# Patient Record
Sex: FEMALE | Race: WHITE | NOT HISPANIC OR LATINO | Employment: OTHER | URBAN - METROPOLITAN AREA
[De-identification: names, ages, dates, MRNs, and addresses within clinical notes are randomized per-mention and may not be internally consistent; named-entity substitution may affect disease eponyms.]

---

## 2017-03-26 ENCOUNTER — OFFICE VISIT (OUTPATIENT)
Dept: URGENT CARE | Facility: URGENT CARE | Age: 27
End: 2017-03-26
Payer: COMMERCIAL

## 2017-03-26 VITALS
HEIGHT: 66 IN | TEMPERATURE: 98 F | BODY MASS INDEX: 21.69 KG/M2 | HEART RATE: 75 BPM | DIASTOLIC BLOOD PRESSURE: 70 MMHG | RESPIRATION RATE: 14 BRPM | WEIGHT: 135 LBS | SYSTOLIC BLOOD PRESSURE: 100 MMHG | OXYGEN SATURATION: 97 %

## 2017-03-26 DIAGNOSIS — J20.9 ACUTE BRONCHITIS WITH SYMPTOMS > 10 DAYS: Primary | ICD-10-CM

## 2017-03-26 DIAGNOSIS — R07.0 THROAT PAIN: ICD-10-CM

## 2017-03-26 LAB
DEPRECATED S PYO AG THROAT QL EIA: NORMAL
MICRO REPORT STATUS: NORMAL
SPECIMEN SOURCE: NORMAL

## 2017-03-26 PROCEDURE — 87081 CULTURE SCREEN ONLY: CPT | Performed by: FAMILY MEDICINE

## 2017-03-26 PROCEDURE — 87880 STREP A ASSAY W/OPTIC: CPT | Performed by: FAMILY MEDICINE

## 2017-03-26 PROCEDURE — 99213 OFFICE O/P EST LOW 20 MIN: CPT | Performed by: FAMILY MEDICINE

## 2017-03-26 RX ORDER — ALBUTEROL SULFATE 90 UG/1
2 AEROSOL, METERED RESPIRATORY (INHALATION) EVERY 6 HOURS PRN
Qty: 1 INHALER | Refills: 0 | Status: SHIPPED | OUTPATIENT
Start: 2017-03-26 | End: 2018-10-15

## 2017-03-26 RX ORDER — PREDNISONE 20 MG/1
20 TABLET ORAL 2 TIMES DAILY
Qty: 10 TABLET | Refills: 0 | Status: SHIPPED | OUTPATIENT
Start: 2017-03-26 | End: 2017-03-26

## 2017-03-26 RX ORDER — PREDNISONE 20 MG/1
40 TABLET ORAL DAILY
Qty: 10 TABLET | Refills: 0 | Status: SHIPPED | OUTPATIENT
Start: 2017-03-26 | End: 2018-10-15

## 2017-03-26 RX ORDER — AZITHROMYCIN 250 MG/1
TABLET, FILM COATED ORAL
Qty: 6 TABLET | Refills: 0 | Status: SHIPPED | OUTPATIENT
Start: 2017-03-26 | End: 2018-10-15

## 2017-03-26 NOTE — PATIENT INSTRUCTIONS
"Additional history/life update:       Acute bronchitis with symptoms > 10 days  Throat pain :uri then chest cold for almost two weeks now.  Hard to cough up sputum seems \"stuck in chest. \" no fevers.  No dyspnea.  Throat pain last 2-3 days.  Can swallow ok.     Medical, social, surgical, and family histories reviewed.      REVIEW OF SYSTEMS FOR GENERAL, RESPIRATORY, CV, GI AND PSYCHIATRIC NEGATIVE EXCEPT AS NOTED IN HPI.         OBJECTIVE:  /70  Pulse 75  Temp 98  F (36.7  C) (Oral)  Resp 14  Ht 5' 6\" (1.676 m)  Wt 135 lb (61.2 kg)  SpO2 97%  Breastfeeding? Yes  BMI 21.79 kg/m2    EXAM:  GENERAL APPEARANCE: healthy, alert and no distress  Tympanic membranes clear  RESP: lungs clear to auscultation - no rales, rhonchi or wheezes, some wheeze with forced expiration however.   Tonsils 1=, slightly red, no exudate.     CV: regular rates and rhythm, normal S1 S2, no S3 or S4 and no murmur, click or rub -    Rapid strep negative.     ASSESSMENT AND PLAN  1. Acute bronchitis with symptoms > 10 days  Recommend:    Albuterol 2 puffs four times daily until better.  If not improving in 1-2 days start prednisone.   -start prednisone in am as may keep awake at night.   -if not improving 2-3 days after starting prednisone or if high fevers then start azithromycin (both printed out. )         2. Throat pain    - Strep, Rapid Screen  - Beta strep group A culture        MYCHART SIGNUP FOR E-VISITS AND EASIER COMMUNICATION:  [email or web address]     Zipnosis:  [email or web address].  Sign up for e-visits for common illnesses!     RADIOLOGY:   [facility]:  [phone] to schedule any radiology tests at [facility]: [phone]    Mammograms/colonoscopies:  [phone]    CONSUMER PRICE LINE for estimates of test costs:  [phone]         Joel Wegener,MD          "

## 2017-03-26 NOTE — MR AVS SNAPSHOT
"              After Visit Summary   3/26/2017    Lindsey Giles    MRN: [id number]           Patient Information     Date Of Birth          1990        Visit Information        Provider Department      3/26/2017 4:15 PM Wegener, Joel Daniel Irwin, MD [facility]        Today's Diagnoses     Acute bronchitis with symptoms > 10 days    -  1    Throat pain          Care Instructions    Additional history/life update:       Acute bronchitis with symptoms > 10 days  Throat pain :uri then chest cold for almost two weeks now.  Hard to cough up sputum seems \"stuck in chest. \" no fevers.  No dyspnea.  Throat pain last 2-3 days.  Can swallow ok.     Medical, social, surgical, and family histories reviewed.      REVIEW OF SYSTEMS FOR GENERAL, RESPIRATORY, CV, GI AND PSYCHIATRIC NEGATIVE EXCEPT AS NOTED IN HPI.         OBJECTIVE:  /70  Pulse 75  Temp 98  F (36.7  C) (Oral)  Resp 14  Ht 5' 6\" (1.676 m)  Wt 135 lb (61.2 kg)  SpO2 97%  Breastfeeding? Yes  BMI 21.79 kg/m2    EXAM:  GENERAL APPEARANCE: healthy, alert and no distress  Tympanic membranes clear  RESP: lungs clear to auscultation - no rales, rhonchi or wheezes, some wheeze with forced expiration however.   Tonsils 1=, slightly red, no exudate.     CV: regular rates and rhythm, normal S1 S2, no S3 or S4 and no murmur, click or rub -    Rapid strep negative.     ASSESSMENT AND PLAN  1. Acute bronchitis with symptoms > 10 days  Recommend:    Albuterol 2 puffs four times daily until better.  If not improving in 1-2 days start prednisone.   -start prednisone in am as may keep awake at night.   -if not improving 2-3 days after starting prednisone or if high fevers then start azithromycin (both printed out. )         2. Throat pain    - Strep, Rapid Screen  - Beta strep group A culture        MYCHART SIGNUP FOR E-VISITS AND EASIER COMMUNICATION:  [email or web address]     Zipnosis:  [email or web address].  Sign up for e-visits " "for common illnesses!     RADIOLOGY:   [facility]:  [phone] to schedule any radiology tests at [facility]: [phone]    Mammograms/colonoscopies:  [phone]    CONSUMER PRICE LINE for estimates of test costs:  [phone]         Joel Wegener,MD                Follow-ups after your visit        Who to contact     If you have questions or need follow up information about today's clinic visit or your schedule please contact [facility] directly at [phone].  Normal or non-critical lab and imaging results will be communicated to you by [employer], letter or phone within 4 business days after the clinic has received the results. If you do not hear from us within 7 days, please contact the clinic through [employer] or phone. If you have a critical or abnormal lab result, we will notify you by phone as soon as possible.  Submit refill requests through [employer] or call your pharmacy and they will forward the refill request to us. Please allow 3 business days for your refill to be completed.          Additional Information About Your Visit        MyChart Information     [employer] lets you send messages to your doctor, view your test results, renew your prescriptions, schedule appointments and more. To sign up, go to [email or web address] . Click on \"Log in\" on the left side of the screen, which will take you to the Welcome page. Then click on \"Sign up Now\" on the right side of the page.     You will be asked to enter the access code listed below, as well as some personal information. Please follow the directions to create your username and password.     Your access code is: [email or web address]  Expires: 2017  5:23 PM     Your access code will  in 90 days. If you need help or a new code, please call your [place] clinic or [phone].        Care EveryWhere ID     This is your Care EveryWhere ID. This could be used by other organizations to access your [place] " "medical records  [id number]        Your Vitals Were     Pulse Temperature Respirations Height Pulse Oximetry Breastfeeding?    75 98  F (36.7  C) (Oral) 14 5' 6\" (1.676 m) 97% Yes    BMI (Body Mass Index)                   21.79 kg/m2            Blood Pressure from Last 3 Encounters:   03/26/17 100/70    Weight from Last 3 Encounters:   03/26/17 135 lb (61.2 kg)              We Performed the Following     Beta strep group A culture     Strep, Rapid Screen          Today's Medication Changes          These changes are accurate as of: 3/26/17  5:23 PM.  If you have any questions, ask your nurse or doctor.               Start taking these medicines.        Dose/Directions    albuterol 108 (90 BASE) MCG/ACT Inhaler   Commonly known as:  PROAIR HFA/PROVENTIL HFA/VENTOLIN HFA   Used for:  Acute bronchitis with symptoms > 10 days   Started by:  Wegener, Joel Daniel Irwin, MD        Dose:  2 puff   Inhale 2 puffs into the lungs every 6 hours as needed for shortness of breath / dyspnea or wheezing   Quantity:  1 Inhaler   Refills:  0       azithromycin 250 MG tablet   Commonly known as:  ZITHROMAX   Used for:  Acute bronchitis with symptoms > 10 days   Started by:  Wegener, Joel Daniel Irwin, MD        Two tablets first day, then one tablet daily for four days.   Quantity:  6 tablet   Refills:  0       predniSONE 20 MG tablet   Commonly known as:  DELTASONE   Used for:  Acute bronchitis with symptoms > 10 days   Started by:  Wegener, Joel Daniel Irwin, MD        Dose:  20 mg   Take 1 tablet (20 mg) by mouth 2 times daily   Quantity:  10 tablet   Refills:  0            Where to get your medicines      These medications were sent to [id number] - [place], [place] - [address] AT [facility]  [address], [place] [address]     Phone:  [phone]     albuterol 108 (90 BASE) MCG/ACT Inhaler         Some of these will need a paper prescription and others can be bought over the counter.  Ask " your nurse if you have questions.     Bring a paper prescription for each of these medications     azithromycin 250 MG tablet    predniSONE 20 MG tablet                Primary Care Provider    None Specified       No primary provider on file.        Thank you!     Thank you for choosing [facility]  for your care. Our goal is always to provide you with excellent care. Hearing back from our patients is one way we can continue to improve our services. Please take a few minutes to complete the written survey that you may receive in the mail after your visit with us. Thank you!             Your Updated Medication List - Protect others around you: Learn how to safely use, store and throw away your medicines at www.disposemymeds.org.          This list is accurate as of: 3/26/17  5:23 PM.  Always use your most recent med list.                   Brand Name Dispense Instructions for use    albuterol 108 (90 BASE) MCG/ACT Inhaler    PROAIR HFA/PROVENTIL HFA/VENTOLIN HFA    1 Inhaler    Inhale 2 puffs into the lungs every 6 hours as needed for shortness of breath / dyspnea or wheezing       azithromycin 250 MG tablet    ZITHROMAX    6 tablet    Two tablets first day, then one tablet daily for four days.       predniSONE 20 MG tablet    DELTASONE    10 tablet    Take 1 tablet (20 mg) by mouth 2 times daily

## 2017-03-26 NOTE — PROGRESS NOTES
"Additional history/life update:       Acute bronchitis with symptoms > 10 days  Throat pain :uri then chest cold for almost two weeks now.  Hard to cough up sputum seems \"stuck in chest. \" no fevers.  No dyspnea.  Throat pain last 2-3 days.  Can swallow ok.     Medical, social, surgical, and family histories reviewed.      REVIEW OF SYSTEMS FOR GENERAL, RESPIRATORY, CV, GI AND PSYCHIATRIC NEGATIVE EXCEPT AS NOTED IN HPI.         OBJECTIVE:  /70  Pulse 75  Temp 98  F (36.7  C) (Oral)  Resp 14  Ht 5' 6\" (1.676 m)  Wt 135 lb (61.2 kg)  SpO2 97%  Breastfeeding? Yes  BMI 21.79 kg/m2    EXAM:  GENERAL APPEARANCE: healthy, alert and no distress  Tympanic membranes clear  RESP: lungs clear to auscultation - no rales, rhonchi or wheezes, some wheeze with forced expiration however.   Tonsils 1=, slightly red, no exudate.     CV: regular rates and rhythm, normal S1 S2, no S3 or S4 and no murmur, click or rub -    Rapid strep negative.     ASSESSMENT AND PLAN  1. Acute bronchitis with symptoms > 10 days  Recommend:    Albuterol 2 puffs four times daily until better.  If not improving in 1-2 days start prednisone.   -start prednisone in am as may keep awake at night.   -if not improving 2-3 days after starting prednisone or if high fevers then start azithromycin (both printed out. )         2. Throat pain    - Strep, Rapid Screen  - Beta strep group A culture        MYCHART SIGNUP FOR E-VISITS AND EASIER COMMUNICATION:  [email or web address]     Zipnosis:  [email or web address].  Sign up for e-visits for common illnesses!     RADIOLOGY:   [facility]:  [phone] to schedule any radiology tests at [facility]: [phone]    Mammograms/colonoscopies:  [phone]    CONSUMER PRICE LINE for estimates of test costs:  [phone]         Joel Wegener,MD        "

## 2017-03-26 NOTE — NURSING NOTE
"Chief Complaint   Patient presents with     Urgent Care     Pharyngitis     sore throat since mid week, chills. 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        Initial /70  Pulse 75  Temp 98  F (36.7  C) (Oral)  Resp 14  Ht 5' 6\" (1.676 m)  Wt 135 lb (61.2 kg)  SpO2 97%  Breastfeeding? Yes  BMI 21.79 kg/m2 Estimated body mass index is 21.79 kg/(m^2) as calculated from the following:    Height as of this encounter: 5' 6\" (1.676 m).    Weight as of this encounter: 135 lb (61.2 kg).  Medication Reconciliation: complete  "

## 2017-03-28 LAB
BACTERIA SPEC CULT: NORMAL
MICRO REPORT STATUS: NORMAL
SPECIMEN SOURCE: NORMAL

## 2017-12-10 ENCOUNTER — OFFICE VISIT (OUTPATIENT)
Dept: URGENT CARE | Facility: URGENT CARE | Age: 27
End: 2017-12-10
Payer: COMMERCIAL

## 2017-12-10 VITALS
RESPIRATION RATE: 14 BRPM | HEIGHT: 66 IN | HEART RATE: 96 BPM | WEIGHT: 130 LBS | DIASTOLIC BLOOD PRESSURE: 68 MMHG | OXYGEN SATURATION: 97 % | TEMPERATURE: 98.3 F | SYSTOLIC BLOOD PRESSURE: 100 MMHG | BODY MASS INDEX: 20.89 KG/M2

## 2017-12-10 DIAGNOSIS — J03.90 TONSILLITIS: ICD-10-CM

## 2017-12-10 DIAGNOSIS — R07.0 THROAT PAIN: Primary | ICD-10-CM

## 2017-12-10 LAB
DEPRECATED S PYO AG THROAT QL EIA: NORMAL
SPECIMEN SOURCE: NORMAL

## 2017-12-10 PROCEDURE — 99213 OFFICE O/P EST LOW 20 MIN: CPT | Performed by: FAMILY MEDICINE

## 2017-12-10 PROCEDURE — 87880 STREP A ASSAY W/OPTIC: CPT | Performed by: FAMILY MEDICINE

## 2017-12-10 PROCEDURE — 87081 CULTURE SCREEN ONLY: CPT | Performed by: FAMILY MEDICINE

## 2017-12-10 RX ORDER — AMOXICILLIN 875 MG
875 TABLET ORAL 2 TIMES DAILY
Qty: 20 TABLET | Refills: 0 | Status: SHIPPED | OUTPATIENT
Start: 2017-12-10 | End: 2018-10-15

## 2017-12-10 NOTE — PATIENT INSTRUCTIONS
When You Have a Sore Throat    A sore throat can be painful. There are many reasons why you may have a sore throat. Your healthcare provider will work with you to find the cause of your sore throat. He or she will also find the best treatment for you.  What causes a sore throat?  Sore throats can be caused or worsened by:    Cold or flu viruses    Bacteria    Irritants such as tobacco smoke or air pollution    Acid reflux  A healthy throat  The tonsils are on the sides of the throat near the base of the tongue. They collect viruses and bacteria and help fight infection. The throat (pharynx) is the passage for air. Mucus from the nasal cavity also moves down the passage.  An inflamed throat  The tonsils and pharynx can become inflamed due to a cold or flu virus. Postnasal drip (excess mucus draining from the nasal cavity) can irritate the throat. It can also make the throat or tonsils more likely to be infected by bacteria. Severe, untreated tonsillitis in children or adults can cause a pocket of pus (abscess) to form near the tonsil.  Your evaluation  A medical evaluation can help find the cause of your sore throat. It can also help your healthcare provider choose the best treatment for you. The evaluation may include a health history, physical exam, and diagnostic tests.  Health history  Your healthcare provider may ask you the following:    How long has the sore throat lasted and how have you been treating it?    Do you have any other symptoms, such as body aches, fever, or cough?    Does your sore throat recur? If so, how often? How many days of school or work have you missed because of a sore throat?    Do you have trouble eating or swallowing?    Have you been told that you snore or have other sleep problems?    Do you have bad breath?    Do you cough up bad-tasting mucus?  Physical exam  During the exam, your healthcare provider checks your ears, nose, and throat for problems. He or she also checks for  "swelling in the neck, and may listen to your chest.  Possible tests  Other tests your healthcare provider may perform include:    A throat swab to check for bacteria such as streptococcus (the bacteria that causes strep throat)    A blood test to check for mononucleosis (a viral infection)    A chest X-ray to rule out pneumonia, especially if you have a cough  Treating a sore throat  Treatment depends on many factors. What is the likely cause? Is the problem recent? Does it keep coming back? In many cases, the best thing to do is to treat the symptoms, rest, and let the problem heal itself. Antibiotics may help clear up some bacterial infections. For cases of severe or recurring tonsillitis, the tonsils may need to be removed.  Relieving your symptoms    Don t smoke, and avoid secondhand smoke.    For children, try throat sprays or Popsicles. Adults and older children may try lozenges.    Drink warm liquids to soothe the throat and help thin mucus. Avoid alcohol, spicy foods, and acidic drinks such as orange juice. These can irritate the throat.    Gargle with warm saltwater (1 teaspoon of salt to 8 ounces of warm water).    Use a humidifier to keep air moist and relieve throat dryness.    Try over-the-counter pain relievers such as acetaminophen or ibuprofen. Use as directed, and don t exceed the recommended dose. Don t give aspirin to children.   Are antibiotics needed?  If your sore throat is due to a bacterial infection, antibiotics may speed healing and prevent complications. Although group A streptococcus (\"strep throat\" or GAS) is the major treatable infection for a sore throat, GAS causes only 5% to 15% of sore throats in adults who seek medical care. Most sore throats are caused by cold or flu viruses. And antibiotics don t treat viral illness. In fact, using antibiotics when they re not needed may produce bacteria that are harder to kill. Your healthcare provider will prescribe antibiotics only if he or " she thinks they are likely to help.  If antibiotics are prescribed  Take the medicine exactly as directed. Be sure to finish your prescription even if you re feeling better. And be sure to ask your healthcare provider or pharmacist what side effects are common and what to do about them.  Is surgery needed?  In some cases, tonsils need to be removed. This is often done as outpatient (same-day) surgery. Your healthcare provider may advise removing the tonsils in cases of:    Several severe bouts of tonsillitis in a year.  Severe  episodes include those that lead to missed days of school or work, or that need to be treated with antibiotics.    Tonsillitis that causes breathing problems during sleep    Tonsillitis caused by food particles collecting in pouches in the tonsils (cryptic tonsillitis)  Call your healthcare provider if any of the following occur:    Symptoms worsen, or new symptoms develop.    Swollen tonsils make breathing difficult.    The pain is severe enough to keep you from drinking liquids.    A skin rash, hives, or wheezing develops. Any of these could signal an allergic reaction to antibiotics.    Symptoms don t improve within a week.    Symptoms don t improve within 2 to 3 days of starting antibiotics.   Date Last Reviewed: 10/1/2016    2735-2393 [employer]. [address] [place] [address], [place] [address]. All rights reserved. This information is not intended as a substitute for professional medical care. Always follow your healthcare professional's instructions.

## 2017-12-10 NOTE — NURSING NOTE
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        Initial /68  Pulse 96  Temp 98.3  F (36.8  C) (Oral)  Resp 14  Ht 5' 6\" (1.676 m)  Wt 130 lb (59 kg)  SpO2 97%  Breastfeeding? Yes  BMI 20.98 kg/m2 Estimated body mass index is 20.98 kg/(m^2) as calculated from the following:    Height as of this encounter: 5' 6\" (1.676 m).    Weight as of this encounter: 130 lb (59 kg).  Medication Reconciliation: complete  "

## 2017-12-10 NOTE — PROGRESS NOTES
Patient called-  She decided she wants amoxicillin, not augmentin-   Sent new Rx    Olivia Carcamo MD

## 2017-12-10 NOTE — PROGRESS NOTES
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      Lindsey Giles is a 27 year old female   with a chief complaint of sore throat.  Onset of symptoms was 1 day(s) ago.    Course of illness: sudden onset, still present and worsening.  Severity moderate  Current and Associated symptoms: chills, sore throat and headache  Treatment measures tried include Tylenol/Ibuprofen.  Predisposing factors include None.  Has   history of recurrent tonsillitis about 1 x per year    No past medical history on file.    ALLERGIES:  Review of patient's allergies indicates no known allergies.      Current Outpatient Prescriptions on File Prior to Visit:  albuterol (PROAIR HFA/PROVENTIL HFA/VENTOLIN HFA) 108 (90 BASE) MCG/ACT Inhaler Inhale 2 puffs into the lungs every 6 hours as needed for shortness of breath / dyspnea or wheezing   azithromycin (ZITHROMAX) 250 MG tablet Two tablets first day, then one tablet daily for four days.   predniSONE (DELTASONE) 20 MG tablet Take 2 tablets (40 mg) by mouth daily     No current facility-administered medications on file prior to visit.     Social History   Substance Use Topics     Smoking status: Never Smoker     Smokeless tobacco: Not on file     Alcohol use Not on file       No family history on file.      ROS:  INTEGUMENTARY/SKIN: NEGATIVE for worrisome rashes, moles or lesions  EYES: NEGATIVE for vision changes or irritation  RESP:NEGATIVE for significant cough or SOB  GI: NEGATIVE for nausea, abdominal pain, heartburn, or change in bowel habits    OBJECTIVE:   /68  Pulse 96  Temp 98.3  F (36.8  C) (Oral)  Resp 14  Ht 5' 6\" (1.676 m)  Wt 130 lb (59 kg)  SpO2 97%  Breastfeeding? Yes  BMI 20.98 kg/m2  GENERAL APPEARANCE: alert, moderate distress and cooperative  HENT: tonsillar hypertrophy, tonsillar erythema and tonsillar exudate  ,HENT: ear canals and TM's normal.  Nose " normal.  Pharynx erythematous with some exudate noted.,NECK: supple, non-tender to palpation, no adenopathy noted   ,EYES: EOMI,  PERRL, conjunctiva clear  ,RESP: lungs clear to auscultation - no rales, rhonchi or wheezes  ,CV: regular rates and rhythm, normal S1 S2, no murmer noted, ,SKIN: no suspicious lesions or rashes    Rapid Strep test is negative     ASSESSMENT:  Throat pain     - Strep, Rapid Screen  - Beta strep group A culture    Tonsillitis     - amoxicillin-clavulanate (AUGMENTIN) 875-125 MG per tablet; Take 1 tablet by mouth 2 times daily         I discussed with the patient that a confirmatory strep culture will be performed and that she will be called if the culture is positive for strep.    We discussed   possible causes of tonsillitis besides strep throat including bacterial tonsillitis,  cold viruses and mononucleosis.   The limitations of the mono test was discussed and that late and/or repeated testing is sometimes necessary when mononucleosis is the cause of the illness.  Tonsillitis caused by mono or cold viruses will not respond to antibiotics    We discussed that there is no test for bacterial tonsillitis and that the diagnosis is confirmed if the inflamed tonsils respond to antibiotic treatment.  Bacterial tonsillitis tends to develop in people with tonsils that have a surface that lets food particles get trapped in the tonsil or when there are other infections in the mouth that spread to the tonsil.       Symptomatic treatment with gargles, lozenges, and OTC analgesic (acetaminophen/ ibuprofen)   as needed. Follow-up with primary clinic if not improving.

## 2017-12-10 NOTE — MR AVS SNAPSHOT
After Visit Summary   12/10/2017    Lindsey Giles    MRN: [id number]           Patient Information     Date Of Birth          1990        Visit Information        Provider Department      12/10/2017 12:30 PM Olivia Carcamo MD [facility]        Today's Diagnoses     Throat pain    -  1    Tonsillitis          Care Instructions      When You Have a Sore Throat    A sore throat can be painful. There are many reasons why you may have a sore throat. Your healthcare provider will work with you to find the cause of your sore throat. He or she will also find the best treatment for you.  What causes a sore throat?  Sore throats can be caused or worsened by:    Cold or flu viruses    Bacteria    Irritants such as tobacco smoke or air pollution    Acid reflux  A healthy throat  The tonsils are on the sides of the throat near the base of the tongue. They collect viruses and bacteria and help fight infection. The throat (pharynx) is the passage for air. Mucus from the nasal cavity also moves down the passage.  An inflamed throat  The tonsils and pharynx can become inflamed due to a cold or flu virus. Postnasal drip (excess mucus draining from the nasal cavity) can irritate the throat. It can also make the throat or tonsils more likely to be infected by bacteria. Severe, untreated tonsillitis in children or adults can cause a pocket of pus (abscess) to form near the tonsil.  Your evaluation  A medical evaluation can help find the cause of your sore throat. It can also help your healthcare provider choose the best treatment for you. The evaluation may include a health history, physical exam, and diagnostic tests.  Health history  Your healthcare provider may ask you the following:    How long has the sore throat lasted and how have you been treating it?    Do you have any other symptoms, such as body aches, fever, or cough?    Does your sore throat recur? If so, how often? How many  "days of school or work have you missed because of a sore throat?    Do you have trouble eating or swallowing?    Have you been told that you snore or have other sleep problems?    Do you have bad breath?    Do you cough up bad-tasting mucus?  Physical exam  During the exam, your healthcare provider checks your ears, nose, and throat for problems. He or she also checks for swelling in the neck, and may listen to your chest.  Possible tests  Other tests your healthcare provider may perform include:    A throat swab to check for bacteria such as streptococcus (the bacteria that causes strep throat)    A blood test to check for mononucleosis (a viral infection)    A chest X-ray to rule out pneumonia, especially if you have a cough  Treating a sore throat  Treatment depends on many factors. What is the likely cause? Is the problem recent? Does it keep coming back? In many cases, the best thing to do is to treat the symptoms, rest, and let the problem heal itself. Antibiotics may help clear up some bacterial infections. For cases of severe or recurring tonsillitis, the tonsils may need to be removed.  Relieving your symptoms    Don t smoke, and avoid secondhand smoke.    For children, try throat sprays or Popsicles. Adults and older children may try lozenges.    Drink warm liquids to soothe the throat and help thin mucus. Avoid alcohol, spicy foods, and acidic drinks such as orange juice. These can irritate the throat.    Gargle with warm saltwater (1 teaspoon of salt to 8 ounces of warm water).    Use a humidifier to keep air moist and relieve throat dryness.    Try over-the-counter pain relievers such as acetaminophen or ibuprofen. Use as directed, and don t exceed the recommended dose. Don t give aspirin to children.   Are antibiotics needed?  If your sore throat is due to a bacterial infection, antibiotics may speed healing and prevent complications. Although group A streptococcus (\"strep throat\" or GAS) is the major " treatable infection for a sore throat, GAS causes only 5% to 15% of sore throats in adults who seek medical care. Most sore throats are caused by cold or flu viruses. And antibiotics don t treat viral illness. In fact, using antibiotics when they re not needed may produce bacteria that are harder to kill. Your healthcare provider will prescribe antibiotics only if he or she thinks they are likely to help.  If antibiotics are prescribed  Take the medicine exactly as directed. Be sure to finish your prescription even if you re feeling better. And be sure to ask your healthcare provider or pharmacist what side effects are common and what to do about them.  Is surgery needed?  In some cases, tonsils need to be removed. This is often done as outpatient (same-day) surgery. Your healthcare provider may advise removing the tonsils in cases of:    Several severe bouts of tonsillitis in a year.  Severe  episodes include those that lead to missed days of school or work, or that need to be treated with antibiotics.    Tonsillitis that causes breathing problems during sleep    Tonsillitis caused by food particles collecting in pouches in the tonsils (cryptic tonsillitis)  Call your healthcare provider if any of the following occur:    Symptoms worsen, or new symptoms develop.    Swollen tonsils make breathing difficult.    The pain is severe enough to keep you from drinking liquids.    A skin rash, hives, or wheezing develops. Any of these could signal an allergic reaction to antibiotics.    Symptoms don t improve within a week.    Symptoms don t improve within 2 to 3 days of starting antibiotics.   Date Last Reviewed: 10/1/2016    0548-2531 [employer]. [address] [place] [address], [place] [address]. All rights reserved. This information is not intended as a substitute for professional medical care. Always follow your healthcare professional's instructions.                Follow-ups after your visit        Who to  "contact     If you have questions or need follow up information about today's clinic visit or your schedule please contact [facility] directly at [phone].  Normal or non-critical lab and imaging results will be communicated to you by MyChart, letter or phone within 4 business days after the clinic has received the results. If you do not hear from us within 7 days, please contact the clinic through [employer] or phone. If you have a critical or abnormal lab result, we will notify you by phone as soon as possible.  Submit refill requests through [employer] or call your pharmacy and they will forward the refill request to us. Please allow 3 business days for your refill to be completed.          Additional Information About Your Visit        [employer] Information     [employer] lets you send messages to your doctor, view your test results, renew your prescriptions, schedule appointments and more. To sign up, go to [email or web address] . Click on \"Log in\" on the left side of the screen, which will take you to the Welcome page. Then click on \"Sign up Now\" on the right side of the page.     You will be asked to enter the access code listed below, as well as some personal information. Please follow the directions to create your username and password.     Your access code is: [email or web address]  Expires: 3/10/2018  2:04 PM     Your access code will  in 90 days. If you need help or a new code, please call your [place] clinic or [phone].        Care EveryWhere ID     This is your Care EveryWhere ID. This could be used by other organizations to access your [place] medical records  [id number]        Your Vitals Were     Pulse Temperature Respirations Height Pulse Oximetry Breastfeeding?    96 98.3  F (36.8  C) (Oral) 14 5' 6\" (1.676 m) 97% Yes    BMI (Body Mass Index)                   20.98 kg/m2            Blood Pressure from Last 3 Encounters:   12/10/17 100/68   17 100/70    Weight from " Last 3 Encounters:   12/10/17 130 lb (59 kg)   03/26/17 135 lb (61.2 kg)              We Performed the Following     Beta strep group A culture     Strep, Rapid Screen          Today's Medication Changes          These changes are accurate as of: 12/10/17  2:04 PM.  If you have any questions, ask your nurse or doctor.               Start taking these medicines.        Dose/Directions    amoxicillin-clavulanate 875-125 MG per tablet   Commonly known as:  AUGMENTIN   Used for:  Tonsillitis   Started by:  Olivia Carcamo MD        Dose:  1 tablet   Take 1 tablet by mouth 2 times daily   Quantity:  20 tablet   Refills:  0            Where to get your medicines      These medications were sent to [facility] [address] - [place], [place] - [address] [facility]  [address], [place] [address]     Phone:  [phone]     amoxicillin-clavulanate 875-125 MG per tablet                Primary Care Provider Office Phone # Fax #    Luli Llanes [phone]       [address] [address]        Equal Access to Services     [facility]: Hadii aad ku hadasho Soomaali, waaxda luqadaha, qaybta kaalmada adeegyada, waxay madhavi hernandez . So [facility] [phone].    ATENCIÓN: Si habla español, tiene a hicks disposición servicios gratuitos de asistencia lingüística. [facility] [phone].    We comply with applicable federal civil rights laws and [place] laws. We do not discriminate on the basis of race, color, national origin, age, disability, sex, sexual orientation, or gender identity.            Thank you!     Thank you for choosing [facility]  for your care. Our goal is always to provide you with excellent care. Hearing back from our patients is one way we can continue to improve our services. Please take a few minutes to complete the written survey that you may receive in the mail after your visit with us. Thank you!              Your Updated Medication List - Protect others around you: Learn how to safely use, store and throw away your medicines at www.disposemymeds.org.          This list is accurate as of: 12/10/17  2:04 PM.  Always use your most recent med list.                   Brand Name Dispense Instructions for use Diagnosis    albuterol 108 (90 BASE) MCG/ACT Inhaler    PROAIR HFA/PROVENTIL HFA/VENTOLIN HFA    1 Inhaler    Inhale 2 puffs into the lungs every 6 hours as needed for shortness of breath / dyspnea or wheezing    Acute bronchitis with symptoms > 10 days       amoxicillin-clavulanate 875-125 MG per tablet    AUGMENTIN    20 tablet    Take 1 tablet by mouth 2 times daily    Tonsillitis       azithromycin 250 MG tablet    ZITHROMAX    6 tablet    Two tablets first day, then one tablet daily for four days.    Acute bronchitis with symptoms > 10 days       predniSONE 20 MG tablet    DELTASONE    10 tablet    Take 2 tablets (40 mg) by mouth daily    Acute bronchitis with symptoms > 10 days

## 2017-12-11 LAB
BACTERIA SPEC CULT: NORMAL
SPECIMEN SOURCE: NORMAL

## 2018-10-15 ENCOUNTER — RADIANT APPOINTMENT (OUTPATIENT)
Dept: ULTRASOUND IMAGING | Facility: CLINIC | Age: 28
End: 2018-10-15
Attending: ADVANCED PRACTICE MIDWIFE
Payer: COMMERCIAL

## 2018-10-15 ENCOUNTER — OFFICE VISIT (OUTPATIENT)
Dept: OBGYN | Facility: CLINIC | Age: 28
End: 2018-10-15
Attending: ADVANCED PRACTICE MIDWIFE
Payer: COMMERCIAL

## 2018-10-15 VITALS
WEIGHT: 130.4 LBS | SYSTOLIC BLOOD PRESSURE: 114 MMHG | DIASTOLIC BLOOD PRESSURE: 71 MMHG | BODY MASS INDEX: 20.96 KG/M2 | HEIGHT: 66 IN | HEART RATE: 73 BPM

## 2018-10-15 DIAGNOSIS — Z87.59 HISTORY OF GESTATIONAL HYPERTENSION: ICD-10-CM

## 2018-10-15 DIAGNOSIS — O09.91 SUPERVISION OF HIGH RISK PREGNANCY IN FIRST TRIMESTER: ICD-10-CM

## 2018-10-15 DIAGNOSIS — Z36.89 ENCOUNTER FOR FETAL ANATOMIC SURVEY: ICD-10-CM

## 2018-10-15 DIAGNOSIS — Z34.80 PRENATAL CARE, SUBSEQUENT PREGNANCY, UNSPECIFIED TRIMESTER: Primary | ICD-10-CM

## 2018-10-15 DIAGNOSIS — Z86.59 HISTORY OF DEPRESSION: ICD-10-CM

## 2018-10-15 LAB
ABO + RH BLD: NORMAL
ABO + RH BLD: NORMAL
ALT SERPL W P-5'-P-CCNC: 18 U/L (ref 0–50)
ANION GAP SERPL CALCULATED.3IONS-SCNC: 5 MMOL/L (ref 3–14)
AST SERPL W P-5'-P-CCNC: 10 U/L (ref 0–45)
BLD GP AB SCN SERPL QL: NORMAL
BLOOD BANK CMNT PATIENT-IMP: NORMAL
BUN SERPL-MCNC: 8 MG/DL (ref 7–30)
CALCIUM SERPL-MCNC: 8.8 MG/DL (ref 8.5–10.1)
CHLORIDE SERPL-SCNC: 108 MMOL/L (ref 94–109)
CO2 SERPL-SCNC: 25 MMOL/L (ref 20–32)
CREAT SERPL-MCNC: 0.43 MG/DL (ref 0.52–1.04)
CREAT UR-MCNC: 31 MG/DL
DEPRECATED CALCIDIOL+CALCIFEROL SERPL-MC: 26 UG/L (ref 20–75)
ERYTHROCYTE [DISTWIDTH] IN BLOOD BY AUTOMATED COUNT: 11.9 % (ref 10–15)
GFR SERPL CREATININE-BSD FRML MDRD: >90 ML/MIN/1.7M2
GLUCOSE SERPL-MCNC: 94 MG/DL (ref 70–99)
HBV SURFACE AB SERPL IA-ACNC: 85.79 M[IU]/ML
HBV SURFACE AG SERPL QL IA: NONREACTIVE
HCT VFR BLD AUTO: 38.4 % (ref 35–47)
HGB BLD-MCNC: 12.6 G/DL (ref 11.7–15.7)
HIV 1+2 AB+HIV1 P24 AG SERPL QL IA: NONREACTIVE
MCH RBC QN AUTO: 29.4 PG (ref 26.5–33)
MCHC RBC AUTO-ENTMCNC: 32.8 G/DL (ref 31.5–36.5)
MCV RBC AUTO: 90 FL (ref 78–100)
PLATELET # BLD AUTO: 176 10E9/L (ref 150–450)
POTASSIUM SERPL-SCNC: 4.1 MMOL/L (ref 3.4–5.3)
PROT UR-MCNC: 0.09 G/L
PROT/CREAT 24H UR: 0.29 G/G CR (ref 0–0.2)
RBC # BLD AUTO: 4.29 10E12/L (ref 3.8–5.2)
SODIUM SERPL-SCNC: 138 MMOL/L (ref 133–144)
SPECIMEN EXP DATE BLD: NORMAL
URATE SERPL-MCNC: 3.2 MG/DL (ref 2.6–6)
WBC # BLD AUTO: 8.5 10E9/L (ref 4–11)

## 2018-10-15 PROCEDURE — 84460 ALANINE AMINO (ALT) (SGPT): CPT | Performed by: ADVANCED PRACTICE MIDWIFE

## 2018-10-15 PROCEDURE — 85027 COMPLETE CBC AUTOMATED: CPT | Performed by: ADVANCED PRACTICE MIDWIFE

## 2018-10-15 PROCEDURE — 86901 BLOOD TYPING SEROLOGIC RH(D): CPT | Performed by: ADVANCED PRACTICE MIDWIFE

## 2018-10-15 PROCEDURE — 84450 TRANSFERASE (AST) (SGOT): CPT | Performed by: ADVANCED PRACTICE MIDWIFE

## 2018-10-15 PROCEDURE — 84550 ASSAY OF BLOOD/URIC ACID: CPT | Performed by: ADVANCED PRACTICE MIDWIFE

## 2018-10-15 PROCEDURE — 84156 ASSAY OF PROTEIN URINE: CPT | Performed by: ADVANCED PRACTICE MIDWIFE

## 2018-10-15 PROCEDURE — 82306 VITAMIN D 25 HYDROXY: CPT | Performed by: ADVANCED PRACTICE MIDWIFE

## 2018-10-15 PROCEDURE — 86850 RBC ANTIBODY SCREEN: CPT | Performed by: ADVANCED PRACTICE MIDWIFE

## 2018-10-15 PROCEDURE — 87086 URINE CULTURE/COLONY COUNT: CPT | Performed by: ADVANCED PRACTICE MIDWIFE

## 2018-10-15 PROCEDURE — 86706 HEP B SURFACE ANTIBODY: CPT | Performed by: ADVANCED PRACTICE MIDWIFE

## 2018-10-15 PROCEDURE — 87340 HEPATITIS B SURFACE AG IA: CPT | Performed by: ADVANCED PRACTICE MIDWIFE

## 2018-10-15 PROCEDURE — G0463 HOSPITAL OUTPT CLINIC VISIT: HCPCS

## 2018-10-15 PROCEDURE — 76801 OB US < 14 WKS SINGLE FETUS: CPT

## 2018-10-15 PROCEDURE — 86762 RUBELLA ANTIBODY: CPT | Performed by: ADVANCED PRACTICE MIDWIFE

## 2018-10-15 PROCEDURE — 86780 TREPONEMA PALLIDUM: CPT | Performed by: ADVANCED PRACTICE MIDWIFE

## 2018-10-15 PROCEDURE — 80048 BASIC METABOLIC PNL TOTAL CA: CPT | Performed by: ADVANCED PRACTICE MIDWIFE

## 2018-10-15 PROCEDURE — 36415 COLL VENOUS BLD VENIPUNCTURE: CPT | Performed by: ADVANCED PRACTICE MIDWIFE

## 2018-10-15 PROCEDURE — 87389 HIV-1 AG W/HIV-1&-2 AB AG IA: CPT | Performed by: ADVANCED PRACTICE MIDWIFE

## 2018-10-15 PROCEDURE — 86900 BLOOD TYPING SEROLOGIC ABO: CPT | Performed by: ADVANCED PRACTICE MIDWIFE

## 2018-10-15 ASSESSMENT — PAIN SCALES - GENERAL: PAINLEVEL: NO PAIN (0)

## 2018-10-15 NOTE — MR AVS SNAPSHOT
After Visit Summary   10/15/2018    Lindsey Giles    MRN: [id number]           Patient Information     Date Of Birth          1990        Visit Information        Provider Department      10/15/2018 10:15 AM Maura Gates APRN CNM [facility]        Today's Diagnoses     Prenatal care, subsequent pregnancy, unspecified trimester    -  1    History of gestational hypertension        Encounter for fetal anatomic survey        History of depression           Follow-ups after your visit        Additional Services     MAT FETAL MED CTR REFERRAL-PREGNANCY       Body mass index is 21.05 kg/(m^2).    >> Patient may proceed with recommendations for further testing as directed by the Maternal Fetal Medicine Specialist >>    >> If requesting Fetal Echo: MFM will determine appropriate location for exam due to indication.    >> If requesting Lung Maturity Amnio:  If results indicate fetal lung maturity, induction or C/S is recommended within 36 hours.  Please schedule accordingly.     Dear Patient:   Please be aware that coverage of these services is subject to the terms and limitations of your health insurance plan.  Call member services at your health plan with any benefit or coverage questions.      Please bring the following to your appointment:    >>  Any x-rays, CTs or MRIs which have been performed.  Contact the facility where they were done to arrange for  prior to your scheduled appointment.  Any new CT, MRI or other procedures ordered by your specialist must be performed at a [place] facility or coordinated by your clinic's referral office.  >>  List of current medications   >>  This referral request   >>  Any documents/labs given to you for this referral                  Follow-up notes from your care team     Discussed this visit Return in about 2 weeks (around 10/29/2018) for NOB.      Your next 10 appointments already scheduled     Oct 29, 2018  8:30 AM CDT  "  NEW OB with HÉCTOR Johnson CNM   [facility] ([facility])    [facility]  [address]  [address]  [facility] [id number] [phone]              Future tests that were ordered for you today     Open Future Orders        Priority Expected Expires Ordered    [facility] Comprehensive Single Routine  8/15/2019 10/15/2018            Who to contact     Please call your clinic at [phone] to:    Ask questions about your health    Make or cancel appointments    Discuss your medicines    Learn about your test results    Speak to your doctor            Additional Information About Your Visit        MyChart Information     [employer] is an electronic gateway that provides easy, online access to your medical records. With [employer], you can request a clinic appointment, read your test results, renew a prescription or communicate with your care team.     To sign up for [employer] visit the website at [email or web address]   You will be asked to enter the access code listed below, as well as some personal information. Please follow the directions to create your username and password.     Your access code is: [email or web address]  Expires: 2019  9:11 AM     Your access code will  in 90 days. If you need help or a new code, please contact your [facility] or call [phone] for assistance.        Care EveryWhere ID     This is your Care EveryWhere ID. This could be used by other organizations to access your [place] medical records  [id number]        Your Vitals Were     Pulse Height Last Period Breastfeeding? BMI (Body Mass Index)       73 1.676 m (5' 6\") 08/15/2018 Yes 21.05 kg/m2        Blood Pressure from Last 3 Encounters:   10/15/18 114/71   12/10/17 100/68   17 100/70    Weight from Last 3 Encounters:   10/15/18 59.1 kg (130 lb 6.4 oz)   12/10/17 59 kg (130 lb)   17 61.2 kg (135 lb)              We " Performed the Following     ABO/Rh type and screen     ALT     AST     Basic metabolic panel     CBC with platelets     Hepatitis B Surface Antibody     Hepatitis B surface antigen     HIV Antigen Antibody Combo     MAT FETAL MED CTR REFERRAL-PREGNANCY     Protein  random urine with Creat Ratio     Rubella Antibody IgG Quantitative     Treponema Abs w Reflex to RPR and Titer     Uric acid     Urine Culture Aerobic Bacterial     Vitamin D Deficiency        Primary Care Provider Office Phone # Fax #    Luli Llanes [phone]       [address] [address]        Equal Access to Services     CONNOR SCHAFER : Hadii aad ku hadasho Soomaali, waaxda luqadaha, qaybta kaalmada adeegyada, waxay idiin hayaan adesaloni hernandez . So [facility] [phone].    ATENCIÓN: Si habla español, tiene a hicks disposición servicios gratuitos de asistencia lingüística. [facility] [phone].    We comply with applicable federal civil rights laws and [place] laws. We do not discriminate on the basis of race, color, national origin, age, disability, sex, sexual orientation, or gender identity.            Thank you!     Thank you for choosing [facility]  for your care. Our goal is always to provide you with excellent care. Hearing back from our patients is one way we can continue to improve our services. Please take a few minutes to complete the written survey that you may receive in the mail after your visit with us. Thank you!             Your Updated Medication List - Protect others around you: Learn how to safely use, store and throw away your medicines at www.disposemymeds.org.          This list is accurate as of 10/15/18  1:44 PM.  Always use your most recent med list.                   Brand Name Dispense Instructions for use Diagnosis    cholecalciferol 2000 units Caps      Take 2,000 Units by mouth daily Take one capsule daily.    Prenatal care,  subsequent pregnancy, unspecified trimester       Prenatal Vitamins 0.8 MG Tabs     30 tablet     Prenatal care, subsequent pregnancy, unspecified trimester

## 2018-10-15 NOTE — NURSING NOTE
Chief Complaint   Patient presents with     Intake     OB intake      Patient states she has no questions at this time. Jelena Paul CMA on 10/15/2018 at 10:07 AM

## 2018-10-15 NOTE — PROGRESS NOTES
[facility] OB Intake note  Subjective   28 year old woman presents to clinic for initiation of OB care.  Patient's last menstrual period was 08/15/2018.   at 8w5d by Estimated Date of Delivery: May 22, 2019 based on LMP with US confirmation.  Reviewed dating ultrasound. Pregnancy is planned.      Symptoms since LMP include nausea and fatigue.  Patient has tried these relief measures: increased rest.      - Genetic/Infection questionnaire completed, risks include None  Have you traveled during the pregnancy?No  Have your sexual partner(s) travelled during the pregnancy?No    - Exercise a few time a week - long walks/runs   - Had GHTN in first pregnancy with IOL.  No severe range BPs or Magnesium Sulfate per pt.  Epidural, 2nd degree laceration, no issues with PP.   Breastfeeding went well.   - Pap NIL 10/2015 noted under Care Everywhere, no history of abnormal.       - Current Medications -   Current Outpatient Prescriptions   Medication Sig Dispense Refill     cholecalciferol 2000 units CAPS Take 2,000 Units by mouth daily Take one capsule daily.       Prenatal Multivit-Min-Fe-FA (PRENATAL VITAMINS) 0.8 MG TABS  30 tablet          - Co-morbids    Past Medical History:   Diagnosis Date     History of depression     Situational in high school - Lexapro. No SI or hospitalizations     History of gestational hypertension      - Risk for GDM -  NO Personal history of GDM, BMI>30, h/o prediabetes/glucose intolerance, first degree relative with GDM or DM     - The patient does not h/o Pre Eclampsia, Current multi fetal gestation, Pre Gestational Diabetes (Type 1 or Type 2), chronic hypertension, renal disease, Autoimmune disease (systematic lupus erythematosus, antiphospholipid syndrome) so WILL NOT start low dose aspirin (81mg) starting between 12 and 28 weeks to prevent preeclampsia.   +history of GHTN, agreeable to baseline labs.     - The patient  does not have a history of spontaneous  birth so  WILL NOT  consider progesterone starting at 16-20 weeks and/or serial transvaginal cervical length ultrasounds from 16-24 weeks.         PERSONAL/SOCIAL HISTORY  , lives with their family. No pets  Employment: Part time.  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 Denies past or present domestic violence, sexual and psychological abuse.  - Had situational depression in high school, treated with Lexapro.  No hospitalizations, no SI.   Did not have any flare postpartum after first delivery.     Objective  -VS: reviewed and within normal limits   -General appearance: no acute distress, patient is comfortable   NEUROLOGICAL/PSYCHIATRIC   - Orientated x3,   -Mood and affect: : normal     Assessment/Plan  Lindsey was seen today for intake.    Diagnoses and all orders for this visit:    Prenatal care, subsequent pregnancy, unspecified trimester  -     ABO/Rh type and screen  -     Hepatitis B surface antigen  -     CBC with platelets  -     HIV Antigen Antibody Combo  -     Rubella Antibody IgG Quantitative  -     Treponema Abs w Reflex to RPR and Titer  -     Cancel: Urine Culture Aerobic Bacterial  -     Hepatitis B Surface Antibody  -     Vitamin D Deficiency  -     AST  -     ALT  -     Basic metabolic panel  -     Uric acid  -     Cancel: Protein  random urine with Creat Ratio  -     Protein  random urine with Creat Ratio  -     Urine Culture Aerobic Bacterial  -     MAT FETAL MED CTR REFERRAL-PREGNANCY    History of gestational hypertension  -     CBC with platelets  -     AST  -     ALT  -     Basic metabolic panel  -     Uric acid  -     Cancel: Protein  random urine with Creat Ratio  -     Protein  random urine with Creat Ratio  -     MAT FETAL MED CTR REFERRAL-PREGNANCY    Encounter for fetal anatomic survey  -     MAT FETAL MED CTR REFERRAL-PREGNANCY    History of depression      - Oriented to Practice, types of care, and how to  reach a provider.  Pt undecided CNM vs MD.  Had Family practice MD last delivery. Leaning toward CNM - given CNM info sheet. Follow up at next visit.   - Patient received 1st trimester new OB education packet complete with aide of [employer] Expect[facility] [employer] booklet including information on genetic screening test options.  - Patient desires level II ultrasound which was ordered.  - Patient was encouraged to start prenatal vitamins as tolerated.    - Patient was sent to lab for routine OB labs including Baseline Pre E labs d/t personal history of GHTN.     - Reviewed NO recommendation for low dose aspirin daily as she did not have Pre E or severe range BP treatment with Magnesium Sulfate  - Pregnancy concerns to be addressed by provider at new OB exam include: CNM vs MD, Due for Pap and now HPV co testing as >/= 29yo.     Pt to RTO for NOB visit in 2 weeks and prn if questions or concerns    Maura Gates, HÉCTOR IRAHETA

## 2018-10-15 NOTE — LETTER
10/15/2018       RE: Lindsey Giles   [address]  [facility] [address]     Dear Colleague,    Thank you for referring your patient, Lindsey Giles, to the [facility] at [facility] Please see a copy of my visit note below.      [facility] OB Intake note  Subjective   28 year old woman presents to clinic for initiation of OB care.  Patient's last menstrual period was 08/15/2018.   at 8w5d by Estimated Date of Delivery: May 22, 2019 based on LMP with US confirmation.  Reviewed dating ultrasound. Pregnancy is planned.      Symptoms since LMP include nausea and fatigue.  Patient has tried these relief measures: increased rest.      - Genetic/Infection questionnaire completed, risks include None  Have you traveled during the pregnancy?No  Have your sexual partner(s) travelled during the pregnancy?No    - Exercise a few time a week - long walks/runs   - Had GHTN in first pregnancy with IOL.  No severe range BPs or Magnesium Sulfate per pt.  Epidural, 2nd degree laceration, no issues with PP.   Breastfeeding went well.   - Pap NIL 10/2015 noted under Care Everywhere, no history of abnormal.       - Current Medications -   Current Outpatient Prescriptions   Medication Sig Dispense Refill     cholecalciferol 2000 units CAPS Take 2,000 Units by mouth daily Take one capsule daily.       Prenatal Multivit-Min-Fe-FA (PRENATAL VITAMINS) 0.8 MG TABS  30 tablet          - Co-morbids    Past Medical History:   Diagnosis Date     History of depression     Situational in high school - Lexapro. No SI or hospitalizations     History of gestational hypertension      - Risk for GDM -  NO Personal history of GDM, BMI>30, h/o prediabetes/glucose intolerance, first degree relative with GDM or DM     - The patient does not h/o Pre Eclampsia, Current multi fetal gestation, Pre Gestational Diabetes (Type 1 or Type 2), chronic hypertension, renal disease, Autoimmune disease  (systematic lupus erythematosus, antiphospholipid syndrome) so WILL NOT start low dose aspirin (81mg) starting between 12 and 28 weeks to prevent preeclampsia.   +history of GHTN, agreeable to baseline labs.     - The patient  does not have a history of spontaneous  birth so  WILL NOT consider progesterone starting at 16-20 weeks and/or serial transvaginal cervical length ultrasounds from 16-24 weeks.         PERSONAL/SOCIAL HISTORY  , lives with their family. No pets  Employment: Part time.  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 Denies past or present domestic violence, sexual and psychological abuse.  - Had situational depression in high school, treated with Lexapro.  No hospitalizations, no SI.   Did not have any flare postpartum after first delivery.     Objective  -VS: reviewed and within normal limits   -General appearance: no acute distress, patient is comfortable   NEUROLOGICAL/PSYCHIATRIC   - Orientated x3,   -Mood and affect: : normal     Assessment/Plan  Lindsey was seen today for intake.    Diagnoses and all orders for this visit:    Prenatal care, subsequent pregnancy, unspecified trimester  -     ABO/Rh type and screen  -     Hepatitis B surface antigen  -     CBC with platelets  -     HIV Antigen Antibody Combo  -     Rubella Antibody IgG Quantitative  -     Treponema Abs w Reflex to RPR and Titer  -     Cancel: Urine Culture Aerobic Bacterial  -     Hepatitis B Surface Antibody  -     Vitamin D Deficiency  -     AST  -     ALT  -     Basic metabolic panel  -     Uric acid  -     Cancel: Protein  random urine with Creat Ratio  -     Protein  random urine with Creat Ratio  -     Urine Culture Aerobic Bacterial  -     [facility] REFERRAL-PREGNANCY    History of gestational hypertension  -     CBC with platelets  -     AST  -     ALT  -     Basic metabolic panel  -     Uric acid  -     Cancel:  Protein  random urine with Creat Ratio  -     Protein  random urine with Creat Ratio  -     [facility] REFERRAL-PREGNANCY    Encounter for fetal anatomic survey  -     MAT FETAL MED CTR REFERRAL-PREGNANCY    History of depression      - Oriented to Practice, types of care, and how to reach a provider.  Pt undecided MYRTLE vs MD.  Had Family practice MD last delivery. Leaning toward CNM - given CNM info sheet. Follow up at next visit.   - Patient received 1st trimester new OB education packet complete with aide of The Expectant Family booklet including information on genetic screening test options.  - Patient desires level II ultrasound which was ordered.  - Patient was encouraged to start prenatal vitamins as tolerated.    - Patient was sent to lab for routine OB labs including Baseline Pre E labs d/t personal history of GHTN.     - Reviewed NO recommendation for low dose aspirin daily as she did not have Pre E or severe range BP treatment with Magnesium Sulfate  - Pregnancy concerns to be addressed by provider at new OB exam include: CNM vs MD, Due for Pap and now HPV co testing as >/= 31yo.     Pt to RTO for NOB visit in 2 weeks and prn if questions or concerns    Maura Gates, HÉCTOR IRAHETA

## 2018-10-16 LAB
BACTERIA SPEC CULT: NORMAL
BACTERIA SPEC CULT: NORMAL
Lab: NORMAL
RUBV IGG SERPL IA-ACNC: 71 IU/ML
SPECIMEN SOURCE: NORMAL
T PALLIDUM AB SER QL: NONREACTIVE

## 2018-10-29 ENCOUNTER — OFFICE VISIT (OUTPATIENT)
Dept: OBGYN | Facility: CLINIC | Age: 28
End: 2018-10-29
Attending: ADVANCED PRACTICE MIDWIFE
Payer: COMMERCIAL

## 2018-10-29 VITALS
DIASTOLIC BLOOD PRESSURE: 64 MMHG | HEART RATE: 73 BPM | BODY MASS INDEX: 20.93 KG/M2 | HEIGHT: 66 IN | SYSTOLIC BLOOD PRESSURE: 108 MMHG | WEIGHT: 130.2 LBS

## 2018-10-29 DIAGNOSIS — Z12.4 SCREENING FOR MALIGNANT NEOPLASM OF CERVIX: ICD-10-CM

## 2018-10-29 DIAGNOSIS — Z86.59 HISTORY OF DEPRESSION: ICD-10-CM

## 2018-10-29 DIAGNOSIS — Z34.80 PRENATAL CARE, SUBSEQUENT PREGNANCY, UNSPECIFIED TRIMESTER: Primary | ICD-10-CM

## 2018-10-29 DIAGNOSIS — Z87.59 HISTORY OF GESTATIONAL HYPERTENSION: ICD-10-CM

## 2018-10-29 DIAGNOSIS — O09.90 SUPERVISION OF HIGH RISK PREGNANCY, ANTEPARTUM: ICD-10-CM

## 2018-10-29 PROCEDURE — G0145 SCR C/V CYTO,THINLAYER,RESCR: HCPCS | Performed by: ADVANCED PRACTICE MIDWIFE

## 2018-10-29 PROCEDURE — 87591 N.GONORRHOEAE DNA AMP PROB: CPT | Performed by: ADVANCED PRACTICE MIDWIFE

## 2018-10-29 PROCEDURE — 87624 HPV HI-RISK TYP POOLED RSLT: CPT | Performed by: ADVANCED PRACTICE MIDWIFE

## 2018-10-29 PROCEDURE — 87491 CHLMYD TRACH DNA AMP PROBE: CPT | Performed by: ADVANCED PRACTICE MIDWIFE

## 2018-10-29 ASSESSMENT — ANXIETY QUESTIONNAIRES
1. FEELING NERVOUS, ANXIOUS, OR ON EDGE: NOT AT ALL
5. BEING SO RESTLESS THAT IT IS HARD TO SIT STILL: NOT AT ALL
GAD7 TOTAL SCORE: 4
2. NOT BEING ABLE TO STOP OR CONTROL WORRYING: NOT AT ALL
7. FEELING AFRAID AS IF SOMETHING AWFUL MIGHT HAPPEN: NOT AT ALL
3. WORRYING TOO MUCH ABOUT DIFFERENT THINGS: SEVERAL DAYS
6. BECOMING EASILY ANNOYED OR IRRITABLE: SEVERAL DAYS

## 2018-10-29 ASSESSMENT — PAIN SCALES - GENERAL: PAINLEVEL: NO PAIN (0)

## 2018-10-29 ASSESSMENT — PATIENT HEALTH QUESTIONNAIRE - PHQ9
5. POOR APPETITE OR OVEREATING: MORE THAN HALF THE DAYS
SUM OF ALL RESPONSES TO PHQ QUESTIONS 1-9: 3

## 2018-10-29 NOTE — LETTER
November 5, 2018      Lindsey Giles  [address]  [place] [address]    Dear ,      I am happy to inform you that your recent cervical cancer screening test (PAP smear) was normal.      Preventative screenings such as this help to ensure your health for years to come. You should repeat a pap smear in 3 years, unless otherwise directed.      You will still need to return to the clinic every year for your annual exam and other preventive tests.     If you have additional questions regarding this result,please call [phone].      Sincerely,      HÉCTOR Ji CNM

## 2018-10-29 NOTE — LETTER
10/29/2018       RE: Lindsey Giles   [address]  [place] [address]     Dear Colleague,    Thank you for referring your patient, Lindsey Giles, to the [facility] at [facility]. Please see a copy of my visit note below.    SUBJECTIVE:   Lindsey is a 28 year old female who presents to clinic for a new OB visit.   at 10w5d with Estimated Date of Delivery: May 22, 2019 based on US confirms. Feels well. Has started PNV.     She has not had bleeding since her LMP.   She has had mild nausea. Weight loss has not occurred.   This was a planned pregnancy.   FOB is involved     OTHER CONCERNS: Patient states she is feeling well with no additional concerns today.    ===========================================    PSYCHIATRIC:  Hx Situational Depression in High School, Denies any current concerns  PHQ-9 score:    PHQ-9 SCORE 10/29/2018   Total Score 3     ARMEN-7 SCORE 10/29/2018   Total Score 4         Past History:  Her past medical history   Past Medical History:   Diagnosis Date     History of depression     Situational in high school - Lexapro. No SI or hospitalizations     History of gestational hypertension    .     Since her last LMP she denies use of alcohol, tobacco and street drugs.  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 3-4xweek    How much calcium per day? Dietary and PNV       How much caffeine per day? None    How much vitamin D per day? Dietary and supplement    Do you/your family wear seatbelts?  Yes    Do you/your family use safety helmets? Yes    Do you/your family use sunscreen? Yes    Do you/your family keep firearms in the home? No    Do you/your family have a smoke detector(s)? Yes        Do you feel safe in your home? Yes    Has anyone ever touched you in an unwanted manner? No             Maura ANAYA, CNM         Current Outpatient Prescriptions   Medication Sig     cholecalciferol 2000 units CAPS Take 2,000 Units by mouth daily Take one capsule daily.     Prenatal Multivit-Min-Fe-FA (PRENATAL VITAMINS) 0.8 MG TABS      No current facility-administered medications for this visit.      No Known Allergies    ============================================  MEDICAL HISTORY  Past Medical History:   Diagnosis Date     History of depression     Situational in high school - Lexapro. No SI or hospitalizations     History of gestational hypertension 2017     Past Surgical History:   Procedure Laterality Date     NO HISTORY OF SURGERY         Obstetric History       T1      L1     SAB0   TAB0   Ectopic0   Multiple0   Live Births1       # Outcome Date GA Lbr Maycol/2nd Weight Sex Delivery Anes PTL Lv   2 Current            1 Term 2017 40w1d / 00:23 3.771 kg (8 lb 5 oz) M Vag-Spont EPI N BELLA      Complications: Preeclampsia/Hypertension      Obstetric Comments   No PPH, no GDM. +Gestational Hypertension with IOL - no Magnesium Sulfate         GYN History- Denies Abnormal Pap Smears                        Cervical procedures: None                        History of STI: Denies     I personally reviewed the past social/family/medical and surgical history on the date of service.   I reviewed lab work done at  "Intake visit with patient.    EXAM:  /64  Pulse 73  Ht 1.676 m (5' 5.98\")  Wt 59.1 kg (130 lb 3.2 oz)  LMP 08/15/2018  BMI 21.03 kg/m2   EXAM:  GENERAL:  Pleasant pregnant female, alert, cooperative and well groomed.  SKIN:  Warm and dry, without lesions or rashes  HEAD: Symmetrical features.  MOUTH:  Buccal mucosa pink, moist without lesions.  Teeth in good repair.    NECK:  Thyroid without enlargement and nodules.  Lymph nodes not palpable.   LUNGS:  Clear to auscultation.  BREAST:    No dominant, fixed or suspicious masses are noted.  No skin or nipple changes or axillary nodes.   Nipples everted.      HEART:  RRR without murmur.  ABDOMEN: Soft without masses , tenderness or organomegaly.  No CVA tenderness.  Uterus palpable at size equal to dates.  No scars noted.. Fetal heart tones present.  MUSCULOSKELETAL:  Full range of motion  EXTREMITIES:  No edema. No significant varicosities.   PELVIC EXAM:  GENITALIA: EGBUS  External genitalia, Bartholin's glands, urethra & Fenwood's glands:normal. Vulva reveals no erythema or lesions.         VAGINA:  pink, normal rugae and discharge, no lesions, good tone.   CERVIX:  smooth, without discharge or CMT.                UTERUS: Midposition,  nontender 10 week size.   ADNEXA:  Without masses or tenderness.   RECTAL:  Normal appearance.  Digital exam deferred.  WET PREP:Not done  GC/CHLAMYDIA CULTURE OBTAINED:YES    Last PAP: 10/25/2015 NIL        ASSESSMENT:  28 year old , 10w5d weeks of pregnancy with AIME of May 22, 2019 by US confirms  Intrauterine pregnancy 10w5d size consistent with dates  Genetic Screening: Level 2 Ultrasound only    ICD-10-CM    1. [facility] CNM vs MD   Z34.80 Neisseria gonorrhoeae PCR     Chlamydia trachomatis PCR     Pap imaged thin layer screen with HPV - recommended age 30 - 65 years (select HPV order below)     HPV High Risk Types DNA Cervical   2. History of gestational hypertension Z87.59    3. History of depression Z86.59    4. " "Screening for malignant neoplasm of cervix Z12.4 Pap imaged thin layer screen with HPV - recommended age 30 - 65 years (select HPV order below)     HPV High Risk Types DNA Cervical   5. Supervision of high risk pregnancy, antepartum O09.90 Neisseria gonorrhoeae PCR     Chlamydia trachomatis PCR     Pap imaged thin layer screen with HPV - recommended age 30 - 65 years (select HPV order below)     HPV High Risk Types DNA Cervical       PLAN:  - Reviewed use of triage nurse line and contacting the on-call provider after hours for an urgent need such as fever, vagina bleeding, bladder or vaginal infection, rupture of membranes,  or term labor.    - Reviewed best evidence for: weight gain for her weight and height for pregnancy:  Based on pre-pregnancy weight of 130lbs  and Body mass index is 21.03 kg/(m^2). RECOMMENDED WEIGHT GAIN: 25-35 lbs.  - Reviewed healthy diet and foods to avoid; exercise and activity during pregnancy; avoiding exposure to toxoplasmosis; and maintenance of a generally healthy lifestyle.   - Discussed the harms, benefits, side effects and alternative therapies for current prescribed and OTC medications.  - Reviewed high risk for Pre Eclampsia d/t Hx of GHTN.  Additionally discussed baseline HELLP labs with a a focus on P/C 0.29.  Reviewed S/S related to Pre-E to include visual disturbance, un-relievable HA, RUQ pain, and sudden onset swelling to include who and when to contact for further evaluation.   - Discussed Vitamin D levels at intake labs of 26ug/L and recommendation of taking 2,000 unit supplement daily.    - All pt's  questions discussed and answered.  Pt verbalized understanding of and agreement to plan of care. Knows about HR rounds review of her case    - Continue scheduled prenatal care and prn if questions or concerns    \"INick, RN, SNM am serving as a scribe to document services personally performed by Cristina Hodges, APRN, CNM based on the " "provider's statements to me.\" -Nick Brown, RN, SNM    The encounter was performed by me and scribed by the SNM.  The scribed note accurately reflects my personal services and decision made by me.    Cristina Hodges, APRN CNM      "

## 2018-10-29 NOTE — NURSING NOTE
Chief Complaint   Patient presents with     Prenatal Care   nob exam 10.4 weeks today  feeling ok  Pap smear due 4-2019

## 2018-10-29 NOTE — MR AVS SNAPSHOT
After Visit Summary   10/29/2018    Lindsey Giles    MRN: [id number]           Patient Information     Date Of Birth          1990        Visit Information        Provider Department      10/29/2018 8:30 AM Cristina Hodges APRN CNM [facility]        Today's Diagnoses     [facility] MYRTLE vs MD      -  1    History of gestational hypertension        History of depression        Screening for malignant neoplasm of cervix        Supervision of high risk pregnancy, antepartum           Follow-ups after your visit        Follow-up notes from your care team     Return in about 4 weeks (around 2018) for YOLANDA.      Your next 10 appointments already scheduled     2018  8:15 AM CST   RETURN OB with HÉCTOR Mosher CNM   [facility] ([facility])    [facility]  [address]  [address]  [facility] [address] [phone]            Dec 19, 2018  8:45 AM CST   YANELIS US COMP with [id number]   [facility])    [address]  [facility] [id number] [phone]           Wear comfortable clothes and leave your valuables at home.            Dec 19, 2018  9:15 AM CST   Radiology MD with UR YANELIS BUCHANAN   [facility])    [address]  [facility] [address] [phone]           Please arrive at the time given for your first appointment. This visit is used internally to schedule the physician's time during your ultrasound.              Who to contact     Please call your clinic at [phone] to:    Ask questions about your health    Make or cancel appointments    Discuss your medicines    Learn about your test results    Speak to your doctor            Additional Information About Your Visit        MyChart Information      "[employer] is an electronic gateway that provides easy, online access to your medical records. With [employer], you can request a clinic appointment, read your test results, renew a prescription or communicate with your care team.     To sign up for [employer] visit the website at [email or web address]   You will be asked to enter the access code listed below, as well as some personal information. Please follow the directions to create your username and password.     Your access code is: [email or web address]  Expires: 2019  9:11 AM     Your access code will  in 90 days. If you need help or a new code, please contact your [facility] or call [phone] for assistance.        Care EveryWhere ID     This is your Care EveryWhere ID. This could be used by other organizations to access your [facility] medical records  [id number]        Your Vitals Were     Pulse Height Last Period BMI (Body Mass Index)          73 1.676 m (5' 5.98\") 08/15/2018 21.03 kg/m2         Blood Pressure from Last 3 Encounters:   10/29/18 108/64   10/15/18 114/71   12/10/17 100/68    Weight from Last 3 Encounters:   10/29/18 59.1 kg (130 lb 3.2 oz)   10/15/18 59.1 kg (130 lb 6.4 oz)   12/10/17 59 kg (130 lb)              We Performed the Following     Chlamydia trachomatis PCR     HPV High Risk Types DNA Cervical     Neisseria gonorrhoeae PCR     Pap imaged thin layer screen with HPV - recommended age 30 - 65 years (select HPV order below)        Primary Care Provider Office Phone # Fax #    Luli Llanes [phone]       [place] [address]        Equal Access to Services     CONNOR SCHAFER : Cara Landers, wathelma stafford, emma kaalmada josefina, maximo simmons. So [facility] [phone].    ATENCIÓN: Si habla español, tiene a hicks disposición servicios gratuitos de asistencia lingüística. Llame al " [phone].    We comply with applicable federal civil rights laws and [place] laws. We do not discriminate on the basis of race, color, national origin, age, disability, sex, sexual orientation, or gender identity.            Thank you!     Thank you for choosing [facility]  for your care. Our goal is always to provide you with excellent care. Hearing back from our patients is one way we can continue to improve our services. Please take a few minutes to complete the written survey that you may receive in the mail after your visit with us. Thank you!             Your Updated Medication List - Protect others around you: Learn how to safely use, store and throw away your medicines at www.disposemymeds.org.          This list is accurate as of 10/29/18  1:09 PM.  Always use your most recent med list.                   Brand Name Dispense Instructions for use Diagnosis    cholecalciferol 2000 units Caps      Take 2,000 Units by mouth daily Take one capsule daily.    Prenatal care, subsequent pregnancy, unspecified trimester       Prenatal Vitamins 0.8 MG Tabs     30 tablet     Prenatal care, subsequent pregnancy, unspecified trimester

## 2018-10-29 NOTE — PROGRESS NOTES
SUBJECTIVE:   Lindsey is a 28 year old female who presents to clinic for a new OB visit.   at 10w5d with Estimated Date of Delivery: May 22, 2019 based on US confirms. Feels well. Has started PNV.     She has not had bleeding since her LMP.   She has had mild nausea. Weight loss has not occurred.   This was a planned pregnancy.   FOB is involved     OTHER CONCERNS: Patient states she is feeling well with no additional concerns today.    ===========================================   ROS: 10 point ROS neg other than the symptoms noted above in the HPI.      PSYCHIATRIC:  Hx Situational Depression in High School, Denies any current concerns  PHQ-9 score:    PHQ-9 SCORE 10/29/2018   Total Score 3     ARMEN-7 SCORE 10/29/2018   Total Score 4         Past History:  Her past medical history   Past Medical History:   Diagnosis Date     History of depression     Situational in high school - Lexapro. No SI or hospitalizations     History of gestational hypertension    .     Since her last LMP she denies use of alcohol, tobacco and street drugs.  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 3-4xweek    How much calcium per day? Dietary and PNV       How much caffeine per day? None    How much vitamin D  "per day? Dietary and supplement    Do you/your family wear seatbelts?  Yes    Do you/your family use safety helmets? Yes    Do you/your family use sunscreen? Yes    Do you/your family keep firearms in the home? No    Do you/your family have a smoke detector(s)? Yes        Do you feel safe in your home? Yes    Has anyone ever touched you in an unwanted manner? No             Maura Gates APRN, CNM         Current Outpatient Prescriptions   Medication Sig     cholecalciferol 2000 units CAPS Take 2,000 Units by mouth daily Take one capsule daily.     Prenatal Multivit-Min-Fe-FA (PRENATAL VITAMINS) 0.8 MG TABS      No current facility-administered medications for this visit.      No Known Allergies    ============================================  MEDICAL HISTORY  Past Medical History:   Diagnosis Date     History of depression     Situational in high school - Lexapro. No SI or hospitalizations     History of gestational hypertension      Past Surgical History:   Procedure Laterality Date     NO HISTORY OF SURGERY         Obstetric History       T1      L1     SAB0   TAB0   Ectopic0   Multiple0   Live Births1       # Outcome Date GA Lbr Maycol/2nd Weight Sex Delivery Anes PTL Lv   2 Current            1 Term 2017 40w1d / 00:23 3.771 kg (8 lb 5 oz) M Vag-Spont EPI N BELLA      Complications: Preeclampsia/Hypertension      Obstetric Comments   No PPH, no GDM. +Gestational Hypertension with IOL - no Magnesium Sulfate         GYN History- Denies Abnormal Pap Smears                        Cervical procedures: None                        History of STI: Denies     I personally reviewed the past social/family/medical and surgical history on the date of service.   I reviewed lab work done at Intake visit with patient.    EXAM:  /64  Pulse 73  Ht 1.676 m (5' 5.98\")  Wt 59.1 kg (130 lb 3.2 oz)  LMP 08/15/2018  BMI 21.03 kg/m2   EXAM:  GENERAL:  Pleasant pregnant female, alert, cooperative and well " groomed.  SKIN:  Warm and dry, without lesions or rashes  HEAD: Symmetrical features.  MOUTH:  Buccal mucosa pink, moist without lesions.  Teeth in good repair.    NECK:  Thyroid without enlargement and nodules.  Lymph nodes not palpable.   LUNGS:  Clear to auscultation.  BREAST:    No dominant, fixed or suspicious masses are noted.  No skin or nipple changes or axillary nodes.   Nipples everted.      HEART:  RRR without murmur.  ABDOMEN: Soft without masses , tenderness or organomegaly.  No CVA tenderness.  Uterus palpable at size equal to dates.  No scars noted.. Fetal heart tones present.  MUSCULOSKELETAL:  Full range of motion  EXTREMITIES:  No edema. No significant varicosities.   PELVIC EXAM:  GENITALIA: EGBUS  External genitalia, Bartholin's glands, urethra & [place]'s glands:normal. Vulva reveals no erythema or lesions.         VAGINA:  pink, normal rugae and discharge, no lesions, good tone.   CERVIX:  smooth, without discharge or CMT.                UTERUS: Midposition,  nontender 10 week size.   ADNEXA:  Without masses or tenderness.   RECTAL:  Normal appearance.  Digital exam deferred.  WET PREP:Not done  GC/CHLAMYDIA CULTURE OBTAINED:YES    Last PAP: 10/25/2015 NIL        ASSESSMENT:  28 year old , 10w5d weeks of pregnancy with AIME of May 22, 2019 by US confirms  Intrauterine pregnancy 10w5d size consistent with dates  Genetic Screening: Level 2 Ultrasound only    ICD-10-CM    1. [facility] CNM vs MD   Z34.80 Neisseria gonorrhoeae PCR     Chlamydia trachomatis PCR     Pap imaged thin layer screen with HPV - recommended age 30 - 65 years (select HPV order below)     HPV High Risk Types DNA Cervical   2. History of gestational hypertension Z87.59    3. History of depression Z86.59    4. Screening for malignant neoplasm of cervix Z12.4 Pap imaged thin layer screen with HPV - recommended age 30 - 65 years (select HPV order below)     HPV High Risk Types DNA Cervical   5. Supervision of high risk  "pregnancy, antepartum O09.90 Neisseria gonorrhoeae PCR     Chlamydia trachomatis PCR     Pap imaged thin layer screen with HPV - recommended age 30 - 65 years (select HPV order below)     HPV High Risk Types DNA Cervical       PLAN:  - Reviewed use of triage nurse line and contacting the on-call provider after hours for an urgent need such as fever, vagina bleeding, bladder or vaginal infection, rupture of membranes,  or term labor.    - Reviewed best evidence for: weight gain for her weight and height for pregnancy:  Based on pre-pregnancy weight of 130lbs  and Body mass index is 21.03 kg/(m^2). RECOMMENDED WEIGHT GAIN: 25-35 lbs.  - Reviewed healthy diet and foods to avoid; exercise and activity during pregnancy; avoiding exposure to toxoplasmosis; and maintenance of a generally healthy lifestyle.   - Discussed the harms, benefits, side effects and alternative therapies for current prescribed and OTC medications.  - Reviewed high risk for Pre Eclampsia d/t Hx of GHTN.  Additionally discussed baseline HELLP labs with a a focus on P/C 0.29.  Reviewed S/S related to Pre-E to include visual disturbance, un-relievable HA, RUQ pain, and sudden onset swelling to include who and when to contact for further evaluation.   - Discussed Vitamin D levels at intake labs of 26ug/L and recommendation of taking 2,000 unit supplement daily.    - All pt's  questions discussed and answered.  Pt verbalized understanding of and agreement to plan of care. Knows about HR rounds review of her case    - Continue scheduled prenatal care and prn if questions or concerns    \"I, Nick Brown RN, SNM am serving as a scribe to document services personally performed by HÉCTOR Lafleur CNM based on the provider's statements to me.\" -Nick Brown RN, SNM    The encounter was performed by me and scribed by the SNM.  The scribed note accurately reflects my personal services and decision made by me.    Cristina " Florencia Hodges, APRN CNM

## 2018-10-30 LAB
C TRACH DNA SPEC QL NAA+PROBE: NEGATIVE
N GONORRHOEA DNA SPEC QL NAA+PROBE: NEGATIVE
SPECIMEN SOURCE: NORMAL
SPECIMEN SOURCE: NORMAL

## 2018-10-30 ASSESSMENT — ANXIETY QUESTIONNAIRES: GAD7 TOTAL SCORE: 4

## 2018-10-31 LAB
COPATH REPORT: NORMAL
PAP: NORMAL

## 2018-11-02 LAB
FINAL DIAGNOSIS: NORMAL
HPV HR 12 DNA CVX QL NAA+PROBE: NEGATIVE
HPV16 DNA SPEC QL NAA+PROBE: NEGATIVE
HPV18 DNA SPEC QL NAA+PROBE: NEGATIVE
SPECIMEN DESCRIPTION: NORMAL
SPECIMEN SOURCE CVX/VAG CYTO: NORMAL

## 2018-11-28 ENCOUNTER — OFFICE VISIT (OUTPATIENT)
Dept: INTERNAL MEDICINE | Facility: CLINIC | Age: 28
End: 2018-11-28
Attending: INTERNAL MEDICINE
Payer: COMMERCIAL

## 2018-11-28 ENCOUNTER — OFFICE VISIT (OUTPATIENT)
Dept: OBGYN | Facility: CLINIC | Age: 28
End: 2018-11-28
Attending: ADVANCED PRACTICE MIDWIFE
Payer: COMMERCIAL

## 2018-11-28 VITALS
DIASTOLIC BLOOD PRESSURE: 74 MMHG | WEIGHT: 136.3 LBS | SYSTOLIC BLOOD PRESSURE: 117 MMHG | BODY MASS INDEX: 21.9 KG/M2 | HEIGHT: 66 IN | HEART RATE: 86 BPM

## 2018-11-28 VITALS
BODY MASS INDEX: 21.86 KG/M2 | HEIGHT: 66 IN | HEART RATE: 86 BPM | SYSTOLIC BLOOD PRESSURE: 117 MMHG | WEIGHT: 136 LBS | DIASTOLIC BLOOD PRESSURE: 74 MMHG

## 2018-11-28 DIAGNOSIS — Z34.80 PRENATAL CARE, SUBSEQUENT PREGNANCY, UNSPECIFIED TRIMESTER: Primary | ICD-10-CM

## 2018-11-28 DIAGNOSIS — Z87.59 HISTORY OF GESTATIONAL HYPERTENSION: Primary | ICD-10-CM

## 2018-11-28 LAB
ALBUMIN UR-MCNC: NEGATIVE MG/DL
APPEARANCE UR: CLEAR
BACTERIA #/AREA URNS HPF: ABNORMAL /HPF
BILIRUB UR QL STRIP: NEGATIVE
COLOR UR AUTO: ABNORMAL
CREAT UR-MCNC: 20 MG/DL
GLUCOSE UR STRIP-MCNC: NEGATIVE MG/DL
HGB UR QL STRIP: NEGATIVE
KETONES UR STRIP-MCNC: NEGATIVE MG/DL
LEUKOCYTE ESTERASE UR QL STRIP: ABNORMAL
MUCOUS THREADS #/AREA URNS LPF: PRESENT /LPF
NITRATE UR QL: NEGATIVE
PH UR STRIP: 7 PH (ref 5–7)
PROT UR-MCNC: 0.06 G/L
PROT/CREAT 24H UR: 0.29 G/G CR (ref 0–0.2)
RBC #/AREA URNS AUTO: <1 /HPF (ref 0–2)
SOURCE: ABNORMAL
SP GR UR STRIP: 1.01 (ref 1–1.03)
SQUAMOUS #/AREA URNS AUTO: 1 /HPF (ref 0–1)
UROBILINOGEN UR STRIP-MCNC: NORMAL MG/DL (ref 0–2)
WBC #/AREA URNS AUTO: 8 /HPF (ref 0–5)

## 2018-11-28 PROCEDURE — G0463 HOSPITAL OUTPT CLINIC VISIT: HCPCS | Mod: ZF

## 2018-11-28 PROCEDURE — 84156 ASSAY OF PROTEIN URINE: CPT | Performed by: INTERNAL MEDICINE

## 2018-11-28 PROCEDURE — 81001 URINALYSIS AUTO W/SCOPE: CPT | Performed by: INTERNAL MEDICINE

## 2018-11-28 PROCEDURE — 87086 URINE CULTURE/COLONY COUNT: CPT | Performed by: INTERNAL MEDICINE

## 2018-11-28 ASSESSMENT — PAIN SCALES - GENERAL
PAINLEVEL: NO PAIN (0)
PAINLEVEL: NO PAIN (0)

## 2018-11-28 ASSESSMENT — ANXIETY QUESTIONNAIRES
GAD7 TOTAL SCORE: 3
1. FEELING NERVOUS, ANXIOUS, OR ON EDGE: SEVERAL DAYS
7. FEELING AFRAID AS IF SOMETHING AWFUL MIGHT HAPPEN: NOT AT ALL
2. NOT BEING ABLE TO STOP OR CONTROL WORRYING: NOT AT ALL
5. BEING SO RESTLESS THAT IT IS HARD TO SIT STILL: NOT AT ALL
6. BECOMING EASILY ANNOYED OR IRRITABLE: SEVERAL DAYS
3. WORRYING TOO MUCH ABOUT DIFFERENT THINGS: NOT AT ALL

## 2018-11-28 ASSESSMENT — PATIENT HEALTH QUESTIONNAIRE - PHQ9
5. POOR APPETITE OR OVEREATING: SEVERAL DAYS
SUM OF ALL RESPONSES TO PHQ QUESTIONS 1-9: 1

## 2018-11-28 NOTE — MR AVS SNAPSHOT
After Visit Summary   11/28/2018    Lindsey Giles    MRN: [id number]           Patient Information     Date Of Birth          1990        Visit Information        Provider Department      11/28/2018 9:00 AM Cami Cordoba MD [facility]         Today's Diagnoses     History of gestational hypertension    -  1       Follow-ups after your visit        Your next 10 appointments already scheduled     Dec 19, 2018  8:45 AM CST   MFM US COMP with [id number]   [facility])    [address]  [facility] [id number] [phone]           Wear comfortable clothes and leave your valuables at home.            Dec 19, 2018  9:15 AM CST   Radiology MD with UR YANELIS BUCHANAN   [facility])    [address]  [facility] [address] [phone]           Please arrive at the time given for your first appointment. This visit is used internally to schedule the physician's time during your ultrasound.            Jan 02, 2019  8:45 AM CST   RETURN OB with HÉCTOR Perry CNM   [facility] ([facility])    [facility]  [address]  [address]  [facility] [address] [phone]              Who to contact     Please call your clinic at [phone] to:    Ask questions about your health    Make or cancel appointments    Discuss your medicines    Learn about your test results    Speak to your doctor            Additional Information About Your Visit        MyChart Information     [employer] gives you secure access to your electronic health record. If you see a primary care provider, you can also send messages to your care team and make appointments. If you have questions, please call your primary care clinic.  If you do not have a primary care  "provider, please call [phone] and they will assist you.      [employer] is an electronic gateway that provides easy, online access to your medical records. With [employer], you can request a clinic appointment, read your test results, renew a prescription or communicate with your care team.     To access your existing account, please contact your [facility] or call [phone] for assistance.        Care EveryWhere ID     This is your Care EveryWhere ID. This could be used by other organizations to access your [facility] medical records  [id number]        Your Vitals Were     Pulse Height Last Period Breastfeeding? BMI (Body Mass Index)       86 1.676 m (5' 6\") 08/15/2018 No 21.95 kg/m2        Blood Pressure from Last 3 Encounters:   11/28/18 117/74   11/28/18 117/74   10/29/18 108/64    Weight from Last 3 Encounters:   11/28/18 61.7 kg (136 lb)   11/28/18 61.8 kg (136 lb 4.8 oz)   10/29/18 59.1 kg (130 lb 3.2 oz)              We Performed the Following     Protein  random urine with Creat Ratio     Routine UA with micro reflex to culture          Today's Medication Changes          These changes are accurate as of 11/28/18  9:36 AM.  If you have any questions, ask your nurse or doctor.               Start taking these medicines.        Dose/Directions    aspirin 81 MG EC tablet   Commonly known as:  ASA   Used for:  History of gestational hypertension   Started by:  Cami Cordoba MD        Dose:  81 mg   Take 1 tablet (81 mg) by mouth daily   Quantity:  90 tablet   Refills:  3            Where to get your medicines      These medications were sent to [id number] - [place], [place] - [address] AT [facility] [facility] [address]  [address], [place] [address]     Phone:  [phone]     aspirin 81 MG EC tablet                Primary Care Provider Office Phone # Fax #    Luli Llanes [phone]       [address] [facility] [address]        Equal Access to Services     CONNOR SCHAFER : Hadii aad ku hadmodestobrandon Federicoali, waemelinada didiarnieha, qahaseebta minhshymaximo fierro. So [facility] [phone].    ATENCIÓN: Si habla español, tiene a hicks disposición servicios gratuitos de asistencia lingüística. Jeanieame al [phone].    We comply with applicable federal civil rights laws and [place] laws. We do not discriminate on the basis of race, color, national origin, age, disability, sex, sexual orientation, or gender identity.            Thank you!     Thank you for choosing [facility]   for your care. Our goal is always to provide you with excellent care. Hearing back from our patients is one way we can continue to improve our services. Please take a few minutes to complete the written survey that you may receive in the mail after your visit with us. Thank you!             Your Updated Medication List - Protect others around you: Learn how to safely use, store and throw away your medicines at www.disposemymeds.org.          This list is accurate as of 11/28/18  9:36 AM.  Always use your most recent med list.                   Brand Name Dispense Instructions for use Diagnosis    aspirin 81 MG EC tablet    ASA    90 tablet    Take 1 tablet (81 mg) by mouth daily    History of gestational hypertension       cholecalciferol 2000 units Caps      Take 2,000 Units by mouth daily Take one capsule daily.    Prenatal care, subsequent pregnancy, unspecified trimester       Prenatal Vitamins 0.8 MG Tabs     30 tablet     Prenatal care, subsequent pregnancy, unspecified trimester

## 2018-11-28 NOTE — PROGRESS NOTES
HPI  Patient is here for evaluation of abnormal labs. She report that she was induced a day prior to her due date. She also had protein in the urine. Her baby was 8 lb 5 oz. She denies reports that she had lower extremity edema prior to delivery. She reports that she had no headaches or vision changes.     Review of Systems     Constitutional:  Negative for fever, chills and fatigue.   HENT:  Negative for dry mouth, sinus pain and sinus congestion.    Eyes:  Negative for decreased vision.   Respiratory:   Negative for cough and dyspnea on exertion.    Cardiovascular:  Negative for chest pain, dyspnea on exertion and edema.   Gastrointestinal:  Negative for nausea, vomiting, abdominal pain, diarrhea and constipation.   Musculoskeletal:  Negative for back pain and arthralgias.   Skin:  Negative for itching and rash.   Endo/Heme:  Negative for anemia, swollen glands and bruises/bleeds easily.   Psychiatric/Behavioral:  Negative for depression, decreased concentration, mood swings and panic attacks.    Endocrine:  Negative for altered temperature regulation, polyphagia, polydipsia, unwanted hair growth and change in facial hair.    Current Outpatient Prescriptions   Medication     cholecalciferol 2000 units CAPS     Prenatal Multivit-Min-Fe-FA (PRENATAL VITAMINS) 0.8 MG TABS     No current facility-administered medications for this visit.      Past Medical History:   Diagnosis Date     History of depression     Situational in high school - Lexapro. 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 3-4xweek    How much calcium per day? Dietary and PNV       How much caffeine per day? None    How much vitamin D per day? Dietary and supplement    Do you/your family wear seatbelts?  Yes    Do you/your family use safety helmets? Yes    Do you/your family use sunscreen? Yes    Do you/your family keep firearms in the home? No    Do you/your family have a smoke detector(s)? Yes        Do you feel safe in your home? Yes    Has anyone ever touched you in an unwanted manner? No             Maura Gates APRN, CNM           Vitals:    11/28/18 0856   BP: 117/74   Pulse: 86   Weight: 61.7 kg (136 lb)   Height: 1.676 m (5' 6\")       Physical Exam   Constitutional: She is oriented to person, place, and time and well-developed, well-nourished, and in no distress.   HENT:   Head: Normocephalic and atraumatic.   Nose: Nose normal.   Eyes: Conjunctivae are normal. Pupils are equal, round, and reactive to light.   Neck: Normal range of motion. Neck supple.   Cardiovascular: Normal rate and regular rhythm.    Pulmonary/Chest: Effort normal and breath sounds normal.   Musculoskeletal: She exhibits no edema.   Neurological: She is alert and oriented to person, place, and time.   Skin: Skin is warm and dry.   Psychiatric: Mood, memory and affect normal.   Vitals reviewed.      Assessment and Plan:  Lindsey was seen today for establish care.    Diagnoses and all orders for this visit:    History of gestational hypertension.  Reviewed patient's discharge summary from prior pregnancy.  She " likely had gestational hypertension without meeting criteria for either help syndrome or preeclampsia.  Given mild proteinuria on recent urinalysis, recommend repeating the test.  Patient was advised that she likely should be considered as a high risk for hypertensive disorders of pregnancy spectrum disorder.  Recommend blood pressure monitoring as well as low-dose aspirin therapy for reduction of preeclampsia risk.  Possible side effects of aspirin and benefits of aspirin therapy were discussed with the patient.  She does not meet criteria for starting antihypertensive therapy at this point.  Recommend follow-up in 5-6 weeks.  Signs and symptoms of preeclampsia were discussed with patient.  -     aspirin (ASA) 81 MG EC tablet; Take 1 tablet (81 mg) by mouth daily  -     Protein  random urine with Creat Ratio  -     Routine UA with micro reflex to culture  -     Creatinine urine calculation only  -     Urine Culture Aerobic Bacterial      Total time spent 45  minutes.  More than 50% of the time spent with Ms. Giles on counseling / coordinating her care    Cami Cordoba MD

## 2018-11-28 NOTE — MR AVS SNAPSHOT
After Visit Summary   2018    Lindsey Giles    MRN: [id number]           Patient Information     Date Of Birth          1990        Visit Information        Provider Department      2018 8:15 AM Slade Mott APRN CNM [facility]        Today's Diagnoses     [facility] CNM     -  1       Follow-ups after your visit        Follow-up notes from your care team     Return in about 4 weeks (around 2018) for YOLANDA Visit.      Your next 10 appointments already scheduled     Dec 19, 2018  8:45 AM BRANDI HILARIO US COMP with [id number]   [facility])    [address]  [facility] [address] [phone]           Wear comfortable clothes and leave your valuables at home.            Dec 19, 2018  9:15 AM BRANDI   Radiology MD with PRESTON HILARIO MD   [facility])    [address]  [facility] [id number] [phone]           Please arrive at the time given for your first appointment. This visit is used internally to schedule the physician's time during your ultrasound.              Who to contact     Please call your clinic at [phone] to:    Ask questions about your health    Make or cancel appointments    Discuss your medicines    Learn about your test results    Speak to your doctor            Additional Information About Your Visit        MyChart Information     [employer] gives you secure access to your electronic health record. If you see a primary care provider, you can also send messages to your care team and make appointments. If you have questions, please call your primary care clinic.  If you do not have a primary care provider, please call [phone] and they will assist you.      [employer] is an electronic gateway that provides easy, online access to your medical records. With [employer],  "you can request a clinic appointment, read your test results, renew a prescription or communicate with your care team.     To access your existing account, please contact your [facility] or call [phone] for assistance.        Care EveryWhere ID     This is your Care EveryWhere ID. This could be used by other organizations to access your [facility] medical records  [id number]        Your Vitals Were     Pulse Height Last Period BMI (Body Mass Index)          86 1.676 m (5' 6\") 08/15/2018 22 kg/m2         Blood Pressure from Last 3 Encounters:   11/28/18 117/74   10/29/18 108/64   10/15/18 114/71    Weight from Last 3 Encounters:   11/28/18 61.8 kg (136 lb 4.8 oz)   10/29/18 59.1 kg (130 lb 3.2 oz)   10/15/18 59.1 kg (130 lb 6.4 oz)              Today, you had the following     No orders found for display       Primary Care Provider Office Phone # Fax #    Luli KENIA Llanes [phone]       [place] [address]        Equal Access to Services     [facility] Hospital[facility] : Hadii aad ku hadasho Soomaali, waaxda luqadaha, qaybta kaalmada adeegyada, maximo hernandez . So [facility] [phone].    ATENCIÓN: Si habla español, tiene a hicks disposición servicios gratuitos de asistencia lingüística. LlPremier Health [facility] [phone].    We comply with applicable federal civil rights laws and [place] laws. We do not discriminate on the basis of race, color, national origin, age, disability, sex, sexual orientation, or gender identity.            Thank you!     Thank you for choosing [facility]  for your care. Our goal is always to provide you with excellent care. Hearing back from our patients is one way we can continue to improve our services. Please take a few minutes to complete the written survey that you may receive in the mail after your visit with us. Thank you!             Your Updated Medication List - " Protect others around you: Learn how to safely use, store and throw away your medicines at www.disposemymeds.org.          This list is accurate as of 11/28/18  8:34 AM.  Always use your most recent med list.                   Brand Name Dispense Instructions for use Diagnosis    cholecalciferol 2000 units Caps      Take 2,000 Units by mouth daily Take one capsule daily.    Prenatal care, subsequent pregnancy, unspecified trimester       Prenatal Vitamins 0.8 MG Tabs     30 tablet     Prenatal care, subsequent pregnancy, unspecified trimester

## 2018-11-28 NOTE — LETTER
"2018       RE: Lindsey Giles   [address]  [place] [address]     Dear Colleague,    Thank you for referring your patient, Lindsey Giles, to [facility] Please see a copy of my visit note below.    Subjective:     28 year old  at 15w0d presents for a routine prenatal appointment.      Denies vaginal bleeding or leakage of fluid.  Denies contractions or cramping.  No fetal movement yet.       No HA, visual changes, RUQ or epigastric pain.   The patient presents with the following concerns: occasional headaches, feels they are related to hydration   Level II US  Scheduled,    Offered QS/AFP, declined.     Objective:  Vitals:    18 0820   BP: 117/74   Pulse: 86   Weight: 61.8 kg (136 lb 4.8 oz)   Height: 1.676 m (5' 6\")   See OB flowsheet    Assessment/Plan:  Encounter Diagnosis   Name Primary?     [facility] CNM  Yes     - Reviewed total weight gain, encouraged continued healthy diet and exercise.      - Reviewed why/how to contact provider.   - Patient education/orders or handouts today: PTL signs/symptoms, Quad screen, AFP only, Fetal movement and Level 2 u/s scheduled   - Discussed follow-up with Dr. Cordoba for elevated pr/cr and hx of GHTN  - Return to clinic in 4 weeks and prn if questions or concerns.     Again, thank you for allowing me to participate in the care of your patient.      Sincerely,    HÉCTOR Llanos CNM      "

## 2018-11-28 NOTE — PROGRESS NOTES
"Subjective:     28 year old  at 15w0d presents for a routine prenatal appointment.      Denies vaginal bleeding or leakage of fluid.  Denies contractions or cramping.  No fetal movement yet.       No HA, visual changes, RUQ or epigastric pain.   The patient presents with the following concerns: occasional headaches, feels they are related to hydration   Level II US  Scheduled,    Offered QS/AFP, declined.     Objective:  Vitals:    18 0820   BP: 117/74   Pulse: 86   Weight: 61.8 kg (136 lb 4.8 oz)   Height: 1.676 m (5' 6\")   See OB flowsheet    Assessment/Plan:  Encounter Diagnosis   Name Primary?     [facility] CNM  Yes     - Reviewed total weight gain, encouraged continued healthy diet and exercise.      - Reviewed why/how to contact provider.   - Patient education/orders or handouts today: PTL signs/symptoms, Quad screen, AFP only, Fetal movement and Level 2 u/s scheduled   - Discussed follow-up with Dr. Cordoba for elevated pr/cr and hx of GHTN  - Return to clinic in 4 weeks and prn if questions or concerns.   "

## 2018-11-28 NOTE — LETTER
11/28/2018       RE: Lindsey Giles  [address]  [place] [address]     Dear Colleague,    Thank you for referring your patient, Lindsey Giles, to the [facility]  at [facility]. Please see a copy of my visit note below.    HPI  Patient is here for evaluation of abnormal labs. She report that she was induced a day prior to her due date. She also had protein in the urine. Her baby was 8 lb 5 oz. She denies reports that she had lower extremity edema prior to delivery. She reports that she had no headaches or vision changes.     Review of Systems     Constitutional:  Negative for fever, chills and fatigue.   HENT:  Negative for dry mouth, sinus pain and sinus congestion.    Eyes:  Negative for decreased vision.   Respiratory:   Negative for cough and dyspnea on exertion.    Cardiovascular:  Negative for chest pain, dyspnea on exertion and edema.   Gastrointestinal:  Negative for nausea, vomiting, abdominal pain, diarrhea and constipation.   Musculoskeletal:  Negative for back pain and arthralgias.   Skin:  Negative for itching and rash.   Endo/Heme:  Negative for anemia, swollen glands and bruises/bleeds easily.   Psychiatric/Behavioral:  Negative for depression, decreased concentration, mood swings and panic attacks.    Endocrine:  Negative for altered temperature regulation, polyphagia, polydipsia, unwanted hair growth and change in facial hair.    Current Outpatient Prescriptions   Medication     cholecalciferol 2000 units CAPS     Prenatal Multivit-Min-Fe-FA (PRENATAL VITAMINS) 0.8 MG TABS     No current facility-administered medications for this visit.      Past Medical History:   Diagnosis Date     History of depression     Situational in high school - Lexapro. 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 3-4xweek    How much calcium per day? Dietary and PNV       How much caffeine per day? None    How much vitamin D per day? Dietary and supplement    Do you/your family wear seatbelts?  Yes    Do you/your family use safety helmets? Yes    Do you/your family use sunscreen? Yes    Do you/your family keep firearms in the home? No    Do you/your family have a smoke detector(s)? Yes        Do you feel safe in your home? Yes    Has anyone ever touched you in an unwanted manner? No             Maura Gates APRN, CN           Vitals:    11/28/18 0856   BP: 117/74   Pulse: 86   Weight: 61.7 kg (136 lb)   Height: 1.676 m (5' 6\")       Physical Exam   Constitutional: She is oriented to person, place, and time and well-developed, well-nourished, and in no distress.   HENT:   Head: Normocephalic and atraumatic.   Nose: Nose normal.   Eyes: Conjunctivae are normal. Pupils are equal, round, and reactive to light.   Neck: Normal range of motion. Neck supple.   Cardiovascular: Normal rate and regular rhythm.    Pulmonary/Chest: Effort normal and breath sounds normal.   Musculoskeletal: She exhibits no edema.   Neurological: She is alert and oriented to person, place, and time.   Skin: Skin is warm and " dry.   Psychiatric: Mood, memory and affect normal.   Vitals reviewed.      Assessment and Plan:  Lindsey was seen today for establish care.    Diagnoses and all orders for this visit:    History of gestational hypertension.  Reviewed patient's discharge summary from prior pregnancy.  She likely had gestational hypertension without meeting criteria for either help syndrome or preeclampsia.  Given mild proteinuria on recent urinalysis, recommend repeating the test.  Patient was advised that she likely should be considered as a high risk for hypertensive disorders of pregnancy spectrum disorder.  Recommend blood pressure monitoring as well as low-dose aspirin therapy for reduction of preeclampsia risk.  Possible side effects of aspirin and benefits of aspirin therapy were discussed with the patient.  She does not meet criteria for starting antihypertensive therapy at this point.  Recommend follow-up in 5-6 weeks.  Signs and symptoms of preeclampsia were discussed with patient.  -     aspirin (ASA) 81 MG EC tablet; Take 1 tablet (81 mg) by mouth daily  -     Protein  random urine with Creat Ratio  -     Routine UA with micro reflex to culture  -     Creatinine urine calculation only  -     Urine Culture Aerobic Bacterial    Total time spent 45  minutes.  More than 50% of the time spent with Ms. Giles on counseling / coordinating her care    Cami Cordoba MD

## 2018-11-28 NOTE — NURSING NOTE
Chief Complaint   Patient presents with     Establish Care     OB 15 weeks  HX gestational HTN   Vandana Davis LPN

## 2018-11-28 NOTE — LETTER
12/5/2018         Lindsey Giles   [address] [place] [address]        Dear Ms. Giles:    Small amount of protein was seen in the urine. Culture was negative for infection.     Results for orders placed or performed in visit on 11/28/18   Protein  random urine with Creat Ratio   Result Value Ref Range    Protein Random Urine 0.06 g/L    Protein Total Urine g/gr Creatinine 0.29 (H) 0 - 0.2 g/g Cr   Routine UA with micro reflex to culture   Result Value Ref Range    Color Urine Straw     Appearance Urine Clear     Glucose Urine Negative NEG^Negative mg/dL    Bilirubin Urine Negative NEG^Negative    Ketones Urine Negative NEG^Negative mg/dL    Specific Gravity Urine 1.006 1.003 - 1.035    Blood Urine Negative NEG^Negative    pH Urine 7.0 5.0 - 7.0 pH    Protein Albumin Urine Negative NEG^Negative mg/dL    Urobilinogen mg/dL Normal 0.0 - 2.0 mg/dL    Nitrite Urine Negative NEG^Negative    Leukocyte Esterase Urine Moderate (A) NEG^Negative    Source Clean catch urine     WBC Urine 8 (H) 0 - 5 /HPF    RBC Urine <1 0 - 2 /HPF    Bacteria Urine Few (A) NEG^Negative /HPF    Squamous Epithelial /HPF Urine 1 0 - 1 /HPF    Mucous Urine Present (A) NEG^Negative /LPF   Creatinine urine calculation only   Result Value Ref Range    Creatinine Urine 20 mg/dL   Urine Culture Aerobic Bacterial   Result Value Ref Range    Specimen Description Unspecified Urine     Special Requests Specimen received in preservative     Culture Micro       10,000 to 50,000 colonies/mL  mixed urogenital vishnu  Susceptibility testing not routinely done           Please note that test explanations are brief and do not reflect all diagnostic uses.  If you have any questions or concerns, please call the clinic at [phone].      Sincerely,      Vandana Davis sent on behalf of  Cami Cordoba MD

## 2018-11-29 LAB
BACTERIA SPEC CULT: NORMAL
Lab: NORMAL
SPECIMEN SOURCE: NORMAL

## 2018-11-29 ASSESSMENT — ANXIETY QUESTIONNAIRES: GAD7 TOTAL SCORE: 3

## 2018-12-02 ASSESSMENT — ENCOUNTER SYMPTOMS
INSOMNIA: 0
DECREASED CONCENTRATION: 0
POLYPHAGIA: 0
VOMITING: 0
FATIGUE: 0
SINUS CONGESTION: 0
BRUISES/BLEEDS EASILY: 0
BACK PAIN: 0
POLYDIPSIA: 0
CHILLS: 0
DYSPNEA ON EXERTION: 0
SINUS PAIN: 0
FEVER: 0
ALTERED TEMPERATURE REGULATION: 0
DIARRHEA: 0
COUGH: 0
DEPRESSION: 0
SWOLLEN GLANDS: 0
ARTHRALGIAS: 0
NERVOUS/ANXIOUS: 0
PANIC: 0
CONSTIPATION: 0
NAUSEA: 0
ABDOMINAL PAIN: 0

## 2018-12-06 ENCOUNTER — OFFICE VISIT (OUTPATIENT)
Dept: OBGYN | Facility: CLINIC | Age: 28
End: 2018-12-06
Attending: OBSTETRICS & GYNECOLOGY
Payer: COMMERCIAL

## 2018-12-06 VITALS
WEIGHT: 134 LBS | DIASTOLIC BLOOD PRESSURE: 75 MMHG | BODY MASS INDEX: 21.63 KG/M2 | HEART RATE: 77 BPM | SYSTOLIC BLOOD PRESSURE: 122 MMHG

## 2018-12-06 DIAGNOSIS — O09.92 HRP (HIGH RISK PREGNANCY), SECOND TRIMESTER: Primary | ICD-10-CM

## 2018-12-06 ASSESSMENT — PAIN SCALES - GENERAL: PAINLEVEL: NO PAIN (0)

## 2018-12-06 NOTE — LETTER
2018       RE: Lindsey Giles   [address]  [place] [facility] [address]     Dear Colleague,    Thank you for referring your patient, Lindsey Giles, to the [facility] at [facility]. Please see a copy of my visit note below.    Patient here with  to review labs and testing thus far.  Patient has a history of gestational hypertension with IOL in last pregnancy at 40weeks.  She had baseline labs this pregnancy which were normal except that UPC elevated at 0.29.  She saw Dr. Cordoba for a consult who discussed risk of hypertensive disorders of pregnancy and recommended patient start a daily baby aspirin.  Patient states she is feeling well.  Is excited for upcoming level 2 ultrasound.  She has not yet felt fetal movement.  She denies any headaches, vision changes or abdominal pain.  Wants to know if it is ok to stay with CNM care.    Exam:  /75  Pulse 77  Wt 60.8 kg (134 lb)  LMP 08/15/2018  Breastfeeding? No  BMI 21.63 kg/m2  See OB flowsheet    Assessment/Plan:  27 yo  at 16w1d with history of gestational hypertension and now with elevated UPC  - reviewed all labs and reassured patient that remainder were normal including creatinine.  - continue ASA 81mg daily  - continue close follow up of blood pressures and repeat labs during pregnancy.  Will recommend repeat labs including UPC after delivery as well  - Patient with follow-up visit with Dr. Cordoba in 5 weeks and level 2 scheduled 2018  - RTC in 4 weeks, may continue with CNM care.  Reassured patient that we are always available should any concerns arise.  Judie Jeong MD

## 2018-12-06 NOTE — MR AVS SNAPSHOT
After Visit Summary   12/6/2018    Lindsey Giles    MRN: [id number]           Patient Information     Date Of Birth          1990        Visit Information        Provider Department      12/6/2018 10:00 AM Judie Jeong MD [facility]        Today's Diagnoses     HRP (high risk pregnancy), second trimester    -  1       Follow-ups after your visit        Follow-up notes from your care team     Return in about 4 weeks (around 1/3/2019).      Your next 10 appointments already scheduled     Dec 19, 2018  8:45 AM CST   MFM US COMP with FLOUSNARESH   [facility] [place] ([facility])    [address]  [facility] [id number] [phone]           Wear comfortable clothes and leave your valuables at home.            Dec 19, 2018  9:15 AM CST   Radiology MD with PRESTON HILARIO MD   [facility])    [address]  [facility] [id number] [phone]           Please arrive at the time given for your first appointment. This visit is used internally to schedule the physician's time during your ultrasound.            Jan 02, 2019  8:45 AM CST   RETURN OB with HÉCTOR Perry CNM   [facility] ([facility])    [facility]  [address]  [address]  [facility] [id number] [phone]            Jan 02, 2019  9:20 AM CST   Return Visit with Cami Cordoba MD   [facility]  ([facility])    [facility]  [address]  [address]  [facility]  [facility] [id number] [phone]              Who to contact     Please call your clinic at [phone] to:    Ask questions about your health    Make or cancel appointments    Discuss your medicines    Learn about your test results    Speak to your doctor             Additional Information About Your Visit        [employer] Information     [employer] gives you secure access to your electronic health record. If you see a primary care provider, you can also send messages to your care team and make appointments. If you have questions, please call your primary care clinic.  If you do not have a primary care provider, please call [phone] and they will assist you.      [employer] is an electronic gateway that provides easy, online access to your medical records. With [employer], you can request a clinic appointment, read your test results, renew a prescription or communicate with your care team.     To access your existing account, please contact your [facility] or call [phone] for assistance.        Care EveryWhere ID     This is your Care EveryWhere ID. This could be used by other organizations to access your [place] medical records  [id number]        Your Vitals Were     Pulse Last Period Breastfeeding? BMI (Body Mass Index)          77 08/15/2018 No 21.63 kg/m2         Blood Pressure from Last 3 Encounters:   12/06/18 122/75   11/28/18 117/74   11/28/18 117/74    Weight from Last 3 Encounters:   12/06/18 60.8 kg (134 lb)   11/28/18 61.7 kg (136 lb)   11/28/18 61.8 kg (136 lb 4.8 oz)              Today, you had the following     No orders found for display       Primary Care Provider Office Phone # Fax #    Luli AQUINO Shraddha [phone]       [address] [address]        Equal Access to Services     CONNOR SCHAFER : Hadii aad ku hadasho Soomaali, waaxda luqadaha, qaybta kaalmada adeegyada, waxmadi madhavi hernandez . So [facility] [phone].    ATENCIÓN: Si habla español, tiene a hicks disposición servicios gratuitos de asistencia lingüística. Llame al [phone].    We comply with applicable federal civil rights laws and [place] laws. We do not discriminate on the basis of  race, color, national origin, age, disability, sex, sexual orientation, or gender identity.            Thank you!     Thank you for choosing [facility]  for your care. Our goal is always to provide you with excellent care. Hearing back from our patients is one way we can continue to improve our services. Please take a few minutes to complete the written survey that you may receive in the mail after your visit with us. Thank you!             Your Updated Medication List - Protect others around you: Learn how to safely use, store and throw away your medicines at www.disposemymeds.org.          This list is accurate as of 12/6/18 11:59 PM.  Always use your most recent med list.                   Brand Name Dispense Instructions for use Diagnosis    aspirin 81 MG EC tablet    ASA    90 tablet    Take 1 tablet (81 mg) by mouth daily    History of gestational hypertension       cholecalciferol 2000 units Caps      Take 2,000 Units by mouth daily Take one capsule daily.    Prenatal care, subsequent pregnancy, unspecified trimester       Prenatal Vitamins 0.8 MG Tabs     30 tablet     Prenatal care, subsequent pregnancy, unspecified trimester

## 2018-12-07 NOTE — PROGRESS NOTES
Patient here with  to review labs and testing thus far.  Patient has a history of gestational hypertension with IOL in last pregnancy at 40weeks.  She had baseline labs this pregnancy which were normal except that UPC elevated at 0.29.  She saw Dr. Cordoba for a consult who discussed risk of hypertensive disorders of pregnancy and recommended patient start a daily baby aspirin.  Patient states she is feeling well.  Is excited for upcoming level 2 ultrasound.  She has not yet felt fetal movement.  She denies any headaches, vision changes or abdominal pain.  Wants to know if it is ok to stay with CNM care.    Exam:  /75  Pulse 77  Wt 60.8 kg (134 lb)  LMP 08/15/2018  Breastfeeding? No  BMI 21.63 kg/m2  See OB flowsheet    Assessment/Plan:  29 yo  at 16w1d with history of gestational hypertension and now with elevated UPC  - reviewed all labs and reassured patient that remainder were normal including creatinine.  - continue ASA 81mg daily  - continue close follow up of blood pressures and repeat labs during pregnancy.  Will recommend repeat labs including UPC after delivery as well  - Patient with follow-up visit with Dr. Cordoba in 5 weeks and level 2 scheduled 2018  - RTC in 4 weeks, may continue with CNM care.  Reassured patient that we are always available should any concerns arise.  Judie Jeong MD

## 2018-12-17 ENCOUNTER — PRE VISIT (OUTPATIENT)
Dept: MATERNAL FETAL MEDICINE | Facility: CLINIC | Age: 28
End: 2018-12-17

## 2018-12-19 ENCOUNTER — OFFICE VISIT (OUTPATIENT)
Dept: MATERNAL FETAL MEDICINE | Facility: CLINIC | Age: 28
End: 2018-12-19
Attending: ADVANCED PRACTICE MIDWIFE
Payer: COMMERCIAL

## 2018-12-19 ENCOUNTER — HOSPITAL ENCOUNTER (OUTPATIENT)
Dept: ULTRASOUND IMAGING | Facility: CLINIC | Age: 28
Discharge: HOME | End: 2018-12-19
Attending: ADVANCED PRACTICE MIDWIFE | Admitting: OBSTETRICS & GYNECOLOGY
Payer: COMMERCIAL

## 2018-12-19 DIAGNOSIS — O26.90 PREGNANCY RELATED CONDITION, ANTEPARTUM: ICD-10-CM

## 2018-12-19 DIAGNOSIS — Z87.59 HISTORY OF GESTATIONAL HYPERTENSION: Primary | ICD-10-CM

## 2018-12-19 PROCEDURE — 76811 OB US DETAILED SNGL FETUS: CPT

## 2018-12-19 NOTE — PROGRESS NOTES
"Please see \"Imaging\" tab under \"Chart Review\" for details of today's ultrasound.    Steve Fair M.D.  Specialist in Maternal-Fetal Medicine       "

## 2019-01-01 NOTE — PROGRESS NOTES
Subjective:      28 year old  at 19w6d presents for a routine prenatal appointment.       No vaginal bleeding or leakage of fluid.  No contractions or cramping.    Daily fetal movement.       No HA, visual changes, RUQ or epigastric pain.   The patient presents with the following concerns: Feeling well. Recently diagnosed with strep and taking penicillin and feeling much better. 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  Has follow up visit with Dr. Cordoba today.   Level II US  Results reviewed normal scan.      Objective:  Vitals:    19 0857   BP: 100/65   BP Location: Right arm   Patient Position: Sitting   Cuff Size: Adult Regular   Pulse: 84   Weight: 63.6 kg (140 lb 4.8 oz)     See OB flowsheet    Assessment/Plan     Encounter Diagnoses   Name Primary?     [facility] CNM  Yes     History of gestational hypertension        Orders Placed This Encounter   Medications     penicillin V (VEETID) 500 MG tablet     Sig: Take 500 mg by mouth     Refill:  0     - Reviewed total weight gain, encouraged continued healthy diet and exercise.      - Reviewed why/how to contact provider.    Patient education/orders or handouts today: PTL precautions, pregnancy warning signs   Return to clinic in 6 weeks for EOB and prn if questions or concerns.     HÉCTOR Escamilla CNM

## 2019-01-02 ENCOUNTER — OFFICE VISIT (OUTPATIENT)
Dept: OBGYN | Facility: CLINIC | Age: 29
End: 2019-01-02
Attending: ADVANCED PRACTICE MIDWIFE
Payer: COMMERCIAL

## 2019-01-02 ENCOUNTER — OFFICE VISIT (OUTPATIENT)
Dept: INTERNAL MEDICINE | Facility: CLINIC | Age: 29
End: 2019-01-02
Attending: ADVANCED PRACTICE MIDWIFE
Payer: COMMERCIAL

## 2019-01-02 VITALS
DIASTOLIC BLOOD PRESSURE: 62 MMHG | SYSTOLIC BLOOD PRESSURE: 96 MMHG | WEIGHT: 140 LBS | HEART RATE: 84 BPM | BODY MASS INDEX: 22.6 KG/M2

## 2019-01-02 VITALS
WEIGHT: 140.3 LBS | DIASTOLIC BLOOD PRESSURE: 65 MMHG | BODY MASS INDEX: 22.65 KG/M2 | HEART RATE: 84 BPM | SYSTOLIC BLOOD PRESSURE: 100 MMHG

## 2019-01-02 DIAGNOSIS — Z34.80 PRENATAL CARE, SUBSEQUENT PREGNANCY, UNSPECIFIED TRIMESTER: Primary | ICD-10-CM

## 2019-01-02 DIAGNOSIS — Z87.59 HISTORY OF GESTATIONAL HYPERTENSION: Primary | ICD-10-CM

## 2019-01-02 DIAGNOSIS — Z87.59 HISTORY OF GESTATIONAL HYPERTENSION: ICD-10-CM

## 2019-01-02 PROCEDURE — G0463 HOSPITAL OUTPT CLINIC VISIT: HCPCS | Mod: ZF

## 2019-01-02 PROCEDURE — G0463 HOSPITAL OUTPT CLINIC VISIT: HCPCS | Mod: 25,ZF

## 2019-01-02 RX ORDER — PENICILLIN V POTASSIUM 500 MG/1
500 TABLET, FILM COATED ORAL
Refills: 0 | COMMUNITY
Start: 2018-12-27 | End: 2019-04-03

## 2019-01-02 ASSESSMENT — ANXIETY QUESTIONNAIRES
3. WORRYING TOO MUCH ABOUT DIFFERENT THINGS: NOT AT ALL
1. FEELING NERVOUS, ANXIOUS, OR ON EDGE: NOT AT ALL
2. NOT BEING ABLE TO STOP OR CONTROL WORRYING: NOT AT ALL
7. FEELING AFRAID AS IF SOMETHING AWFUL MIGHT HAPPEN: NOT AT ALL
5. BEING SO RESTLESS THAT IT IS HARD TO SIT STILL: NOT AT ALL
6. BECOMING EASILY ANNOYED OR IRRITABLE: SEVERAL DAYS
GAD7 TOTAL SCORE: 1

## 2019-01-02 ASSESSMENT — PATIENT HEALTH QUESTIONNAIRE - PHQ9
SUM OF ALL RESPONSES TO PHQ QUESTIONS 1-9: 2
5. POOR APPETITE OR OVEREATING: NOT AT ALL

## 2019-01-02 ASSESSMENT — PAIN SCALES - GENERAL: PAINLEVEL: NO PAIN (0)

## 2019-01-02 NOTE — NURSING NOTE
Chief Complaint   Patient presents with     Prenatal Care     20 weeks 0 days      Health Maintenance Due   Topic Date Due     MATERNAL SCREENING  11/28/2018     OBGCT (OB)  01/30/2019     Jelena Paul CMA on 1/2/2019 at 8:57 AM

## 2019-01-02 NOTE — LETTER
1/2/2019       RE: Lindsey Giles  [address]  [place] [address]     Dear Colleague,    Thank you for referring your patient, Lindsey Giles, to the [facility]  at [facility]. Please see a copy of my visit note below.    HPI  Patient is here for follow up. She reports that she has been feeling well. She denies headaches, vision changes or lower extremity edema. She was diagnosed with strep throat on Thursday.  She reports improvement in symptoms with antibiotics.    Current Outpatient Medications   Medication     aspirin (ASA) 81 MG EC tablet     cholecalciferol 2000 units CAPS     penicillin V (VEETID) 500 MG tablet     Prenatal Multivit-Min-Fe-FA (PRENATAL VITAMINS) 0.8 MG TABS     No current facility-administered medications for this visit.      Vitals:    01/02/19 0923 01/02/19 0924 01/02/19 0925   BP: 103/67 104/69 96/62   Pulse: 84 84 84   Weight: 63.5 kg (140 lb)       Physical Exam   Constitutional: She appears well-developed and well-nourished.   HENT:   Head: Normocephalic and atraumatic.   Eyes: EOM are normal. Pupils are equal, round, and reactive to light.   Cardiovascular: Normal rate and regular rhythm.   Pulmonary/Chest: Effort normal.   Musculoskeletal: She exhibits no edema.   Neurological: She is alert.   Skin: Skin is warm and dry.   Psychiatric: She has a normal mood and affect. Her behavior is normal. Judgment and thought content normal.   Vitals reviewed.    Assessment and plan:    Lindsey was seen today for recheck.    Diagnoses and all orders for this visit:    History of gestational hypertension.  Blood pressure is currently within acceptable range.  Recommend to continue with close monitoring.  Patient was also advised to continue with aspirin therapy without changes.  She expressed understanding and agreement with the plan.    Total time spent 15 minutes.  More than 50% of the time spent with Ms. Giles on counseling /  coordinating her care    Cami Cordoba MD

## 2019-01-02 NOTE — LETTER
2019       RE: Lindsey Giles   [address]  [place] [address]     Dear Colleague,    Thank you for referring your patient, Lindsey Giles, to the [facility] at [facility]. Please see a copy of my visit note below.    Subjective:      28 year old  at 19w6d presents for a routine prenatal appointment.       No vaginal bleeding or leakage of fluid.  No contractions or cramping.    Daily fetal movement.       No HA, visual changes, RUQ or epigastric pain.   The patient presents with the following concerns: Feeling well. Recently diagnosed with strep and taking penicillin and feeling much better. 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  Has follow up visit with Dr. Cordoba today.   Level II US  Results reviewed normal scan.      Objective:  Vitals:    19 0857   BP: 100/65   BP Location: Right arm   Patient Position: Sitting   Cuff Size: Adult Regular   Pulse: 84   Weight: 63.6 kg (140 lb 4.8 oz)     See OB flowsheet    Assessment/Plan     Encounter Diagnoses   Name Primary?     [facility] CNM  Yes     History of gestational hypertension        Orders Placed This Encounter   Medications     penicillin V (VEETID) 500 MG tablet     Sig: Take 500 mg by mouth     Refill:  0     - Reviewed total weight gain, encouraged continued healthy diet and exercise.      - Reviewed why/how to contact provider.    Patient education/orders or handouts today: PTL precautions, pregnancy warning signs   Return to clinic in 6 weeks for EOB and prn if questions or concerns.     HÉCTOR EscamillaM

## 2019-01-02 NOTE — PROGRESS NOTES
HPI  Patient is here for follow up. She reports that she has been feeling well. She denies headaches, vision changes or lower extremity edema. She was diagnosed with strep throat on Thursday.  She reports improvement in symptoms with antibiotics.    Review of Systems     Constitutional:  Negative for fatigue and confused.   Respiratory:   Negative for cough, shortness of breath and respiratory pain.    Cardiovascular:  Negative for chest pain and edema.   Gastrointestinal:  Negative for heartburn, nausea, vomiting, abdominal pain, diarrhea and constipation.   Endo/Heme:  Negative for anemia, swollen glands and bruises/bleeds easily.   Psychiatric/Behavioral:  Negative for depression, decreased concentration, mood swings and panic attacks.    Endocrine:  Negative for altered temperature regulation, polyphagia, polydipsia, unwanted hair growth and change in facial hair.    Current Outpatient Medications   Medication     aspirin (ASA) 81 MG EC tablet     cholecalciferol 2000 units CAPS     penicillin V (VEETID) 500 MG tablet     Prenatal Multivit-Min-Fe-FA (PRENATAL VITAMINS) 0.8 MG TABS     No current facility-administered medications for this visit.      Vitals:    01/02/19 0923 01/02/19 0924 01/02/19 0925   BP: 103/67 104/69 96/62   Pulse: 84 84 84   Weight: 63.5 kg (140 lb)         Physical Exam   Constitutional: She appears well-developed and well-nourished.   HENT:   Head: Normocephalic and atraumatic.   Eyes: EOM are normal. Pupils are equal, round, and reactive to light.   Cardiovascular: Normal rate and regular rhythm.   Pulmonary/Chest: Effort normal.   Musculoskeletal: She exhibits no edema.   Neurological: She is alert.   Skin: Skin is warm and dry.   Psychiatric: She has a normal mood and affect. Her behavior is normal. Judgment and thought content normal.   Vitals reviewed.    Assessment and plan:      Lindsey was seen today for recheck.    Diagnoses and all orders for this visit:    History of gestational  hypertension.  Blood pressure is currently within acceptable range.  Recommend to continue with close monitoring.  Patient was also advised to continue with aspirin therapy without changes.  She expressed understanding and agreement with the plan.    Total time spent 15 minutes.  More than 50% of the time spent with Ms. Giles on counseling / coordinating her care    Cami Cordoba MD

## 2019-01-03 ASSESSMENT — ANXIETY QUESTIONNAIRES: GAD7 TOTAL SCORE: 1

## 2019-01-06 ASSESSMENT — ENCOUNTER SYMPTOMS
DIARRHEA: 0
POLYDIPSIA: 0
INSOMNIA: 0
ALTERED TEMPERATURE REGULATION: 0
RESPIRATORY PAIN: 0
SWOLLEN GLANDS: 0
DECREASED CONCENTRATION: 0
DEPRESSION: 0
ABDOMINAL PAIN: 0
BRUISES/BLEEDS EASILY: 0
POLYPHAGIA: 0
PANIC: 0
VOMITING: 0
NAUSEA: 0
SHORTNESS OF BREATH: 0
COUGH: 0
CONSTIPATION: 0
HEARTBURN: 0
NERVOUS/ANXIOUS: 0
FATIGUE: 0

## 2019-02-20 ENCOUNTER — OFFICE VISIT (OUTPATIENT)
Dept: OBGYN | Facility: CLINIC | Age: 29
End: 2019-02-20
Attending: INTERNAL MEDICINE
Payer: COMMERCIAL

## 2019-02-20 ENCOUNTER — TELEPHONE (OUTPATIENT)
Dept: OBGYN | Facility: CLINIC | Age: 29
End: 2019-02-20

## 2019-02-20 ENCOUNTER — OFFICE VISIT (OUTPATIENT)
Dept: INTERNAL MEDICINE | Facility: CLINIC | Age: 29
End: 2019-02-20
Attending: INTERNAL MEDICINE
Payer: COMMERCIAL

## 2019-02-20 VITALS
SYSTOLIC BLOOD PRESSURE: 112 MMHG | DIASTOLIC BLOOD PRESSURE: 71 MMHG | HEART RATE: 93 BPM | WEIGHT: 151 LBS | BODY MASS INDEX: 24.37 KG/M2

## 2019-02-20 VITALS
DIASTOLIC BLOOD PRESSURE: 71 MMHG | BODY MASS INDEX: 24.27 KG/M2 | SYSTOLIC BLOOD PRESSURE: 112 MMHG | WEIGHT: 151 LBS | HEIGHT: 66 IN | HEART RATE: 93 BPM

## 2019-02-20 DIAGNOSIS — O99.019 ANTEPARTUM ANEMIA: Primary | ICD-10-CM

## 2019-02-20 DIAGNOSIS — Z87.59 HISTORY OF GESTATIONAL HYPERTENSION: ICD-10-CM

## 2019-02-20 DIAGNOSIS — Z87.59 HISTORY OF GESTATIONAL HYPERTENSION: Primary | ICD-10-CM

## 2019-02-20 DIAGNOSIS — Z34.80 PRENATAL CARE, SUBSEQUENT PREGNANCY, UNSPECIFIED TRIMESTER: Primary | ICD-10-CM

## 2019-02-20 LAB
DEPRECATED CALCIDIOL+CALCIFEROL SERPL-MC: 35 UG/L (ref 20–75)
ERYTHROCYTE [DISTWIDTH] IN BLOOD BY AUTOMATED COUNT: 12.8 % (ref 10–15)
GLUCOSE 1H P 50 G GLC PO SERPL-MCNC: 98 MG/DL (ref 60–129)
HCT VFR BLD AUTO: 32.4 % (ref 35–47)
HGB BLD-MCNC: 10.7 G/DL (ref 11.7–15.7)
MCH RBC QN AUTO: 30.7 PG (ref 26.5–33)
MCHC RBC AUTO-ENTMCNC: 33 G/DL (ref 31.5–36.5)
MCV RBC AUTO: 93 FL (ref 78–100)
PLATELET # BLD AUTO: 167 10E9/L (ref 150–450)
RBC # BLD AUTO: 3.49 10E12/L (ref 3.8–5.2)
T PALLIDUM AB SER QL: NONREACTIVE
WBC # BLD AUTO: 9.7 10E9/L (ref 4–11)

## 2019-02-20 PROCEDURE — 90715 TDAP VACCINE 7 YRS/> IM: CPT | Mod: ZF

## 2019-02-20 PROCEDURE — G0463 HOSPITAL OUTPT CLINIC VISIT: HCPCS | Mod: 27,ZF

## 2019-02-20 PROCEDURE — 90471 IMMUNIZATION ADMIN: CPT | Mod: ZF

## 2019-02-20 PROCEDURE — 25000128 H RX IP 250 OP 636: Mod: ZF

## 2019-02-20 PROCEDURE — 82950 GLUCOSE TEST: CPT | Performed by: ADVANCED PRACTICE MIDWIFE

## 2019-02-20 PROCEDURE — 0064U ANTB TP TOTAL&RPR IA QUAL: CPT | Performed by: ADVANCED PRACTICE MIDWIFE

## 2019-02-20 PROCEDURE — 82306 VITAMIN D 25 HYDROXY: CPT | Performed by: ADVANCED PRACTICE MIDWIFE

## 2019-02-20 PROCEDURE — G0463 HOSPITAL OUTPT CLINIC VISIT: HCPCS | Mod: ZF

## 2019-02-20 PROCEDURE — 36415 COLL VENOUS BLD VENIPUNCTURE: CPT | Performed by: ADVANCED PRACTICE MIDWIFE

## 2019-02-20 PROCEDURE — 85027 COMPLETE CBC AUTOMATED: CPT | Performed by: ADVANCED PRACTICE MIDWIFE

## 2019-02-20 RX ORDER — LANOLIN ALCOHOL/MO/W.PET/CERES
1000 CREAM (GRAM) TOPICAL DAILY
Qty: 90 TABLET | Refills: 3 | Status: SHIPPED | OUTPATIENT
Start: 2019-02-20 | End: 2019-07-17

## 2019-02-20 RX ORDER — PNV NO.95/FERROUS FUM/FOLIC AC 28MG-0.8MG
1 TABLET ORAL DAILY
Qty: 60 TABLET | Refills: 3 | Status: SHIPPED | OUTPATIENT
Start: 2019-02-20 | End: 2019-07-17

## 2019-02-20 RX ORDER — MULTIVIT WITH MINERALS/LUTEIN
250 TABLET ORAL DAILY
Qty: 90 TABLET | Refills: 3 | Status: SHIPPED | OUTPATIENT
Start: 2019-02-20 | End: 2019-07-17

## 2019-02-20 ASSESSMENT — ENCOUNTER SYMPTOMS
PANIC: 0
LEG PAIN: 0
FEVER: 0
DEPRESSION: 0
DECREASED CONCENTRATION: 0
INSOMNIA: 0
COUGH: 0
FATIGUE: 0
CHILLS: 0
DYSPNEA ON EXERTION: 0
POLYPHAGIA: 0
ALTERED TEMPERATURE REGULATION: 0
SINUS CONGESTION: 0
POLYDIPSIA: 0
NERVOUS/ANXIOUS: 0
HEADACHES: 1

## 2019-02-20 ASSESSMENT — ANXIETY QUESTIONNAIRES
GAD7 TOTAL SCORE: 3
IF YOU CHECKED OFF ANY PROBLEMS ON THIS QUESTIONNAIRE, HOW DIFFICULT HAVE THESE PROBLEMS MADE IT FOR YOU TO DO YOUR WORK, TAKE CARE OF THINGS AT HOME, OR GET ALONG WITH OTHER PEOPLE: NOT DIFFICULT AT ALL
2. NOT BEING ABLE TO STOP OR CONTROL WORRYING: NOT AT ALL
6. BECOMING EASILY ANNOYED OR IRRITABLE: SEVERAL DAYS
5. BEING SO RESTLESS THAT IT IS HARD TO SIT STILL: NOT AT ALL
3. WORRYING TOO MUCH ABOUT DIFFERENT THINGS: NOT AT ALL
7. FEELING AFRAID AS IF SOMETHING AWFUL MIGHT HAPPEN: NOT AT ALL
1. FEELING NERVOUS, ANXIOUS, OR ON EDGE: SEVERAL DAYS

## 2019-02-20 ASSESSMENT — PATIENT HEALTH QUESTIONNAIRE - PHQ9
SUM OF ALL RESPONSES TO PHQ QUESTIONS 1-9: 1
5. POOR APPETITE OR OVEREATING: SEVERAL DAYS

## 2019-02-20 ASSESSMENT — MIFFLIN-ST. JEOR: SCORE: 1421.68

## 2019-02-20 ASSESSMENT — PAIN SCALES - GENERAL: PAINLEVEL: NO PAIN (0)

## 2019-02-20 NOTE — PROGRESS NOTES
28 year old, , 27w0d presents for EOB with her .   The patient presents with the following concerns: none reported   Questions regarding if there are integrative health options while on L&D, such as acupuncture and massage.     PHQ-9 SCORE 2018   PHQ-9 Total Score 1 2 1     Education completed today includes breast feeding, [facility] hand out , contraception, counting movements, signs of pre-term labor, when to present to birthplace, post partum depression, GBS and getting enough iron.  Birth preferences reviewed: Likely epidural (had good relief first delivery). Wants to remain open to all options  Labor support:     [place] Feeding plans : Breastfeed. Reports no issues with first baby.     Contraception planned:  Unsure. Prefers non hormonal method  The following labs were ordered today:       GCT, CBC w platelets, Vitamin D and Anti-treponema  Water birth consent form was not given. Pt is not interested.   Blood type:   ABO   Date Value Ref Range Status   10/15/2018 A  Final     RH(D)   Date Value Ref Range Status   10/15/2018 Pos  Final     Antibody Screen   Date Value Ref Range Status   10/15/2018 Neg  Final   Rhogam  was not given.  TDAP  was not given - plan after 27 weeks .  A/P:  Encounter Diagnoses   Name Primary?     [facility] CNM  Yes     History of gestational hypertension      Orders Placed This Encounter   Procedures     Glucose tolerance gest screen 1 hour [[id number]]     25- [place]-Vitamin D     CBC with platelets     Treponema Abs w Reflex to RPR and Titer [[id number]]     Continue scheduled prenatal care. Plans to RTC in 4 weeks, sooner PRN.     I, Maya Baca, am serving as a scribe; to document services personally performed by  Maura Gates CNM based on data collection and the provider's statements to me.     GILBERT Khan      The encounter was performed by me and scribed by the SNM. The scribed note accurately reflects my personal services and decisions  made by me.   Maura ANAYA CNM

## 2019-02-20 NOTE — LETTER
2/20/2019       RE: Lindsey Giles  [address]  [place] [address]     Dear Colleague,    Thank you for referring your patient, Lindsey Giles, [facility] Please see a copy of my visit note below.    HPI  Patient is here for follow up on hypertension. She reports that she has been feeling well. She denies headaches, vision changes, lower extremity edema. She has been taking aspirin as prescribed.     Review of Systems     Constitutional:  Negative for fever, chills and fatigue.   HENT:  Negative for dry mouth and sinus congestion.    Respiratory:   Negative for cough and dyspnea on exertion.    Cardiovascular:  Negative for chest pain, dyspnea on exertion, leg pain and edema.   Skin:  Negative for itching and rash.   Neurological:  Positive for headaches.   Psychiatric/Behavioral:  Negative for depression, decreased concentration, mood swings and panic attacks.    Endocrine:  Negative for altered temperature regulation, polyphagia, polydipsia, unwanted hair growth and change in facial hair.        Current Outpatient Medications   Medication     aspirin (ASA) 81 MG EC tablet     cholecalciferol 2000 units CAPS     penicillin V (VEETID) 500 MG tablet     Prenatal Multivit-Min-Fe-FA (PRENATAL VITAMINS) 0.8 MG TABS     No current facility-administered medications for this visit.      Vitals:    02/20/19 0859 02/20/19 0900 02/20/19 0901   BP: 122/73 113/71 112/71   Pulse: 93 93 93   Weight: 68.5 kg (151 lb)         Physical Exam   Constitutional: She appears well-developed and well-nourished.   HENT:   Head: Normocephalic and atraumatic.   Mouth/Throat: Oropharynx is clear and moist.   Eyes: EOM are normal. Pupils are equal, round, and reactive to light.   Cardiovascular: Normal rate, regular rhythm and normal heart sounds.   Pulmonary/Chest: Effort normal and breath sounds normal.   Musculoskeletal: Normal range of motion. She  exhibits no edema.   Skin: Skin is warm and dry.   Psychiatric: She has a normal mood and affect. Her behavior is normal. Judgment and thought content normal.   Vitals reviewed.    Assessment and Plan:  Lindsey was seen today for recheck.    Diagnoses and all orders for this visit:    History of gestational hypertension. Blood pressure is within acceptable range. Patient reports no symptoms concerning for preeclampsia. Recommend to continue with monitoring and aspirin therapy.      Total time spent 15 minutes.  More than 50% of the time spent with Ms. Giles on counseling / coordinating her care    Cami Cordoba MD

## 2019-02-20 NOTE — LETTER
2019       RE: Lindsey Giles   [address]  [place] [address]     Dear Colleague,    Thank you for referring your patient, Lindsey Giles, to the [facility] at [facility]. Please see a copy of my visit note below.       28 year old, , 27w0d presents for EOB with her .   The patient presents with the following concerns: none reported   Questions regarding if there are integrative health options while on L&D, such as acupuncture and massage.     PHQ-9 SCORE 2018   PHQ-9 Total Score 1 2 1     Education completed today includes breast feeding, [facility] hand out , contraception, counting movements, signs of pre-term labor, when to present to birthplace, post partum depression, GBS and getting enough iron.  Birth preferences reviewed: Likely epidural (had good relief first delivery). Wants to remain open to all options  Labor support:     [place] Feeding plans : Breastfeed. Reports no issues with first baby.     Contraception planned:  Unsure. Prefers non hormonal method  The following labs were ordered today:       GCT, CBC w platelets, Vitamin D and Anti-treponema  Water birth consent form was not given. Pt is not interested.   Blood type:   ABO   Date Value Ref Range Status   10/15/2018 A  Final     RH(D)   Date Value Ref Range Status   10/15/2018 Pos  Final     Antibody Screen   Date Value Ref Range Status   10/15/2018 Neg  Final   Rhogam  was not given.  TDAP  was not given - plan after 27 weeks .  A/P:  Encounter Diagnoses   Name Primary?     [facility] CNM  Yes     History of gestational hypertension      Orders Placed This Encounter   Procedures     Glucose tolerance gest screen 1 hour [[id number]]     25- OH-Vitamin D     CBC with platelets     Treponema Abs w Reflex to RPR and Titer [[id number]]     Continue scheduled prenatal care. Plans to RTC in 4 weeks, sooner PRN.     PERLA, Maya Baca, am serving as a  scribe; to document services personally performed by  Maura Gates CNM based on data collection and the provider's statements to me.     GILBERT Khan      The encounter was performed by me and scribed by the SNM. The scribed note accurately reflects my personal services and decisions made by me.   Maura ANAYA CNM

## 2019-02-20 NOTE — TELEPHONE ENCOUNTER
Attempted to call patient and leave voicemail to call back regarding labs but voicemail full.      [employer] message sent to start Iron, Vitamin B12, Vitamin C daily and recheck iron level in 4 weeks.  Maura ANAYA CNM

## 2019-02-20 NOTE — NURSING NOTE
Chief Complaint   Patient presents with     RECHECK     OB 27 weeks  B/P check   Vandana Davis LPN

## 2019-02-20 NOTE — PROGRESS NOTES
HPI  Patient is here for follow up on hypertension. She reports that she has been feeling well. She denies headaches, vision changes, lower extremity edema. She has been taking aspirin as prescribed.     Review of Systems     Constitutional:  Negative for fever, chills and fatigue.   HENT:  Negative for dry mouth and sinus congestion.    Respiratory:   Negative for cough and dyspnea on exertion.    Cardiovascular:  Negative for chest pain, dyspnea on exertion, leg pain and edema.   Skin:  Negative for itching and rash.   Neurological:  Positive for headaches.   Psychiatric/Behavioral:  Negative for depression, decreased concentration, mood swings and panic attacks.    Endocrine:  Negative for altered temperature regulation, polyphagia, polydipsia, unwanted hair growth and change in facial hair.        Current Outpatient Medications   Medication     aspirin (ASA) 81 MG EC tablet     cholecalciferol 2000 units CAPS     penicillin V (VEETID) 500 MG tablet     Prenatal Multivit-Min-Fe-FA (PRENATAL VITAMINS) 0.8 MG TABS     No current facility-administered medications for this visit.      Vitals:    02/20/19 0859 02/20/19 0900 02/20/19 0901   BP: 122/73 113/71 112/71   Pulse: 93 93 93   Weight: 68.5 kg (151 lb)         Physical Exam   Constitutional: She appears well-developed and well-nourished.   HENT:   Head: Normocephalic and atraumatic.   Mouth/Throat: Oropharynx is clear and moist.   Eyes: EOM are normal. Pupils are equal, round, and reactive to light.   Cardiovascular: Normal rate, regular rhythm and normal heart sounds.   Pulmonary/Chest: Effort normal and breath sounds normal.   Musculoskeletal: Normal range of motion. She exhibits no edema.   Skin: Skin is warm and dry.   Psychiatric: She has a normal mood and affect. Her behavior is normal. Judgment and thought content normal.   Vitals reviewed.    Assessment and Plan:  Lindsey was seen today for recheck.    Diagnoses and all orders for this visit:    History  of gestational hypertension. Blood pressure is within acceptable range. Patient reports no symptoms concerning for preeclampsia. Recommend to continue with monitoring and aspirin therapy.      Total time spent 15 minutes.  More than 50% of the time spent with Ms. Giles on counseling / coordinating her care    Cami Cordoba MD

## 2019-02-21 ASSESSMENT — ANXIETY QUESTIONNAIRES: GAD7 TOTAL SCORE: 3

## 2019-03-20 ENCOUNTER — OFFICE VISIT (OUTPATIENT)
Dept: INTERNAL MEDICINE | Facility: CLINIC | Age: 29
End: 2019-03-20
Attending: ADVANCED PRACTICE MIDWIFE
Payer: COMMERCIAL

## 2019-03-20 ENCOUNTER — OFFICE VISIT (OUTPATIENT)
Dept: OBGYN | Facility: CLINIC | Age: 29
End: 2019-03-20
Attending: ADVANCED PRACTICE MIDWIFE
Payer: COMMERCIAL

## 2019-03-20 VITALS
DIASTOLIC BLOOD PRESSURE: 66 MMHG | HEART RATE: 75 BPM | WEIGHT: 155 LBS | HEIGHT: 66 IN | SYSTOLIC BLOOD PRESSURE: 106 MMHG | BODY MASS INDEX: 24.91 KG/M2

## 2019-03-20 VITALS
SYSTOLIC BLOOD PRESSURE: 106 MMHG | BODY MASS INDEX: 25.02 KG/M2 | DIASTOLIC BLOOD PRESSURE: 66 MMHG | WEIGHT: 155 LBS | HEART RATE: 75 BPM

## 2019-03-20 DIAGNOSIS — Z87.59 HISTORY OF GESTATIONAL HYPERTENSION: Primary | ICD-10-CM

## 2019-03-20 DIAGNOSIS — D50.9 IRON DEFICIENCY ANEMIA, UNSPECIFIED IRON DEFICIENCY ANEMIA TYPE: ICD-10-CM

## 2019-03-20 DIAGNOSIS — O09.93 HRP (HIGH RISK PREGNANCY), THIRD TRIMESTER: Primary | ICD-10-CM

## 2019-03-20 LAB
ERYTHROCYTE [DISTWIDTH] IN BLOOD BY AUTOMATED COUNT: 13.3 % (ref 10–15)
HCT VFR BLD AUTO: 32.2 % (ref 35–47)
HGB BLD-MCNC: 10.7 G/DL (ref 11.7–15.7)
MCH RBC QN AUTO: 31 PG (ref 26.5–33)
MCHC RBC AUTO-ENTMCNC: 33.2 G/DL (ref 31.5–36.5)
MCV RBC AUTO: 93 FL (ref 78–100)
PLATELET # BLD AUTO: 150 10E9/L (ref 150–450)
RBC # BLD AUTO: 3.45 10E12/L (ref 3.8–5.2)
WBC # BLD AUTO: 8.9 10E9/L (ref 4–11)

## 2019-03-20 PROCEDURE — G0463 HOSPITAL OUTPT CLINIC VISIT: HCPCS | Mod: ZF

## 2019-03-20 PROCEDURE — G0463 HOSPITAL OUTPT CLINIC VISIT: HCPCS | Mod: ZF,27

## 2019-03-20 PROCEDURE — 36415 COLL VENOUS BLD VENIPUNCTURE: CPT | Performed by: ADVANCED PRACTICE MIDWIFE

## 2019-03-20 PROCEDURE — 85027 COMPLETE CBC AUTOMATED: CPT | Performed by: ADVANCED PRACTICE MIDWIFE

## 2019-03-20 ASSESSMENT — ANXIETY QUESTIONNAIRES
5. BEING SO RESTLESS THAT IT IS HARD TO SIT STILL: NOT AT ALL
7. FEELING AFRAID AS IF SOMETHING AWFUL MIGHT HAPPEN: NOT AT ALL
3. WORRYING TOO MUCH ABOUT DIFFERENT THINGS: NOT AT ALL
GAD7 TOTAL SCORE: 2
6. BECOMING EASILY ANNOYED OR IRRITABLE: SEVERAL DAYS
2. NOT BEING ABLE TO STOP OR CONTROL WORRYING: NOT AT ALL
1. FEELING NERVOUS, ANXIOUS, OR ON EDGE: NOT AT ALL

## 2019-03-20 ASSESSMENT — PATIENT HEALTH QUESTIONNAIRE - PHQ9
SUM OF ALL RESPONSES TO PHQ QUESTIONS 1-9: 2
5. POOR APPETITE OR OVEREATING: SEVERAL DAYS

## 2019-03-20 ASSESSMENT — MIFFLIN-ST. JEOR: SCORE: 1439.83

## 2019-03-20 ASSESSMENT — PAIN SCALES - GENERAL: PAINLEVEL: NO PAIN (0)

## 2019-03-20 NOTE — LETTER
"3/20/2019       RE: Lindsey Giles   [address]  [place] [address]     Dear Colleague,    Thank you for referring your patient, Lindsey Giles, to the [facility] at [facility]. Please see a copy of my visit note below.    Subjective:      28 year old  at 31w0d presentst for a routine prenatal appointment.    Denies vaginal bleeding or leakage of fluid.  Denies contractions. Reports regular fetal movement.       No HA, visual changes, RUQ or epigastric pain.   Patient concerns: None today.  Met with Dr. Cordoba today   Feeling well overall.  Reviewed EOB labs with patient. Patient has been taking iron supplements, agrees to CBC today    Objective:  Vitals:    19 0945   BP: 106/66   BP Location: Left arm   Patient Position: Chair   Pulse: 75   Weight: 70.3 kg (155 lb)   Height: 1.676 m (5' 6\")   , see ob flowsheet  Assessment/Plan     Encounter Diagnoses   Name Primary?     HRP (high risk pregnancy), third trimester Yes     Iron deficiency anemia, unspecified iron deficiency anemia type      Orders Placed This Encounter   Procedures     CBC with platelets         - Reviewed total weight gain, encouraged continued healthy diet and exercise.    Reviewed importance of daily fetal kick count and why/how to contact provider.    - Reviewed why/how to contact provider if headache/visual changes/RUQ or epigastric pain, decreased fetal movement, vaginal bleeding, leakage of fluid or more than 4 contractions in an hour.     Patient education/orders or handouts today:  PTL signs/symptoms and Hospital tour      Return to clinic in 2 weeks and prn if questions or concerns.     HÉCTOR Wiggins CNM      Again, thank you for allowing me to participate in the care of your patient.      Sincerely,    HÉCTOR Wiggins CNM      "

## 2019-03-20 NOTE — LETTER
RE: Lindsey Giles  [address] [place] [address]     Dear Colleague,    Thank you for referring your patient, Lindsey Giles, to the [facility]  at [facility]. Please see a copy of my visit note below.    HPI  Patient is here for follow up on blood pressure in pregnancy. She reports that she has been feeling well. She denies headaches, lower extremity edema, or vision changes.     Current Outpatient Medications   Medication     aspirin (ASA) 81 MG EC tablet     cholecalciferol 2000 units CAPS     Ferrous Sulfate (IRON) 325 (65 Fe) MG tablet     penicillin V (VEETID) 500 MG tablet     Prenatal Multivit-Min-Fe-FA (PRENATAL VITAMINS) 0.8 MG TABS     vitamin B-12 (CYANOCOBALAMIN) 1000 MCG tablet     vitamin C (ASCORBIC ACID) 250 MG tablet     No current facility-administered medications for this visit.      Vitals:    03/20/19 0929 03/20/19 0930 03/20/19 0931 03/20/19 0932   BP: 104/67 116/67 98/63 106/66   Pulse: 75 75 75 75   Weight: 70.3 kg (155 lb)          Physical Exam   Constitutional: She is oriented to person, place, and time. She appears well-developed and well-nourished.   HENT:   Head: Normocephalic and atraumatic.   Eyes: EOM are normal. Pupils are equal, round, and reactive to light.   Cardiovascular: Normal rate and regular rhythm.   Pulmonary/Chest: Effort normal.   Musculoskeletal: She exhibits no edema.   Neurological: She is alert and oriented to person, place, and time.   Skin: Skin is warm and dry.   Psychiatric: She has a normal mood and affect. Her behavior is normal. Judgment and thought content normal.   Nursing note and vitals reviewed.      Assessment and Plan:  Lindsey was seen today for recheck.    Diagnoses and all orders for this visit:    History of gestational hypertension. Blood pressure is within acceptable range. Patient denies signs or symptoms of preeclampsia. Recommend to continue with current management plan.  Will follow up in 1 month. Patient is in agreement. Signs and symptoms of preeclampsia were reviewed today.     Total time spent 15 minutes.  More than 50% of the time spent with Ms. Giles on counseling / coordinating her care    Cami Cordoba MD

## 2019-03-20 NOTE — PROGRESS NOTES
HPI  Patient is here for follow up on blood pressure in pregnancy. She reports that she has been feeling well. She denies headaches, lower extremity edema, or vision changes.     Review of Systems     Constitutional:  Negative for fever, chills and fatigue.   Eyes:  Negative for decreased vision.   Respiratory:   Negative for cough and dyspnea on exertion.    Cardiovascular:  Negative for chest pain, dyspnea on exertion and edema.   Gastrointestinal:  Negative for nausea, vomiting, abdominal pain, constipation and melena.   Skin:  Negative for itching and rash.   Endo/Heme:  Negative for anemia, swollen glands and bruises/bleeds easily.   Psychiatric/Behavioral:  Negative for depression, decreased concentration, mood swings and panic attacks.    Endocrine:  Negative for altered temperature regulation, polyphagia, polydipsia, unwanted hair growth and change in facial hair.    Current Outpatient Medications   Medication     aspirin (ASA) 81 MG EC tablet     cholecalciferol 2000 units CAPS     Ferrous Sulfate (IRON) 325 (65 Fe) MG tablet     penicillin V (VEETID) 500 MG tablet     Prenatal Multivit-Min-Fe-FA (PRENATAL VITAMINS) 0.8 MG TABS     vitamin B-12 (CYANOCOBALAMIN) 1000 MCG tablet     vitamin C (ASCORBIC ACID) 250 MG tablet     No current facility-administered medications for this visit.      Vitals:    03/20/19 0929 03/20/19 0930 03/20/19 0931 03/20/19 0932   BP: 104/67 116/67 98/63 106/66   Pulse: 75 75 75 75   Weight: 70.3 kg (155 lb)          Physical Exam   Constitutional: She is oriented to person, place, and time. She appears well-developed and well-nourished.   HENT:   Head: Normocephalic and atraumatic.   Eyes: EOM are normal. Pupils are equal, round, and reactive to light.   Cardiovascular: Normal rate and regular rhythm.   Pulmonary/Chest: Effort normal.   Musculoskeletal: She exhibits no edema.   Neurological: She is alert and oriented to person, place, and time.   Skin: Skin is warm and dry.    Psychiatric: She has a normal mood and affect. Her behavior is normal. Judgment and thought content normal.   Nursing note and vitals reviewed.      Assessment and Plan:  Lindsey was seen today for recheck.    Diagnoses and all orders for this visit:    History of gestational hypertension. Blood pressure is within acceptable range. Patient denies signs or symptoms of preeclampsia. Recommend to continue with current management plan. Will follow up in 1 month. Patient is in agreement. Signs and symptoms of preeclampsia were reviewed today.     Total time spent 15 minutes.  More than 50% of the time spent with Ms. Giles on counseling / coordinating her care    Cami Cordoba MD

## 2019-03-20 NOTE — PROGRESS NOTES
"Subjective:      28 year old  at 31w0d presentst for a routine prenatal appointment.    Denies vaginal bleeding or leakage of fluid.  Denies contractions. Reports regular fetal movement.       No HA, visual changes, RUQ or epigastric pain.   Patient concerns: None today.  Met with Dr. Cordoba today   Feeling well overall.  Reviewed EOB labs with patient. Patient has been taking iron supplements, agrees to CBC today    Objective:  Vitals:    19 0945   BP: 106/66   BP Location: Left arm   Patient Position: Chair   Pulse: 75   Weight: 70.3 kg (155 lb)   Height: 1.676 m (5' 6\")   , see ob flowsheet  Assessment/Plan     Encounter Diagnoses   Name Primary?     HRP (high risk pregnancy), third trimester Yes     Iron deficiency anemia, unspecified iron deficiency anemia type      Orders Placed This Encounter   Procedures     CBC with platelets         - Reviewed total weight gain, encouraged continued healthy diet and exercise.    Reviewed importance of daily fetal kick count and why/how to contact provider.    - Reviewed why/how to contact provider if headache/visual changes/RUQ or epigastric pain, decreased fetal movement, vaginal bleeding, leakage of fluid or more than 4 contractions in an hour.     Patient education/orders or handouts today:  PTL signs/symptoms and Hospital tour      Return to clinic in 2 weeks and prn if questions or concerns.     HÉCTOR Wiggins CNM    "

## 2019-03-21 ASSESSMENT — ANXIETY QUESTIONNAIRES: GAD7 TOTAL SCORE: 2

## 2019-03-24 ASSESSMENT — ENCOUNTER SYMPTOMS
INSOMNIA: 0
POLYDIPSIA: 0
POLYPHAGIA: 0
ABDOMINAL PAIN: 0
COUGH: 0
DECREASED CONCENTRATION: 0
CONSTIPATION: 0
NERVOUS/ANXIOUS: 0
ALTERED TEMPERATURE REGULATION: 0
FEVER: 0
DEPRESSION: 0
CHILLS: 0
VOMITING: 0
SWOLLEN GLANDS: 0
FATIGUE: 0
NAUSEA: 0
PANIC: 0
BRUISES/BLEEDS EASILY: 0
DYSPNEA ON EXERTION: 0

## 2019-04-03 ENCOUNTER — OFFICE VISIT (OUTPATIENT)
Dept: OBGYN | Facility: CLINIC | Age: 29
End: 2019-04-03
Attending: ADVANCED PRACTICE MIDWIFE
Payer: COMMERCIAL

## 2019-04-03 VITALS
SYSTOLIC BLOOD PRESSURE: 104 MMHG | HEIGHT: 66 IN | HEART RATE: 78 BPM | BODY MASS INDEX: 25.3 KG/M2 | DIASTOLIC BLOOD PRESSURE: 69 MMHG | WEIGHT: 157.4 LBS

## 2019-04-03 DIAGNOSIS — Z87.59 HISTORY OF GESTATIONAL HYPERTENSION: ICD-10-CM

## 2019-04-03 DIAGNOSIS — Z34.80 PRENATAL CARE, SUBSEQUENT PREGNANCY, UNSPECIFIED TRIMESTER: Primary | ICD-10-CM

## 2019-04-03 DIAGNOSIS — O99.019 ANTEPARTUM ANEMIA: ICD-10-CM

## 2019-04-03 PROCEDURE — G0463 HOSPITAL OUTPT CLINIC VISIT: HCPCS | Mod: ZF

## 2019-04-03 ASSESSMENT — MIFFLIN-ST. JEOR: SCORE: 1450.71

## 2019-04-03 ASSESSMENT — PAIN SCALES - GENERAL: PAINLEVEL: NO PAIN (0)

## 2019-04-03 NOTE — LETTER
"4/3/2019       RE: Lindsey Giles   [address]  [place] [address]     Dear Colleague,    Thank you for referring your patient, Lindsey Giles, to the [facility] at [facility]. Please see a copy of my visit note below.    Subjective:      28 year old  at 33w0d presents for a routine prenatal appointment.    Denies vaginal bleeding or leakage of fluid.  Mild tightening, no contractions. Good fetal movement.       No HA, visual changes, RUQ or epigastric pain.   Patient concerns: Feeling well overall.  - Mild lower back pain, especially on active, busy days  - Questions about ways to decrease risk/severity of perineal tears    Reviewed TDAP Previously given 19    Objective:  Vitals:    19 1027   BP: 104/69   Pulse: 78   Weight: 71.4 kg (157 lb 6.4 oz)   Height: 1.676 m (5' 6\")    see ob flowsheet    Assessment/Plan     Encounter Diagnoses   Name Primary?     [facility] CNM  Yes     History of gestational hypertension      Antepartum anemia - PO iron/B12/C then rehceck ~4 weeks (~31 weeks)        ABO   Date Value Ref Range Status   10/15/2018 A  Final     RH(D)   Date Value Ref Range Status   10/15/2018 Pos  Final     Antibody Screen   Date Value Ref Range Status   10/15/2018 Neg  Final   Rhogam  was not given.    - Oblique fetus. Discussed normal at this point and will verify presentation at next visit.   - Discussed perineal massage 3 days a week.   - Discussed maternity belt for lower back pain. Will consider purchasing from store. Discussed Rx option if needed.   - Discussed continuing PO supplement v IV venofer. Reviewed iron rich diet. Pt would like to continue PO iron and increase dietary intake. If Hg remains <11 at 36 weeks, she would consider IV venfer.  - Reviewed total weight gain, encouraged continued healthy diet and exercise.  Reviewed importance of daily fetal kick count and why/how to contact provider.  - Reviewed why/how to " contact provider if headache/visual changes/RUQ or epigastric pain, decreased fetal movement, vaginal bleeding, leakage of fluid or more than 4 contractions in an hour.     -Patient education/orders or handouts today:  PTL signs/symptoms     -Reviewed GBS screening at 35-36 wks.    Return to clinic in 3 weeks and prn if questions or concerns.     I, Maya Baca, am serving as a scribe; to document services personally performed by  Janna Jensen CNM based on data collection and the provider's statements to me.     GILBERT Khan    I agree with the note completed by the [employer] Student, except for changes made by me. The encounter was performed by me and scribed by the student. The scribed note accurately reflects my personal services and decisions made by me.    HÉCTOR Escamilla CNM

## 2019-04-03 NOTE — PROGRESS NOTES
"Subjective:      28 year old  at 33w0d presents for a routine prenatal appointment.    Denies vaginal bleeding or leakage of fluid.  Mild tightening, no contractions. Good fetal movement.       No HA, visual changes, RUQ or epigastric pain.   Patient concerns: Feeling well overall.  - Mild lower back pain, especially on active, busy days  - Questions about ways to decrease risk/severity of perineal tears    Reviewed TDAP Previously given 19    Objective:  Vitals:    19 1027   BP: 104/69   Pulse: 78   Weight: 71.4 kg (157 lb 6.4 oz)   Height: 1.676 m (5' 6\")    see ob flowsheet    Assessment/Plan     Encounter Diagnoses   Name Primary?     [facility] CNM  Yes     History of gestational hypertension      Antepartum anemia - PO iron/B12/C then rehceck ~4 weeks (~31 weeks)        ABO   Date Value Ref Range Status   10/15/2018 A  Final     RH(D)   Date Value Ref Range Status   10/15/2018 Pos  Final     Antibody Screen   Date Value Ref Range Status   10/15/2018 Neg  Final   Rhogam  was not given.    - Oblique fetus. Discussed normal at this point and will verify presentation at next visit.   - Discussed perineal massage 3 days a week.   - Discussed maternity belt for lower back pain. Will consider purchasing from store. Discussed Rx option if needed.   - Discussed continuing PO supplement v IV venofer. Reviewed iron rich diet. Pt would like to continue PO iron and increase dietary intake. If Hg remains <11 at 36 weeks, she would consider IV venfer.  - Reviewed total weight gain, encouraged continued healthy diet and exercise.  Reviewed importance of daily fetal kick count and why/how to contact provider.  - Reviewed why/how to contact provider if headache/visual changes/RUQ or epigastric pain, decreased fetal movement, vaginal bleeding, leakage of fluid or more than 4 contractions in an hour.     -Patient education/orders or handouts today:  PTL signs/symptoms     -Reviewed GBS screening at 35-36 " wks.    Return to clinic in 3 weeks and prn if questions or concerns.     I, Maya Baca, am serving as a scribe; to document services personally performed by  Janna Jensen CNM based on data collection and the provider's statements to me.     GILBERT Khan    I agree with the note completed by the MYRTLE Student, except for changes made by me. The encounter was performed by me and scribed by the student. The scribed note accurately reflects my personal services and decisions made by me.    Janna Jensen, HÉCTOR IRAHETA

## 2019-04-09 ENCOUNTER — TELEPHONE (OUTPATIENT)
Dept: OBGYN | Facility: CLINIC | Age: 29
End: 2019-04-09

## 2019-04-09 NOTE — TELEPHONE ENCOUNTER
Lindsey called to report new symptoms of right upper quadrant pain which began this morning. Pt being followed for hx of gestational HTN. Denies headache, vision disturbance, edema, nausea/vomiting. Pt states after getting up and ambulating pain has improved.     Discussed with Maura Gates who advised patient continue to watch symptoms. If develops any symptoms previously discussed, or if pain returns and does not resolve with position change should call us. Patient expressed understanding and agrees with plan.

## 2019-04-17 ENCOUNTER — OFFICE VISIT (OUTPATIENT)
Dept: INTERNAL MEDICINE | Facility: CLINIC | Age: 29
End: 2019-04-17
Attending: INTERNAL MEDICINE
Payer: COMMERCIAL

## 2019-04-17 VITALS
BODY MASS INDEX: 26.15 KG/M2 | DIASTOLIC BLOOD PRESSURE: 70 MMHG | SYSTOLIC BLOOD PRESSURE: 107 MMHG | WEIGHT: 162 LBS | HEART RATE: 82 BPM

## 2019-04-17 DIAGNOSIS — R80.1 PERSISTENT PROTEINURIA: Primary | ICD-10-CM

## 2019-04-17 PROBLEM — Z34.80 PRENATAL CARE, SUBSEQUENT PREGNANCY, UNSPECIFIED TRIMESTER: Status: ACTIVE | Noted: 2018-10-15

## 2019-04-17 LAB
ALBUMIN UR-MCNC: NEGATIVE MG/DL
APPEARANCE UR: CLEAR
BACTERIA #/AREA URNS HPF: ABNORMAL /HPF
BILIRUB UR QL STRIP: NEGATIVE
COLOR UR AUTO: ABNORMAL
GLUCOSE UR STRIP-MCNC: NEGATIVE MG/DL
HGB UR QL STRIP: NEGATIVE
KETONES UR STRIP-MCNC: NEGATIVE MG/DL
LEUKOCYTE ESTERASE UR QL STRIP: NEGATIVE
MUCOUS THREADS #/AREA URNS LPF: PRESENT /LPF
NITRATE UR QL: NEGATIVE
PH UR STRIP: 6.5 PH (ref 5–7)
PROT UR-MCNC: 0.08 G/L
PROT/CREAT 24H UR: 0.62 G/G CR (ref 0–0.2)
RBC #/AREA URNS AUTO: <1 /HPF (ref 0–2)
SOURCE: ABNORMAL
SP GR UR STRIP: 1 (ref 1–1.03)
SQUAMOUS #/AREA URNS AUTO: 1 /HPF (ref 0–1)
UROBILINOGEN UR STRIP-MCNC: NORMAL MG/DL (ref 0–2)
WBC #/AREA URNS AUTO: 1 /HPF (ref 0–5)

## 2019-04-17 PROCEDURE — 81001 URINALYSIS AUTO W/SCOPE: CPT | Performed by: INTERNAL MEDICINE

## 2019-04-17 PROCEDURE — 84156 ASSAY OF PROTEIN URINE: CPT | Performed by: INTERNAL MEDICINE

## 2019-04-17 PROCEDURE — G0463 HOSPITAL OUTPT CLINIC VISIT: HCPCS | Mod: ZF

## 2019-04-17 ASSESSMENT — PATIENT HEALTH QUESTIONNAIRE - PHQ9
5. POOR APPETITE OR OVEREATING: SEVERAL DAYS
SUM OF ALL RESPONSES TO PHQ QUESTIONS 1-9: 2

## 2019-04-17 ASSESSMENT — ANXIETY QUESTIONNAIRES
6. BECOMING EASILY ANNOYED OR IRRITABLE: NOT AT ALL
1. FEELING NERVOUS, ANXIOUS, OR ON EDGE: SEVERAL DAYS
GAD7 TOTAL SCORE: 3
2. NOT BEING ABLE TO STOP OR CONTROL WORRYING: NOT AT ALL
IF YOU CHECKED OFF ANY PROBLEMS ON THIS QUESTIONNAIRE, HOW DIFFICULT HAVE THESE PROBLEMS MADE IT FOR YOU TO DO YOUR WORK, TAKE CARE OF THINGS AT HOME, OR GET ALONG WITH OTHER PEOPLE: NOT DIFFICULT AT ALL
7. FEELING AFRAID AS IF SOMETHING AWFUL MIGHT HAPPEN: NOT AT ALL
5. BEING SO RESTLESS THAT IT IS HARD TO SIT STILL: NOT AT ALL
3. WORRYING TOO MUCH ABOUT DIFFERENT THINGS: SEVERAL DAYS

## 2019-04-17 ASSESSMENT — PAIN SCALES - GENERAL: PAINLEVEL: NO PAIN (0)

## 2019-04-17 NOTE — LETTER
RE: Lindsey Giles  [address] [place] [address]     Dear Colleague,    Thank you for referring your patient, Lindsey Giles, to the [facility]  at [facility]. Please see a copy of my visit note below.    HPI  Patient is here for follow up on elevated blood pressure and proteinuria in pregnancy. She reports that she has been feeling well. She denies new problems of symptoms other than mild lower extremity edema at the end of the day.     Current Outpatient Medications   Medication     aspirin (ASA) 81 MG EC tablet     cholecalciferol 2000 units CAPS     Ferrous Sulfate (IRON) 325 (65 Fe) MG tablet     Prenatal Multivit-Min-Fe-FA (PRENATAL VITAMINS) 0.8 MG TABS     vitamin B-12 (CYANOCOBALAMIN) 1000 MCG tablet     vitamin C (ASCORBIC ACID) 250 MG tablet     No current facility-administered medications for this visit.      Vitals:    04/17/19 0948 04/17/19 0959 04/17/19 1000 04/17/19 1001   BP: 108/68 109/72 106/69 107/70   Pulse: 82 82 82 82   Weight: 73.5 kg (162 lb)            Physical Exam   Musculoskeletal: She exhibits no edema.       Assessment and Plan:  Lindsey was seen today for recheck.    Diagnoses and all orders for this visit:    Persistent proteinuria. Reviewed signs and symptoms of preeclampsia with  patient. Recommend to continue with monitoring of symptoms as well as blood pressure. Patient will follow up after delivery for assessment of proteinuria.   -     Protein  random urine with Creat Ratio  -     Routine UA with micro reflex to culture      Total time spent 25 minutes.  More than 50% of the time spent with Ms. Giles on counseling / coordinating her care    Cami Cordoba MD

## 2019-04-17 NOTE — LETTER
4/25/2019         Lindsey Giles   [address]  [place] [address]        Dear Ms. Giles:    As expected, small amount of protein was seen in urine. Recommend follow up after delivery.     Results for orders placed or performed in visit on 04/17/19   Protein  random urine with Creat Ratio   Result Value Ref Range    Protein Random Urine 0.08 g/L    Protein Total Urine g/gr Creatinine 0.62 (H) 0 - 0.2 g/g Cr   Routine UA with micro reflex to culture   Result Value Ref Range    Color Urine Straw     Appearance Urine Clear     Glucose Urine Negative NEG^Negative mg/dL    Bilirubin Urine Negative NEG^Negative    Ketones Urine Negative NEG^Negative mg/dL    Specific Gravity Urine 1.003 1.003 - 1.035    Blood Urine Negative NEG^Negative    pH Urine 6.5 5.0 - 7.0 pH    Protein Albumin Urine Negative NEG^Negative mg/dL    Urobilinogen mg/dL Normal 0.0 - 2.0 mg/dL    Nitrite Urine Negative NEG^Negative    Leukocyte Esterase Urine Negative NEG^Negative    Source Unspecified Urine     WBC Urine 1 0 - 5 /HPF    RBC Urine <1 0 - 2 /HPF    Bacteria Urine Few (A) NEG^Negative /HPF    Squamous Epithelial /HPF Urine 1 0 - 1 /HPF    Mucous Urine Present (A) NEG^Negative /LPF         Please note that test explanations are brief and do not reflect all diagnostic uses.  If you have any questions or concerns, please call the clinic at [phone].      Sincerely,      Vandana Davis sent on behalf of  Cami Cordoba MD

## 2019-04-17 NOTE — PROGRESS NOTES
HPI  Patient is here for follow up on elevated blood pressure and proteinuria in pregnancy. She reports that she has been feeling well. She denies new problems of symptoms other than mild lower extremity edema at the end of the day.     Review of Systems     Constitutional:  Negative for fever, chills and fatigue.   HENT:  Negative for dry mouth.    Eyes:  Negative for decreased vision.   Respiratory:   Negative for cough, dyspnea on exertion and cough disturbing sleep.    Cardiovascular:  Positive for leg swelling. Negative for chest pain, dyspnea on exertion, leg pain and edema.   Gastrointestinal:  Negative for nausea, vomiting, abdominal pain, diarrhea, constipation and melena.   Skin:  Negative for itching and rash.   Neurological:  Negative for loss of consciousness and weakness.   Endo/Heme:  Negative for anemia, swollen glands and bruises/bleeds easily.   Psychiatric/Behavioral:  Negative for depression, decreased concentration, mood swings and panic attacks.    Endocrine:  Negative for altered temperature regulation, polyphagia, polydipsia, unwanted hair growth and change in facial hair.    Current Outpatient Medications   Medication     aspirin (ASA) 81 MG EC tablet     cholecalciferol 2000 units CAPS     Ferrous Sulfate (IRON) 325 (65 Fe) MG tablet     Prenatal Multivit-Min-Fe-FA (PRENATAL VITAMINS) 0.8 MG TABS     vitamin B-12 (CYANOCOBALAMIN) 1000 MCG tablet     vitamin C (ASCORBIC ACID) 250 MG tablet     No current facility-administered medications for this visit.      Vitals:    04/17/19 0948 04/17/19 0959 04/17/19 1000 04/17/19 1001   BP: 108/68 109/72 106/69 107/70   Pulse: 82 82 82 82   Weight: 73.5 kg (162 lb)            Physical Exam   Musculoskeletal: She exhibits no edema.       Assessment and Plan:  Lindsey was seen today for recheck.    Diagnoses and all orders for this visit:    Persistent proteinuria. Reviewed signs and symptoms of preeclampsia with  patient. Recommend to continue with  monitoring of symptoms as well as blood pressure. Patient will follow up after delivery for assessment of proteinuria.   -     Protein  random urine with Creat Ratio  -     Routine UA with micro reflex to culture      Total time spent 25 minutes.  More than 50% of the time spent with Ms. Giles on counseling / coordinating her care    Cami Cordoba MD

## 2019-04-17 NOTE — NURSING NOTE
Chief Complaint   Patient presents with     RECHECK     OB 35 weeks  B/P check   Vandana Davis LPN

## 2019-04-18 ASSESSMENT — ANXIETY QUESTIONNAIRES: GAD7 TOTAL SCORE: 3

## 2019-04-21 ASSESSMENT — ENCOUNTER SYMPTOMS
FEVER: 0
DYSPNEA ON EXERTION: 0
CHILLS: 0
NAUSEA: 0
FATIGUE: 0
BRUISES/BLEEDS EASILY: 0
WEAKNESS: 0
ALTERED TEMPERATURE REGULATION: 0
DIARRHEA: 0
LOSS OF CONSCIOUSNESS: 0
DEPRESSION: 0
ABDOMINAL PAIN: 0
POLYPHAGIA: 0
VOMITING: 0
LEG SWELLING: 1
COUGH DISTURBING SLEEP: 0
CONSTIPATION: 0
POLYDIPSIA: 0
SWOLLEN GLANDS: 0
NERVOUS/ANXIOUS: 0
DECREASED CONCENTRATION: 0
COUGH: 0
INSOMNIA: 0
LEG PAIN: 0
PANIC: 0

## 2019-04-24 ENCOUNTER — ANCILLARY PROCEDURE (OUTPATIENT)
Dept: ULTRASOUND IMAGING | Facility: CLINIC | Age: 29
End: 2019-04-24
Attending: ADVANCED PRACTICE MIDWIFE
Payer: COMMERCIAL

## 2019-04-24 ENCOUNTER — OFFICE VISIT (OUTPATIENT)
Dept: OBGYN | Facility: CLINIC | Age: 29
End: 2019-04-24
Attending: ADVANCED PRACTICE MIDWIFE
Payer: COMMERCIAL

## 2019-04-24 VITALS
WEIGHT: 164 LBS | DIASTOLIC BLOOD PRESSURE: 67 MMHG | BODY MASS INDEX: 26.36 KG/M2 | HEIGHT: 66 IN | SYSTOLIC BLOOD PRESSURE: 99 MMHG

## 2019-04-24 DIAGNOSIS — Z34.80 PRENATAL CARE, SUBSEQUENT PREGNANCY, UNSPECIFIED TRIMESTER: ICD-10-CM

## 2019-04-24 DIAGNOSIS — Z34.80 PRENATAL CARE, SUBSEQUENT PREGNANCY, UNSPECIFIED TRIMESTER: Primary | ICD-10-CM

## 2019-04-24 PROBLEM — O99.119 GESTATIONAL THROMBOCYTOPENIA (H): Status: ACTIVE | Noted: 2019-04-24

## 2019-04-24 PROBLEM — D69.6 GESTATIONAL THROMBOCYTOPENIA (H): Status: ACTIVE | Noted: 2019-04-24

## 2019-04-24 LAB
ERYTHROCYTE [DISTWIDTH] IN BLOOD BY AUTOMATED COUNT: 13.8 % (ref 10–15)
HCT VFR BLD AUTO: 35 % (ref 35–47)
HGB BLD-MCNC: 11.6 G/DL (ref 11.7–15.7)
MCH RBC QN AUTO: 31.7 PG (ref 26.5–33)
MCHC RBC AUTO-ENTMCNC: 33.1 G/DL (ref 31.5–36.5)
MCV RBC AUTO: 96 FL (ref 78–100)
PLATELET # BLD AUTO: 145 10E9/L (ref 150–450)
RBC # BLD AUTO: 3.66 10E12/L (ref 3.8–5.2)
WBC # BLD AUTO: 10.9 10E9/L (ref 4–11)

## 2019-04-24 PROCEDURE — 85027 COMPLETE CBC AUTOMATED: CPT | Performed by: ADVANCED PRACTICE MIDWIFE

## 2019-04-24 PROCEDURE — G0463 HOSPITAL OUTPT CLINIC VISIT: HCPCS | Mod: ZF

## 2019-04-24 PROCEDURE — 76815 OB US LIMITED FETUS(S): CPT

## 2019-04-24 PROCEDURE — 87653 STREP B DNA AMP PROBE: CPT | Performed by: ADVANCED PRACTICE MIDWIFE

## 2019-04-24 PROCEDURE — 36415 COLL VENOUS BLD VENIPUNCTURE: CPT | Performed by: ADVANCED PRACTICE MIDWIFE

## 2019-04-24 ASSESSMENT — ANXIETY QUESTIONNAIRES
2. NOT BEING ABLE TO STOP OR CONTROL WORRYING: NOT AT ALL
3. WORRYING TOO MUCH ABOUT DIFFERENT THINGS: SEVERAL DAYS
6. BECOMING EASILY ANNOYED OR IRRITABLE: SEVERAL DAYS
7. FEELING AFRAID AS IF SOMETHING AWFUL MIGHT HAPPEN: NOT AT ALL
GAD7 TOTAL SCORE: 3
1. FEELING NERVOUS, ANXIOUS, OR ON EDGE: NOT AT ALL
5. BEING SO RESTLESS THAT IT IS HARD TO SIT STILL: NOT AT ALL

## 2019-04-24 ASSESSMENT — PAIN SCALES - GENERAL: PAINLEVEL: NO PAIN (0)

## 2019-04-24 ASSESSMENT — PATIENT HEALTH QUESTIONNAIRE - PHQ9
SUM OF ALL RESPONSES TO PHQ QUESTIONS 1-9: 2
5. POOR APPETITE OR OVEREATING: SEVERAL DAYS

## 2019-04-24 ASSESSMENT — MIFFLIN-ST. JEOR: SCORE: 1475.65

## 2019-04-24 NOTE — PROGRESS NOTES
"Subjective:      29 year old  at 36w0d presents for a routine prenatal appointment.         Denies vaginal bleeding,  leakage of fluid, or change in vaginal discharge.  Denies contractions.  Endorses fetal movement.     No HA, visual changes, RUQ or epigastric pain.   Patient concerns:  Feeling well overall. Has not been having headaches, has been taking iron with supplements.     Objective:  Vitals:    19 0939   Weight: 74.4 kg (164 lb)   Height: 1.676 m (5' 6\")     See OB flowsheet    Fetal head noted in upper left quadrant. Able to do US for positioning that confirms breech, head in ULQ.    Assessment/Plan     Encounter Diagnosis   Name Primary?     [facility] MYRTLE  Yes       PHQ-9 SCORE 2019 3/20/2019 2019   PHQ-9 Total Score 1 2 2     PHQ-9 SCORE 2019 3/20/2019 2019   PHQ-9 Total Score 1 2 2     GBS screening: Obtained.   Birth preferences reviewed: Medicated and Un-Medicated open, breast.   Reviewed options for ECV, acupuncture and spinning babies. They will start with acupuncture and spinning babies. ECV scheduled for 37 weeks - next Wed, 19 at 0800. She will call at 7am and will remain NPO that morning.   Labor signs discussed. Reinforced daily fetal movement counts.  Reviewed why/how to contact provider if headache/visual changes/RUQ or epigastric pain, decreased fetal movement, vaginal bleeding, leakage of fluid.   Return to clinic in 1 week and prn if questions or concerns.     Luli Chung CNM  "

## 2019-04-24 NOTE — LETTER
"  RE: Lindsey Giles   [address] [place] [address]     Dear Colleague,    Thank you for referring your patient, Lindsey Giles, to the [facility] at [facility]. Please see a copy of my visit note below.    Created in error please disregard.      Subjective:      29 year old  at 36w0d presents for a routine prenatal appointment.         Denies vaginal bleeding,  leakage of fluid, or change in vaginal discharge.  Denies contractions.  Endorses fetal movement.     No HA, visual changes, RUQ or epigastric pain.   Patient concerns:  Feeling well overall. Has not been having headaches, has been taking iron with supplements.     Objective:  Vitals:    19 0939   Weight: 74.4 kg (164 lb)   Height: 1.676 m (5' 6\")     See OB flowsheet    Fetal head noted in upper left quadrant. Able to do US for positioning that confirms breech, head in ULQ.    Assessment/Plan     Encounter Diagnosis   Name Primary?     [facility] CNM  Yes       PHQ-9 SCORE 2019 3/20/2019 2019   PHQ-9 Total Score 1 2 2     PHQ-9 SCORE 2019 3/20/2019 2019   PHQ-9 Total Score 1 2 2     GBS screening: Obtained.   Birth preferences reviewed: Medicated and Un-Medicated open, breast.   Reviewed options for ECV, acupuncture and spinning babies. They will start with acupuncture and spinning babies. ECV scheduled for 37 weeks - next Wed, 19 at 0800. She will call at 7am and will remain NPO that morning.   Labor signs discussed. Reinforced daily fetal movement counts.  Reviewed why/how to contact provider if headache/visual changes/RUQ or epigastric pain, decreased fetal movement, vaginal bleeding, leakage of fluid.   Return to clinic in 1 week and prn if questions or concerns.     Again, thank you for allowing me to participate in the care of your patient.      Sincerely,    Luli Chung CNM      "

## 2019-04-25 LAB
GP B STREP DNA SPEC QL NAA+PROBE: NEGATIVE
SPECIMEN SOURCE: NORMAL

## 2019-04-25 ASSESSMENT — ANXIETY QUESTIONNAIRES: GAD7 TOTAL SCORE: 3

## 2019-04-30 ENCOUNTER — MYC MEDICAL ADVICE (OUTPATIENT)
Dept: OBGYN | Facility: CLINIC | Age: 29
End: 2019-04-30

## 2019-05-01 ENCOUNTER — OFFICE VISIT (OUTPATIENT)
Dept: OBGYN | Facility: CLINIC | Age: 29
End: 2019-05-01
Attending: ADVANCED PRACTICE MIDWIFE
Payer: COMMERCIAL

## 2019-05-01 ENCOUNTER — HOSPITAL ENCOUNTER (OUTPATIENT)
Facility: CLINIC | Age: 29
Discharge: HOME | End: 2019-05-01
Attending: OBSTETRICS & GYNECOLOGY | Admitting: OBSTETRICS & GYNECOLOGY
Payer: COMMERCIAL

## 2019-05-01 VITALS
WEIGHT: 165 LBS | HEIGHT: 66 IN | TEMPERATURE: 98.5 F | BODY MASS INDEX: 26.52 KG/M2 | DIASTOLIC BLOOD PRESSURE: 64 MMHG | SYSTOLIC BLOOD PRESSURE: 99 MMHG

## 2019-05-01 VITALS
DIASTOLIC BLOOD PRESSURE: 77 MMHG | HEART RATE: 73 BPM | SYSTOLIC BLOOD PRESSURE: 124 MMHG | HEIGHT: 66 IN | BODY MASS INDEX: 26.2 KG/M2 | WEIGHT: 163 LBS

## 2019-05-01 DIAGNOSIS — O09.93 HIGH-RISK PREGNANCY IN THIRD TRIMESTER: Primary | ICD-10-CM

## 2019-05-01 LAB
ERYTHROCYTE [DISTWIDTH] IN BLOOD BY AUTOMATED COUNT: 13.6 % (ref 10–15)
HCT VFR BLD AUTO: 36.2 % (ref 35–47)
HGB BLD-MCNC: 12.2 G/DL (ref 11.7–15.7)
MCH RBC QN AUTO: 31.8 PG (ref 26.5–33)
MCHC RBC AUTO-ENTMCNC: 33.7 G/DL (ref 31.5–36.5)
MCV RBC AUTO: 94 FL (ref 78–100)
PLATELET # BLD AUTO: 155 10E9/L (ref 150–450)
RBC # BLD AUTO: 3.84 10E12/L (ref 3.8–5.2)
WBC # BLD AUTO: 9.7 10E9/L (ref 4–11)

## 2019-05-01 PROCEDURE — 59025 FETAL NON-STRESS TEST: CPT | Mod: 76

## 2019-05-01 PROCEDURE — 59025 FETAL NON-STRESS TEST: CPT

## 2019-05-01 PROCEDURE — 59412 ANTEPARTUM MANIPULATION: CPT

## 2019-05-01 PROCEDURE — G0463 HOSPITAL OUTPT CLINIC VISIT: HCPCS | Mod: ZF

## 2019-05-01 PROCEDURE — 96372 THER/PROPH/DIAG INJ SC/IM: CPT

## 2019-05-01 PROCEDURE — 25000128 H RX IP 250 OP 636: Performed by: STUDENT IN AN ORGANIZED HEALTH CARE EDUCATION/TRAINING PROGRAM

## 2019-05-01 PROCEDURE — 85027 COMPLETE CBC AUTOMATED: CPT | Performed by: STUDENT IN AN ORGANIZED HEALTH CARE EDUCATION/TRAINING PROGRAM

## 2019-05-01 RX ORDER — TERBUTALINE SULFATE 1 MG/ML
0.25 INJECTION, SOLUTION SUBCUTANEOUS ONCE
Status: COMPLETED | OUTPATIENT
Start: 2019-05-01 | End: 2019-05-01

## 2019-05-01 RX ORDER — LIDOCAINE 40 MG/G
CREAM TOPICAL
Status: CANCELLED | OUTPATIENT
Start: 2019-05-01

## 2019-05-01 RX ADMIN — TERBUTALINE SULFATE 0.25 MG: 1 INJECTION SUBCUTANEOUS at 08:38

## 2019-05-01 ASSESSMENT — MIFFLIN-ST. JEOR
SCORE: 1480.19
SCORE: 1471.11

## 2019-05-01 ASSESSMENT — PAIN SCALES - GENERAL: PAINLEVEL: NO PAIN (0)

## 2019-05-01 NOTE — PROGRESS NOTES
[facility]  OB History and Physical      Lindsey Giles MRN# [id number]   Age: 29 year old YOB: 1990     CC:  Breech presentaiton    HPI:  Ms. Lindsey Giles is a 29 year old  at 37w0d by LMP c/w 8w4d US, who presents for breech presentaiton.  She no contractions, vaginal bleeding, and loss of fluid.   + normal fetal movement.    Pregnancy Complications:  1. History of gestational hypertension  2. Breech presentation  3. Gestational thrombocytopenia    Prenatal Labs:   Lab Results   Component Value Date    ABO A 10/15/2018    RH Pos 10/15/2018    AS Neg 10/15/2018    HEPBANG Nonreactive 10/15/2018    CHPCRT Negative 10/29/2018    GCPCRT Negative 10/29/2018    HGB 11.6 (L) 2019       GBS Status:   Lab Results   Component Value Date    GBS Negative 2019       OB History  OB History    Para Term  AB Living   2 1 1 0 0 1   SAB TAB Ectopic Multiple Live Births   0 0 0 0 1      # Outcome Date GA Lbr Maycol/2nd Weight Sex Delivery Anes PTL Lv   2 Current            1 Term 2017 40w1d / 00:23 3.771 kg (8 lb 5 oz) M Vag-Spont EPI N EBLLA      Birth Comments: IOL for GHTN.  Pitocin      Complications: Preeclampsia/Hypertension      Obstetric Comments   No PPH, no GDM. +Gestational Hypertension with IOL - no Magnesium Sulfate       PMHx:   No asthma or hypertension.  Hx of gestational hypertension  Hx of depression, no meds    PSHx: No surgeries    Meds: ASA, Vitamin B12, C, iron, PNV      Allergies:  No Known Allergies     SocHx: She denies any tobacco, alcohol, or other drug use during this pregnancy.    ROS:   Complete 10-point ROS negative except as noted in HPI. She denies headache, blurry vision, chest pain, shortness of breath, RUQ pain, nausea, vomiting.    PE:  Vit: No data found.   Gen: Well-appearing, NAD, comfortable   CV: RRR, no mrg   Pulm: CTAB, no wheezes or crackles   Abd: Soft, gravid, non-tender, EFW 6# by Leopolds  Ext: no LE edema  b/l  Cx: Deferred    Pres:  breech by BSUS  Memb: intact    FHT: Baseline 140, moderate variability, present accelerations, no decelerations   [place]: no contractions in 10 minutes      Assessment  Ms. Lidnsey Giles is a 29 year old , at 37w0d by LMP c/w 8w4d US, who presents for scheduled external cephalic verison.      Plan  Admit to L&D for external cephalic version. The risks, benefits, and alternatives of ECV were discussed, including the risks of fetal distress requiring emergent  section, placental abruption, spontaneous rupture of membranes, and unsuccessful version, and version with spontaneous return to breech presentation.    She had time to ask questions and agreed to proceed. Surgical consent was signed.    Breech presentation: ultrasound guidance, terbutaline prior to ECV  FWB: Category 1 FHT.  Continue EFM and repeat after ECV  PNC: Rh positive, Rubella immune, GBS negative, GCT nl, Placenta anterior  Fen/GI: NPO, IVFs.    The patient was discussed with Dr. Edward who is in agreement with the treatment plan.    Amy Schumer, MD  Obstetrics and Gyncology, PGY-2  May 1, 2019 , 8:25 AM          Procedure:  Under ultrasound guidance, fetal head on maternal right.  Using intermittent ultrasound following terbutaline, pressure applied to fetal head and breech. Able to successfully vert fetus in one attempt.  Fetal heart rate 120s.  EFM x1 hour following procedure.    Amy Schumer, MD  Obstetrics and Gynecology, PGY-2  Pager: [phone]  19 8:50 AM    I was present for above procedure.   Juanis Edward MD

## 2019-05-01 NOTE — PLAN OF CARE
OK to discharge per MD. Pt reports no pain, occas irrit, active baby. Discharge instructions given, pt and  have no questions. Discharge to home and self care with .

## 2019-05-01 NOTE — LETTER
"  RE: Lindsey Giles   [address] [place] [address]     Dear Colleague,    Thank you for referring your patient, Lindsey Giles, to the [facility] at [facility]. Please see a copy of my visit note below.    Subjective:      29 year old  at 37w0d presents for a routine prenatal appointment. Had ECV earlier today, successful.         Denies vaginal bleeding,  leakage of fluid, or change in vaginal discharge.  Denies contractions.  Reports feeling regular fetal movement.     No HA, visual changes, RUQ or epigastric pain.   Patient concerns: anxious about fetal presentation.  Feeling well overall.  Transverse lie with fetal head to maternal right.  Objective:  Vitals:    19 1552   BP: 124/77   Pulse: 73   Weight: 73.9 kg (163 lb)   Height: 1.676 m (5' 6\")    See OB flowsheet    Assessment/Plan     Encounter Diagnosis   Name Primary?     High-risk pregnancy in third trimester Yes           PHQ-9 SCORE 3/20/2019 2019 2019   PHQ-9 Total Score 2 2 2     PHQ-9 SCORE 3/20/2019 2019 2019   PHQ-9 Total Score 2 2 2     Reviewed transverse lie after successful ECV today.  Dr. Ayala recommends another attempt at ECV at 39 weeks followed by induction of labor.  Patient receptive to this idea.  Labor signs discussed. Reinforced daily fetal movement counts.  Reviewed why/how to contact provider if headache/visual changes/RUQ or epigastric pain, decreased fetal movement, vaginal bleeding, leakage of fluid.   Return to clinic in 1 week and prn if questions or concerns.     Again, thank you for allowing me to participate in the care of your patient.      Sincerely,    HÉCTOR Wiggins CNM      "

## 2019-05-01 NOTE — PROGRESS NOTES
Fetal heart tracing review:    Baseline: 140 bpm, mod harinder, +accels, no decels  Ctx: no contractions in 10 minutes    Ok to discharge to home.    Amy Schumer, MD   Ob/Gyn PGY-2  05/01/19   10:20 AM

## 2019-05-01 NOTE — DISCHARGE INSTRUCTIONS
Discharge Instruction for Undelivered Patients      You were seen for: version  We Consulted:  Dr. Edward, Dr. Schumer  You had (Test or Medicine): version, labs     Diet:   Drink 8 to 12 glasses of liquids (milk, juice, water) every day.  You may eat meals and snacks.     Activity:  Call your doctor or nurse midwife if your baby is moving less than usual.     Call your provider if you notice:  Swelling in your face or increased swelling in your hands or legs.  Headaches that are not relieved by Tylenol (acetaminophen).  Changes in your vision (blurring: seeing spots or stars.)  Nausea (sick to your stomach) and vomiting (throwing up).   Weight gain of 5 pounds or more per week.  Heartburn that doesn't go away.  Signs of bladder infection: pain when you urinate (use the toilet), need to go more often and more urgently.  The bag of riggins (rupture of membranes) breaks, or you notice leaking in your underwear.  Bright red blood in your underwear.  Abdominal (lower belly) or stomach pain.  For first baby: Contractions (tightening) less than 5 minutes apart for one hour or more.  Second (plus) baby: Contractions (tightening) less than 10 minutes apart and getting stronger.  *If less than 34 weeks: Contractions (tightenings) more than 6 times in one hour.  Increase or change in vaginal discharge (note the color and amount)  Other:     Follow-up:  As scheduled in the clinic

## 2019-05-01 NOTE — PROGRESS NOTES
"Subjective:      29 year old  at 37w0d presents for a routine prenatal appointment. Had ECV earlier today, successful.         Denies vaginal bleeding,  leakage of fluid, or change in vaginal discharge.  Denies contractions.  Reports feeling regular fetal movement.     No HA, visual changes, RUQ or epigastric pain.   Patient concerns: anxious about fetal presentation.  Feeling well overall.  Transverse lie with fetal head to maternal right.  Objective:  Vitals:    19 1552   BP: 124/77   Pulse: 73   Weight: 73.9 kg (163 lb)   Height: 1.676 m (5' 6\")    See OB flowsheet    Assessment/Plan     Encounter Diagnosis   Name Primary?     High-risk pregnancy in third trimester Yes           PHQ-9 SCORE 3/20/2019 2019 2019   PHQ-9 Total Score 2 2 2     PHQ-9 SCORE 3/20/2019 2019 2019   PHQ-9 Total Score 2 2 2     Reviewed transverse lie after successful ECV today.  Dr. Ayala recommends another attempt at ECV at 39 weeks followed by induction of labor.  Patient receptive to this idea.  Labor signs discussed. Reinforced daily fetal movement counts.  Reviewed why/how to contact provider if headache/visual changes/RUQ or epigastric pain, decreased fetal movement, vaginal bleeding, leakage of fluid.   Return to clinic in 1 week and prn if questions or concerns.     HÉCTOR Wiggins CNM  "

## 2019-05-01 NOTE — PLAN OF CARE
Pt to triage for version. Discussed IV start, admission paperwork, terbutaline, monitoring before and after procedure, NPO status. Staff in to see pt, discuss procedure and sign consent. Version successful. Cont monitoring, expectant discharge.

## 2019-05-08 ENCOUNTER — OFFICE VISIT (OUTPATIENT)
Dept: OBGYN | Facility: CLINIC | Age: 29
End: 2019-05-08
Attending: ADVANCED PRACTICE MIDWIFE
Payer: COMMERCIAL

## 2019-05-08 VITALS
HEART RATE: 86 BPM | DIASTOLIC BLOOD PRESSURE: 80 MMHG | WEIGHT: 165.8 LBS | BODY MASS INDEX: 26.65 KG/M2 | HEIGHT: 66 IN | SYSTOLIC BLOOD PRESSURE: 130 MMHG

## 2019-05-08 DIAGNOSIS — Z34.80 PRENATAL CARE, SUBSEQUENT PREGNANCY, UNSPECIFIED TRIMESTER: Primary | ICD-10-CM

## 2019-05-08 DIAGNOSIS — O32.2XX0 TRANSVERSE PRESENTATION, ANTEPARTUM, SINGLE OR UNSPECIFIED FETUS: ICD-10-CM

## 2019-05-08 PROCEDURE — G0463 HOSPITAL OUTPT CLINIC VISIT: HCPCS | Mod: ZF

## 2019-05-08 ASSESSMENT — MIFFLIN-ST. JEOR: SCORE: 1483.81

## 2019-05-08 NOTE — LETTER
"2019    RE: Lindsey CORTES Tisha   [address]  [place] [address]     Subjective:     29 year old  at 38w0d presents for routine prenatal visit.           Denies cramping/contractions, vaginal bleeding, discharge or leakage of fluid. Reports +fetal movement.  No HA, vision changes, ruq/epigastric pain.      Patient concerns: Pt present with  at appt. Feeling well overall but patient tearful re: unstable lie. She has a successful version from breech to cephalic on 19 in the AM, however, when she presented to clinic in the afternoon the fetus had rotated to transverse. At that time it was discussed that another ECV could be done at 39 weeks followed by admission to BP for IOL. Pt and  are conflicted about the decision for ECV and IOL or a scheduled repeat  section. She states she had an IOL with her first pregnancy and found it to be long and traumatic. She does not like the idea of a prolonged IOL that involves cervical ripening, and she feels that she will be anxious about whether the baby will stay cephalic during the induction process. Conversely, it is hard for her to process the idea of a scheduled  section.     She is open to the idea today of a cervical exam and discussion of lambert score and cervical ripening and induction methods.      Objective:  Vitals:    19 0943   BP: 130/80   BP Location: Left arm   Patient Position: Chair   Pulse: 86   Weight: 75.2 kg (165 lb 12.8 oz)   Height: 1.676 m (5' 6\")      See OB flowsheet    Transverse via leopolds with maternal head in LUQ    SVE: closed/long/high, post/firm    Assessment/Plan     Encounter Diagnosis   Name Primary?     [facility] CNM  Yes     Orders Placed This Encounter   Procedures     US OB >14 Weeks Limited wo Fetal Measurement     CBC with Platelets     - Reviewed why/how to contact provider if headache/visual changes/RUQ or epigastric pain, decreased fetal movement, vaginal bleeding, leakage of fluid or " strong/regular contractions.    CBC with plts ordered for gestational thrombocytopenia, however, did not discuss with pt prior to her leaving appointment.  * Will need to complete when she returns for visit on 5/10/19.     Extensive discussion re: concerns r/t unstable lie and management options. Reviewed option of ECV at 39 weeks with IOL after. Discussed lambert score from today's exam and cervical ripening and induction methods, including po or pv misoprostol, pitocin, arom.   Also reviewed process for scheduled  section, including spinal placement (w/o partner present), when partner comes into OR, clear drape, skin to skin, TAPs block.   Pt and  remain undecided. Will continue to discuss the options together.     Pt has appointment with OB MD on Friday to continue to review potential options and process through all of this information.  Will plan for ultrasound to confirm presentation prior to this MD appointment.     Emphasized s/s to present to BP and risks a/w ROM and labor with a transverse fetal presentation.    Continue scheduled prenatal care, RTC on 5/10/19 for ultrasound and appt with MD.     HÉCTOR Chacon, MYRTLE

## 2019-05-08 NOTE — PROGRESS NOTES
"Subjective:     29 year old  at 38w0d presents for routine prenatal visit.           Denies cramping/contractions, vaginal bleeding, discharge or leakage of fluid. Reports +fetal movement.  No HA, vision changes, ruq/epigastric pain.      Patient concerns: Pt present with  at appt. Feeling well overall but patient tearful re: unstable lie. She has a successful version from breech to cephalic on 19 in the AM, however, when she presented to clinic in the afternoon the fetus had rotated to transverse. At that time it was discussed that another ECV could be done at 39 weeks followed by admission to  for IOL. Pt and  are conflicted about the decision for ECV and IOL or a scheduled repeat  section. She states she had an IOL with her first pregnancy and found it to be long and traumatic. She does not like the idea of a prolonged IOL that involves cervical ripening, and she feels that she will be anxious about whether the baby will stay cephalic during the induction process. Conversely, it is hard for her to process the idea of a scheduled  section.     She is open to the idea today of a cervical exam and discussion of lambert score and cervical ripening and induction methods.      Objective:  Vitals:    19 0943   BP: 130/80   BP Location: Left arm   Patient Position: Chair   Pulse: 86   Weight: 75.2 kg (165 lb 12.8 oz)   Height: 1.676 m (5' 6\")      See OB flowsheet    Transverse via leopolds with maternal head in LUQ    SVE: closed/long/high, post/firm    Assessment/Plan     Encounter Diagnosis   Name Primary?     [facility] CNM  Yes     Orders Placed This Encounter   Procedures     US OB >14 Weeks Limited wo Fetal Measurement     CBC with Platelets     - Reviewed why/how to contact provider if headache/visual changes/RUQ or epigastric pain, decreased fetal movement, vaginal bleeding, leakage of fluid or strong/regular contractions.    CBC with plts ordered for gestational " thrombocytopenia, however, did not discuss with pt prior to her leaving appointment.  * Will need to complete when she returns for visit on 5/10/19.     Extensive discussion re: concerns r/t unstable lie and management options. Reviewed option of ECV at 39 weeks with IOL after. Discussed lambert score from today's exam and cervical ripening and induction methods, including po or pv misoprostol, pitocin, arom.   Also reviewed process for scheduled  section, including spinal placement (w/o partner present), when partner comes into OR, clear drape, skin to skin, TAPs block.   Pt and  remain undecided. Will continue to discuss the options together.     Pt has appointment with OB MD on Friday to continue to review potential options and process through all of this information.  Will plan for ultrasound to confirm presentation prior to this MD appointment.     Emphasized s/s to present to BP and risks a/w ROM and labor with a transverse fetal presentation.    Continue scheduled prenatal care, RTC on 5/10/19 for ultrasound and appt with MD.     Malinda Madrigal, HÉCTOR, MYRTLE

## 2019-05-08 NOTE — PROGRESS NOTES
"Subjective:     29 year old  at 38w0d presents for routine prenatal visit.             Denies *** cramping/contractions, vaginal bleeding, discharge or leakage of fluid. Reports +fetal movement.  No HA, vision changes, ruq/epigastric pain.      Patient concerns: Feeling well overall. ***    Objective:  Vitals:    19 0943   BP: 130/80   BP Location: Left arm   Patient Position: Chair   Pulse: 86   Weight: 75.2 kg (165 lb 12.8 oz)   Height: 1.676 m (5' 6\")        See OB flowsheet  Assessment/Plan     Encounter Diagnosis   Name Primary?     [facility]HS CNM  Yes     No orders of the defined types were placed in this encounter.    No orders of the defined types were placed in this encounter.      - Reviewed why/how to contact provider if headache/visual changes/RUQ or epigastric pain, decreased fetal movement, vaginal bleeding, leakage of fluid or strong/regular contractions.   Patient education/orders or handouts today:  {PtEd/orders/[id number]}    - Reviewed late term testing including BPP => 41 weeks and rationale for induction of labor based on results.   Patient desires *** induction or late term testing.      Continue scheduled prenatal care, RTC in *** weeks and prn if questions or concerns.      HÉCTOR Chacon, MYRTLE   "

## 2019-05-08 NOTE — LETTER
"2019       RE: Lindsey Giles   [address]  [place] [address]     Dear Colleague,    Thank you for referring your patient, Lindsey Giles, to the [facility] at [facility]. Please see a copy of my visit note below.    Subjective:     29 year old  at 38w0d presents for routine prenatal visit.             Denies *** cramping/contractions, vaginal bleeding, discharge or leakage of fluid. Reports +fetal movement.  No HA, vision changes, ruq/epigastric pain.      Patient concerns: Feeling well overall. ***    Objective:  Vitals:    19 0943   BP: 130/80   BP Location: Left arm   Patient Position: Chair   Pulse: 86   Weight: 75.2 kg (165 lb 12.8 oz)   Height: 1.676 m (5' 6\")        See OB flowsheet  Assessment/Plan     Encounter Diagnosis   Name Primary?     WHS CNM  Yes     No orders of the defined types were placed in this encounter.    No orders of the defined types were placed in this encounter.      - Reviewed why/how to contact provider if headache/visual changes/RUQ or epigastric pain, decreased fetal movement, vaginal bleeding, leakage of fluid or strong/regular contractions.   Patient education/orders or handouts today:  {PtEd/orders/[id number]}    - Reviewed late term testing including BPP => 41 weeks and rationale for induction of labor based on results.   Patient desires *** induction or late term testing.      Continue scheduled prenatal care, RTC in *** weeks and prn if questions or concerns.      HÉCTOR Chacon, CNSTEF     Subjective:     29 year old  at 38w0d presents for routine prenatal visit.           Denies cramping/contractions, vaginal bleeding, discharge or leakage of fluid. Reports +fetal movement.  No HA, vision changes, ruq/epigastric pain.      Patient concerns: Pt present with  at appt. Feeling well overall but patient tearful re: unstable lie. She has a successful version from " "breech to cephalic on 19 in the AM, however, when she presented to clinic in the afternoon the fetus had rotated to transverse. At that time it was discussed that another ECV could be done at 39 weeks followed by admission to  for IOL. Pt and  are conflicted about the decision for ECV and IOL or a scheduled repeat  section. She states she had an IOL with her first pregnancy and found it to be long and traumatic. She does not like the idea of a prolonged IOL that involves cervical ripening, and she feels that she will be anxious about whether the baby will stay cephalic during the induction process. Conversely, it is hard for her to process the idea of a scheduled  section.     She is open to the idea today of a cervical exam and discussion of lambert score and cervical ripening and induction methods.      Objective:  Vitals:    19 0943   BP: 130/80   BP Location: Left arm   Patient Position: Chair   Pulse: 86   Weight: 75.2 kg (165 lb 12.8 oz)   Height: 1.676 m (5' 6\")      See OB flowsheet    Transverse via leopolds with maternal head in LUQ    SVE: closed/long/high, post/firm    Assessment/Plan     Encounter Diagnosis   Name Primary?     [facility] CNM  Yes     Orders Placed This Encounter   Procedures     US OB >14 Weeks Limited wo Fetal Measurement     CBC with Platelets     - Reviewed why/how to contact provider if headache/visual changes/RUQ or epigastric pain, decreased fetal movement, vaginal bleeding, leakage of fluid or strong/regular contractions.    CBC with plts ordered for gestational thrombocytopenia, however, did not discuss with pt prior to her leaving appointment.  * Will need to complete when she returns for visit on 5/10/19.     Extensive discussion re: concerns r/t unstable lie and management options. Reviewed option of ECV at 39 weeks with IOL after. Discussed lambert score from today's exam and cervical ripening and induction methods, including po or pv " misoprostol, pitocin, arom.   Also reviewed process for scheduled  section, including spinal placement (w/o partner present), when partner comes into OR, clear drape, skin to skin, TAPs block.   Pt and  remain undecided. Will continue to discuss the options together.     Pt has appointment with OB MD on Friday to continue to review potential options and process through all of this information.  Will plan for ultrasound to confirm presentation prior to this MD appointment.     Emphasized s/s to present to BP and risks a/w ROM and labor with a transverse fetal presentation.    Continue scheduled prenatal care, RTC on 5/10/19 for ultrasound and appt with MD.     Malinda Madrigal, HÉCTOR, MYRTLE

## 2019-05-10 ENCOUNTER — HOSPITAL ENCOUNTER (OUTPATIENT)
Dept: ULTRASOUND IMAGING | Facility: CLINIC | Age: 29
Discharge: HOME | End: 2019-05-10
Attending: ADVANCED PRACTICE MIDWIFE | Admitting: ADVANCED PRACTICE MIDWIFE
Payer: COMMERCIAL

## 2019-05-10 ENCOUNTER — OFFICE VISIT (OUTPATIENT)
Dept: OBGYN | Facility: CLINIC | Age: 29
End: 2019-05-10
Payer: COMMERCIAL

## 2019-05-10 VITALS
HEART RATE: 91 BPM | WEIGHT: 165.1 LBS | HEIGHT: 66 IN | BODY MASS INDEX: 26.53 KG/M2 | DIASTOLIC BLOOD PRESSURE: 78 MMHG | SYSTOLIC BLOOD PRESSURE: 120 MMHG

## 2019-05-10 DIAGNOSIS — Z34.80 PRENATAL CARE, SUBSEQUENT PREGNANCY, UNSPECIFIED TRIMESTER: ICD-10-CM

## 2019-05-10 DIAGNOSIS — Z87.59 HISTORY OF GESTATIONAL HYPERTENSION: ICD-10-CM

## 2019-05-10 DIAGNOSIS — O32.0XX0 UNSTABLE FETAL LIE, SINGLE OR UNSPECIFIED FETUS: ICD-10-CM

## 2019-05-10 DIAGNOSIS — O32.2XX0 TRANSVERSE PRESENTATION, ANTEPARTUM, SINGLE OR UNSPECIFIED FETUS: ICD-10-CM

## 2019-05-10 DIAGNOSIS — Z3A.39 39 WEEKS GESTATION OF PREGNANCY: Primary | ICD-10-CM

## 2019-05-10 DIAGNOSIS — Z86.59 HISTORY OF DEPRESSION: ICD-10-CM

## 2019-05-10 DIAGNOSIS — D50.9 IRON DEFICIENCY ANEMIA, UNSPECIFIED IRON DEFICIENCY ANEMIA TYPE: ICD-10-CM

## 2019-05-10 LAB
DEPRECATED S PYO AG THROAT QL EIA: NORMAL
SPECIMEN SOURCE: NORMAL

## 2019-05-10 PROCEDURE — G0463 HOSPITAL OUTPT CLINIC VISIT: HCPCS | Mod: ZF

## 2019-05-10 PROCEDURE — 76815 OB US LIMITED FETUS(S): CPT

## 2019-05-10 PROCEDURE — 87880 STREP A ASSAY W/OPTIC: CPT | Performed by: OBSTETRICS & GYNECOLOGY

## 2019-05-10 PROCEDURE — 87070 CULTURE OTHR SPECIMN AEROBIC: CPT | Performed by: OBSTETRICS & GYNECOLOGY

## 2019-05-10 RX ORDER — SODIUM CHLORIDE, SODIUM LACTATE, POTASSIUM CHLORIDE, CALCIUM CHLORIDE 600; 310; 30; 20 MG/100ML; MG/100ML; MG/100ML; MG/100ML
INJECTION, SOLUTION INTRAVENOUS CONTINUOUS
Status: CANCELLED | OUTPATIENT
Start: 2019-05-10

## 2019-05-10 RX ORDER — CEFAZOLIN SODIUM 1 G/3ML
1 INJECTION, POWDER, FOR SOLUTION INTRAMUSCULAR; INTRAVENOUS SEE ADMIN INSTRUCTIONS
Status: CANCELLED | OUTPATIENT
Start: 2019-05-10

## 2019-05-10 RX ORDER — CEFAZOLIN SODIUM 2 G/100ML
2 INJECTION, SOLUTION INTRAVENOUS
Status: CANCELLED | OUTPATIENT
Start: 2019-05-10

## 2019-05-10 RX ORDER — CITRIC ACID/SODIUM CITRATE 334-500MG
30 SOLUTION, ORAL ORAL
Status: CANCELLED | OUTPATIENT
Start: 2019-05-10

## 2019-05-10 ASSESSMENT — MIFFLIN-ST. JEOR: SCORE: 1480.64

## 2019-05-10 NOTE — PROGRESS NOTES
"[facility]  Return OB Visit    Subjective:   Lindsey presents today with her  to discuss delivery options. She has been very anxious since her encounter for version as it was ultimately unsuccessful. She has a lot of anxiety regarding potential unstable lie during an induction of labor as well as potential for another prolonged induction.  On the other hand, she also expresses concern regarding surgery.  She understands it is her decision, but has had a hard time making a decision. They wish to review both options again today. Lastly, she expresses concern regarding strep exposure at work and wishes to be tested today.    Objective:  /78 (BP Location: Left arm, Patient Position: Chair)   Pulse 91   Ht 1.676 m (5' 6\")   Wt 74.9 kg (165 lb 1.6 oz)   LMP 08/15/2018   BMI 26.65 kg/m    Gen: Well-appearing, NAD    Assessment/Plan:  Lindsey Giles is a 29 year old  at 38w2d by LMP c/w 8w4d US, here for return OB visit and discussion of delivery options. Her pregnancy is complicated by: unstable lie, anemia, GTCP, and history of depression and GHTN.    - Unstable lie   - Prior successful version, but with return to transverse/breech presentation within 24 hours of procedure.   - Discussed delivery options, including admission to L&D for version and if successful proceeding with induction of labor. Discussed early AROM once able to secure fetal presentation and likely shorter nature of induction in comparison to prior delivery. Reviewed risks, including unstable lie or need for  section, including fetal intolerance or other obstetrical indications.    - Discussed alternative of scheduled  section. Reviewed risks of surgery, including bleeding, infection, and/or injury to surrounding organs. Also reviewed lifting restrictions after surgery. Discussed benefit of scheduling as far as her anxiety is concerned and providing .   - After prolonged discussion, patient wishes to " proceed with scheduled  section.  Scheduled for 8:30 on 5/15/19.    - Strep exposure   - Rapid strep and throat culture obtained. Please call with results      Staffed with Dr. Toma Martini MD  Ob/Gyn, PGY-4  5/10/2019, 3:24 PM    Patient was seen by the resident in [facility].  I reviewed the history & exam.  The patient's assessment and plan were made jointly.    Kelli Chua MD MPH

## 2019-05-11 ENCOUNTER — TELEPHONE (OUTPATIENT)
Dept: OBGYN | Facility: CLINIC | Age: 29
End: 2019-05-11

## 2019-05-11 NOTE — TELEPHONE ENCOUNTER
Patient calling for results of Strep tests completed secondary to sore throat.  Discussed negative rapid strep test and culture still pending, prelim without any concerns.  Discussed I would keep any eye on culture today and if it returns back positive, will call, but otherwise no further news is good news.  Patient appreciated call today.  Liz Hicks MD

## 2019-05-12 LAB
BACTERIA SPEC CULT: NORMAL
Lab: NORMAL
SPECIMEN SOURCE: NORMAL

## 2019-05-14 ENCOUNTER — ANESTHESIA EVENT (OUTPATIENT)
Dept: OBGYN | Facility: CLINIC | Age: 29
End: 2019-05-14
Payer: COMMERCIAL

## 2019-05-14 NOTE — ANESTHESIA PREPROCEDURE EVALUATION
"Anesthesia Pre-Procedure Evaluation    Patient: Lindsey Giles   MRN:     [id number] Gender:   female   Age:    29 year old :      1990        Preoperative Diagnosis: Breech Presentation   Procedure(s):  Primary  Section     Past Medical History:   Diagnosis Date     History of depression     Situational in high school - Lexapro. No SI or hospitalizations     History of gestational hypertension       Past Surgical History:   Procedure Laterality Date     NO HISTORY OF SURGERY            Anesthesia Evaluation     . Pt has had prior anesthetic. Type: Regional    No history of anesthetic complications          ROS/MED HX    ENT/Pulmonary:  - neg pulmonary ROS   (+), recent URI unresolved \"a little congestion\" x 5 days. no fevers or problems breathing: . .   (-) asthma   Neurologic:  - neg neurologic ROS    (-) seizures and CVA   Cardiovascular:     (+) hypertension (gestational htn with last pregnancy. Not this pregnancy)----. : . . . :. .       METS/Exercise Tolerance:  >4 METS   Hematologic:     (+) Other Hematologic Disorder-ideopathic thrombocytopenia     (-) anemia   Musculoskeletal:  - neg musculoskeletal ROS       GI/Hepatic:  - neg GI/hepatic ROS      (-) GERD   Renal/Genitourinary:  - ROS Renal section negative       Endo:  - neg endo ROS    (-) Type II DM and thyroid disease   Psychiatric:     (+) psychiatric history depression      Infectious Disease:  - neg infectious disease ROS       Malignancy:      - no malignancy   Other: Comment: had an IOL with her first pregnancy and found it to be long and traumatic. She does not like the idea of a prolonged IOL that involves cervical ripening, and she feels that she will be anxious about whether the baby will stay cephalic during the induction process. Conversely, it is hard for her to process the idea of a scheduled  section.    (+) Possibly pregnant                        PHYSICAL EXAM:   Mental Status/Neuro: A/A/O   Airway: Facies: " "Feasible  Mallampati: I  Mouth/Opening: Full  TM distance: > 6 cm  Neck ROM: Full   Respiratory: Auscultation: CTAB     Resp. Rate: Normal     Resp. Effort: Normal      CV: Rhythm: Regular  Rate: Age appropriate  Heart: Normal Sounds   Comments:      Dental: Normal                  Lab Results   Component Value Date    WBC 9.7 05/01/2019    HGB 12.2 05/01/2019    HCT 36.2 05/01/2019     05/01/2019     10/15/2018    POTASSIUM 4.1 10/15/2018    CHLORIDE 108 10/15/2018    CO2 25 10/15/2018    BUN 8 10/15/2018    CR 0.43 (L) 10/15/2018    GLC 94 10/15/2018    BALTAZAR 8.8 10/15/2018    ALT 18 10/15/2018    AST 10 10/15/2018       Preop Vitals  BP Readings from Last 3 Encounters:   05/10/19 120/78   05/08/19 130/80   05/01/19 124/77    Pulse Readings from Last 3 Encounters:   05/10/19 91   05/08/19 86   05/01/19 73      Resp Readings from Last 3 Encounters:   12/10/17 14   03/26/17 14    SpO2 Readings from Last 3 Encounters:   12/10/17 97%   03/26/17 97%      Temp Readings from Last 1 Encounters:   05/01/19 36.9  C (98.5  F) (Oral)    Ht Readings from Last 1 Encounters:   05/10/19 1.676 m (5' 6\")      Wt Readings from Last 1 Encounters:   05/10/19 74.9 kg (165 lb 1.6 oz)    Estimated body mass index is 26.65 kg/m  as calculated from the following:    Height as of 5/10/19: 1.676 m (5' 6\").    Weight as of 5/10/19: 74.9 kg (165 lb 1.6 oz).     LDA:            Assessment:   ASA SCORE: 2    NPO Status: > 6 hours since completed Solid Foods   Documentation: H&P complete; Preop Testing complete; Consents complete   Proceeding: Proceed without further delay  Tobacco Use:  NO Active use of Tobacco/UNKNOWN Tobacco use status     Plan:   Anes. Type:  Regional; MAC     RA Details:  Labor/OB Procedure;      RA-Location/Type: Spinal   Pre-Induction: None     Drips/Meds-Preparation: Oxytocin; Phenylephrine   Induction:  Not applicable   Airway: Native Airway   Access/Monitoring: PIV   Maintenance: N/a   Emergence: Not " Applicable   Logistics: Observation/Admission     Postop Pain/Sedation Strategy:  Standard-Options: Block SS; Exparel     PONV Management:  Adult Risk Factors: Female, Non-Smoker  Prevention: Ondansetron     CONSENT: Direct conversation   Plan and risks discussed with: Patient   Blood Products: Consented (ALL Blood Products)       Comments for Plan/Consent:  Discussed risks of anesthesia including nausea, vomiting, sore throat, dental damage, cardiopulmonary complications, blood transfusion, neurologic complications, headache, bleeding, infection, itching, and serious complications.                           Ga Barnes MD

## 2019-05-14 NOTE — H&P
[facility]  OB History and Physical      Lindsey Giles MRN# [id number]   Age: 29 year old YOB: 1990     CC:  Primary  for breech  HPI:  Ms. Lindsey Giles is a 29 year old  at 39w0d by LMP c/w 8w4d US, who presents for scheduled primary  section.  She denies contractions, vaginal bleeding, and loss of fluid.   + normal fetal movement.    Pregnancy Complications:  Unstable lie - s/p ECV at 37w then back to transverse   gTCP   Hx gHTN    Prenatal Labs:   Lab Results   Component Value Date    ABO A 05/15/2019    RH Pos 05/15/2019    AS Neg 05/15/2019    HEPBANG Nonreactive 10/15/2018    CHPCRT Negative 10/29/2018    GCPCRT Negative 10/29/2018    HGB 11.7 05/15/2019       GBS Status:   Lab Results   Component Value Date    GBS Negative 2019       Ultrasounds  Dating - 8w4d  Anatomy -  Cardiac activity present.  bpm.  Fetal movements present.  Presentation tranverse with head to maternal left.  Placenta anterior, no previa .  Umbilical cord 3 vessel cord.  Amniotic fluid MVP 5.0 cm.    IMPRESSION  ---------------------------------------------------------------------------------------------------------  1) Intrauterine pregnancy at 18 0/7 weeks gestational age.  2) None of the anomalies commonly detected by ultrasound were evident in the detailed fetal anatomic survey described above.  3) Growth parameters and estimated fetal weight were consistent with an appropriate for gestation age pattern of growth.  4) The amniotic fluid volume appeared normal.    5/10 Presentation Check    IMPRESSION:   Fetal position is oblique breech with the head towards the maternal  Right.    OB History  OB History    Para Term  AB Living   2 1 1 0 0 1   SAB TAB Ectopic Multiple Live Births   0 0 0 0 1      # Outcome Date GA Lbr Maycol/2nd Weight Sex Delivery Anes PTL Lv   2 Current            1 Term 2017 40w1d / 00:23 3.771 kg (8 lb 5 oz) M Vag-Spont  "EPI N BELLA      Birth Comments: IOL for GHTN.  Pitocin      Complications: Preeclampsia/Hypertension      Obstetric Comments   No PPH, no GDM. +Gestational Hypertension with IOL - no Magnesium Sulfate       PMHx:   Past Medical History:   Diagnosis Date     History of depression     Situational in high school - Lexapro. No SI or hospitalizations     History of gestational hypertension 2017     PSHx:   Past Surgical History:   Procedure Laterality Date     NO HISTORY OF SURGERY       Meds:   Medications Prior to Admission   Medication Sig Dispense Refill Last Dose     aspirin (ASA) 81 MG EC tablet Take 1 tablet (81 mg) by mouth daily 90 tablet 3 5/14/2019 at Unknown time     cholecalciferol 2000 units CAPS Take 2,000 Units by mouth daily Take one capsule daily.   5/14/2019 at Unknown time     Ferrous Sulfate (IRON) 325 (65 Fe) MG tablet Take 1 tablet by mouth daily 60 tablet 3 5/14/2019 at Unknown time     Prenatal Multivit-Min-Fe-FA (PRENATAL VITAMINS) 0.8 MG TABS  30 tablet  5/14/2019 at Unknown time     vitamin B-12 (CYANOCOBALAMIN) 1000 MCG tablet Take 1 tablet (1,000 mcg) by mouth daily 90 tablet 3 5/14/2019 at Unknown time     vitamin C (ASCORBIC ACID) 250 MG tablet Take 1 tablet (250 mg) by mouth daily 90 tablet 3 5/14/2019 at Unknown time     Allergies:  No Known Allergies   FmHx:   Family History   Problem Relation Age of Onset     Musculoskeletal Disorder Mother      Multiple Sclerosis Mother 42        doing well,     Hypertension Mother      Diabetes Maternal Grandmother      Autism Spectrum Disorder Other      Autism Spectrum Disorder Cousin      SocHx: She denies any tobacco, alcohol, or other drug use during this pregnancy.    ROS:   Complete 10-point ROS negative except as noted in HPI.     PE:  Vit:   Patient Vitals for the past 4 hrs:   BP Temp Temp src Pulse Height Weight   05/15/19 0648 119/80 98.6  F (37  C) Oral 100 1.676 m (5' 6\") 74.8 kg (165 lb)      Gen: Well-appearing, NAD, comfortable "   CV: rrr, no mrg   Pulm: Ctab, no wheezes or crackles  Abd: Soft, gravid, non-tender  Ext: no LE edema     Pres:  Breech/oblique by BSUS, head in maternal left   EFW:  7# by Leopold's  Memb: intact              FHT: Baseline 135, mod variability,  accelerations , no decelerations   [place]: 1 contractions in 10 minutes      Assessment  Ms. Lindsey Giles is a 29 year old , at 39w0d by LMP c/w 8w4d US, who presents for scheduled primary  for breech presentation.    Plan  Admit to L&D  Labor: Scheduled   FWB: Category I FHT.  Continue EFM and toco  Pain: Anesthesia consult for spinal  PNC: Rh pos, Rubella immune, GBS neg, GCT 98, Placenta anterior  Fen/GI: NPO/IVF    GTCP:  plt 141 today; repeat CBC in AM     The patient was discussed with Dr. Hicks who is in agreement with the treatment plan.    Giovanna Wang MD PGY2  Department of OB/GYN  5/15/2019 8:24 AM     Staff MD Note    I appreciate the note by Dr. Wang.  Any necessary changes have been made by me.  I evaluated the patient with the resident and agree with the assessment and plan.    Liz Hicks MD

## 2019-05-15 ENCOUNTER — HOSPITAL ENCOUNTER (INPATIENT)
Facility: CLINIC | Age: 29
LOS: 3 days | Discharge: HOME HEALTH | End: 2019-05-18
Attending: OBSTETRICS & GYNECOLOGY | Admitting: OBSTETRICS & GYNECOLOGY
Payer: COMMERCIAL

## 2019-05-15 ENCOUNTER — ANESTHESIA (OUTPATIENT)
Dept: OBGYN | Facility: CLINIC | Age: 29
End: 2019-05-15
Payer: COMMERCIAL

## 2019-05-15 DIAGNOSIS — Z3A.39 39 WEEKS GESTATION OF PREGNANCY: ICD-10-CM

## 2019-05-15 DIAGNOSIS — Z98.891 S/P CESAREAN SECTION: Primary | ICD-10-CM

## 2019-05-15 LAB
ABO + RH BLD: NORMAL
ABO + RH BLD: NORMAL
BLD GP AB SCN SERPL QL: NORMAL
BLOOD BANK CMNT PATIENT-IMP: NORMAL
ERYTHROCYTE [DISTWIDTH] IN BLOOD BY AUTOMATED COUNT: 13.1 % (ref 10–15)
HCT VFR BLD AUTO: 34.9 % (ref 35–47)
HGB BLD-MCNC: 11.7 G/DL (ref 11.7–15.7)
MCH RBC QN AUTO: 31.5 PG (ref 26.5–33)
MCHC RBC AUTO-ENTMCNC: 33.5 G/DL (ref 31.5–36.5)
MCV RBC AUTO: 94 FL (ref 78–100)
PLATELET # BLD AUTO: 141 10E9/L (ref 150–450)
RBC # BLD AUTO: 3.71 10E12/L (ref 3.8–5.2)
SPECIMEN EXP DATE BLD: NORMAL
T PALLIDUM AB SER QL: NONREACTIVE
WBC # BLD AUTO: 9 10E9/L (ref 4–11)

## 2019-05-15 PROCEDURE — 25000128 H RX IP 250 OP 636: Performed by: STUDENT IN AN ORGANIZED HEALTH CARE EDUCATION/TRAINING PROGRAM

## 2019-05-15 PROCEDURE — 25800030 ZZH RX IP 258 OP 636: Performed by: STUDENT IN AN ORGANIZED HEALTH CARE EDUCATION/TRAINING PROGRAM

## 2019-05-15 PROCEDURE — 86901 BLOOD TYPING SEROLOGIC RH(D): CPT | Performed by: OBSTETRICS & GYNECOLOGY

## 2019-05-15 PROCEDURE — 37000008 ZZH ANESTHESIA TECHNICAL FEE, 1ST 30 MIN: Performed by: OBSTETRICS & GYNECOLOGY

## 2019-05-15 PROCEDURE — 85027 COMPLETE CBC AUTOMATED: CPT | Performed by: OBSTETRICS & GYNECOLOGY

## 2019-05-15 PROCEDURE — 37000009 ZZH ANESTHESIA TECHNICAL FEE, EACH ADDTL 15 MIN: Performed by: OBSTETRICS & GYNECOLOGY

## 2019-05-15 PROCEDURE — 25000125 ZZHC RX 250: Performed by: STUDENT IN AN ORGANIZED HEALTH CARE EDUCATION/TRAINING PROGRAM

## 2019-05-15 PROCEDURE — 27110028 ZZH OR GENERAL SUPPLY NON-STERILE: Performed by: OBSTETRICS & GYNECOLOGY

## 2019-05-15 PROCEDURE — 40000170 ZZH STATISTIC PRE-PROCEDURE ASSESSMENT II: Performed by: OBSTETRICS & GYNECOLOGY

## 2019-05-15 PROCEDURE — 86900 BLOOD TYPING SEROLOGIC ABO: CPT | Performed by: OBSTETRICS & GYNECOLOGY

## 2019-05-15 PROCEDURE — 71000015 ZZH RECOVERY PHASE 1 LEVEL 2 EA ADDTL HR: Performed by: OBSTETRICS & GYNECOLOGY

## 2019-05-15 PROCEDURE — 71000014 ZZH RECOVERY PHASE 1 LEVEL 2 FIRST HR: Performed by: OBSTETRICS & GYNECOLOGY

## 2019-05-15 PROCEDURE — C9290 INJ, BUPIVACAINE LIPOSOME: HCPCS | Performed by: STUDENT IN AN ORGANIZED HEALTH CARE EDUCATION/TRAINING PROGRAM

## 2019-05-15 PROCEDURE — 27210794 ZZH OR GENERAL SUPPLY STERILE: Performed by: OBSTETRICS & GYNECOLOGY

## 2019-05-15 PROCEDURE — 25000132 ZZH RX MED GY IP 250 OP 250 PS 637: Performed by: STUDENT IN AN ORGANIZED HEALTH CARE EDUCATION/TRAINING PROGRAM

## 2019-05-15 PROCEDURE — 36000059 ZZH SURGERY LEVEL 3 EA 15 ADDTL MIN UMMC: Performed by: OBSTETRICS & GYNECOLOGY

## 2019-05-15 PROCEDURE — 86780 TREPONEMA PALLIDUM: CPT | Performed by: OBSTETRICS & GYNECOLOGY

## 2019-05-15 PROCEDURE — 86850 RBC ANTIBODY SCREEN: CPT | Performed by: OBSTETRICS & GYNECOLOGY

## 2019-05-15 PROCEDURE — 40000977 ZZH STATISTIC ATTENDANCE AT DELIVERY

## 2019-05-15 PROCEDURE — 25800030 ZZH RX IP 258 OP 636: Performed by: OBSTETRICS & GYNECOLOGY

## 2019-05-15 PROCEDURE — 12000001 ZZH R&B MED SURG/OB UMMC

## 2019-05-15 PROCEDURE — 25000132 ZZH RX MED GY IP 250 OP 250 PS 637: Performed by: OBSTETRICS & GYNECOLOGY

## 2019-05-15 PROCEDURE — 36000057 ZZH SURGERY LEVEL 3 1ST 30 MIN - UMMC: Performed by: OBSTETRICS & GYNECOLOGY

## 2019-05-15 PROCEDURE — 25000566 ZZH SEVOFLURANE, EA 15 MIN: Performed by: OBSTETRICS & GYNECOLOGY

## 2019-05-15 RX ORDER — OXYTOCIN/0.9 % SODIUM CHLORIDE 30/500 ML
100 PLASTIC BAG, INJECTION (ML) INTRAVENOUS CONTINUOUS
Status: DISCONTINUED | OUTPATIENT
Start: 2019-05-15 | End: 2019-05-18 | Stop reason: HOSPADM

## 2019-05-15 RX ORDER — OXYTOCIN 10 [USP'U]/ML
10 INJECTION, SOLUTION INTRAMUSCULAR; INTRAVENOUS
Status: DISCONTINUED | OUTPATIENT
Start: 2019-05-15 | End: 2019-05-18 | Stop reason: HOSPADM

## 2019-05-15 RX ORDER — FENTANYL CITRATE 50 UG/ML
10 INJECTION, SOLUTION INTRAMUSCULAR; INTRAVENOUS ONCE
Status: COMPLETED | OUTPATIENT
Start: 2019-05-15 | End: 2019-05-15

## 2019-05-15 RX ORDER — NALOXONE HYDROCHLORIDE 0.4 MG/ML
.1-.4 INJECTION, SOLUTION INTRAMUSCULAR; INTRAVENOUS; SUBCUTANEOUS
Status: DISCONTINUED | OUTPATIENT
Start: 2019-05-15 | End: 2019-05-15

## 2019-05-15 RX ORDER — AMOXICILLIN 250 MG
1 CAPSULE ORAL 2 TIMES DAILY PRN
Status: DISCONTINUED | OUTPATIENT
Start: 2019-05-15 | End: 2019-05-18 | Stop reason: HOSPADM

## 2019-05-15 RX ORDER — KETOROLAC TROMETHAMINE 30 MG/ML
30 INJECTION, SOLUTION INTRAMUSCULAR; INTRAVENOUS EVERY 6 HOURS
Status: COMPLETED | OUTPATIENT
Start: 2019-05-15 | End: 2019-05-16

## 2019-05-15 RX ORDER — CEFAZOLIN SODIUM 2 G/100ML
2 INJECTION, SOLUTION INTRAVENOUS
Status: DISCONTINUED | OUTPATIENT
Start: 2019-05-15 | End: 2019-05-15 | Stop reason: HOSPADM

## 2019-05-15 RX ORDER — HYDROCORTISONE 2.5 %
CREAM (GRAM) TOPICAL 3 TIMES DAILY PRN
Status: DISCONTINUED | OUTPATIENT
Start: 2019-05-15 | End: 2019-05-18 | Stop reason: HOSPADM

## 2019-05-15 RX ORDER — MORPHINE SULFATE 1 MG/ML
100 INJECTION, SOLUTION EPIDURAL; INTRATHECAL; INTRAVENOUS ONCE
Status: COMPLETED | OUTPATIENT
Start: 2019-05-15 | End: 2019-05-15

## 2019-05-15 RX ORDER — NALBUPHINE HYDROCHLORIDE 10 MG/ML
2.5-5 INJECTION, SOLUTION INTRAMUSCULAR; INTRAVENOUS; SUBCUTANEOUS EVERY 6 HOURS PRN
Status: DISCONTINUED | OUTPATIENT
Start: 2019-05-15 | End: 2019-05-15

## 2019-05-15 RX ORDER — FENTANYL CITRATE 50 UG/ML
25-50 INJECTION, SOLUTION INTRAMUSCULAR; INTRAVENOUS
Status: DISCONTINUED | OUTPATIENT
Start: 2019-05-15 | End: 2019-05-15 | Stop reason: HOSPADM

## 2019-05-15 RX ORDER — SODIUM CHLORIDE, SODIUM LACTATE, POTASSIUM CHLORIDE, CALCIUM CHLORIDE 600; 310; 30; 20 MG/100ML; MG/100ML; MG/100ML; MG/100ML
INJECTION, SOLUTION INTRAVENOUS CONTINUOUS
Status: DISCONTINUED | OUTPATIENT
Start: 2019-05-15 | End: 2019-05-15 | Stop reason: HOSPADM

## 2019-05-15 RX ORDER — SIMETHICONE 80 MG
80 TABLET,CHEWABLE ORAL 4 TIMES DAILY PRN
Status: DISCONTINUED | OUTPATIENT
Start: 2019-05-15 | End: 2019-05-18 | Stop reason: HOSPADM

## 2019-05-15 RX ORDER — OXYTOCIN/0.9 % SODIUM CHLORIDE 30/500 ML
340 PLASTIC BAG, INJECTION (ML) INTRAVENOUS CONTINUOUS PRN
Status: DISCONTINUED | OUTPATIENT
Start: 2019-05-15 | End: 2019-05-18 | Stop reason: HOSPADM

## 2019-05-15 RX ORDER — ONDANSETRON 2 MG/ML
4 INJECTION INTRAMUSCULAR; INTRAVENOUS EVERY 30 MIN PRN
Status: DISCONTINUED | OUTPATIENT
Start: 2019-05-15 | End: 2019-05-15 | Stop reason: HOSPADM

## 2019-05-15 RX ORDER — IBUPROFEN 800 MG/1
800 TABLET, FILM COATED ORAL EVERY 6 HOURS PRN
Status: DISCONTINUED | OUTPATIENT
Start: 2019-05-15 | End: 2019-05-18 | Stop reason: HOSPADM

## 2019-05-15 RX ORDER — KETOROLAC TROMETHAMINE 30 MG/ML
INJECTION, SOLUTION INTRAMUSCULAR; INTRAVENOUS PRN
Status: DISCONTINUED | OUTPATIENT
Start: 2019-05-15 | End: 2019-05-15

## 2019-05-15 RX ORDER — BUPIVACAINE HYDROCHLORIDE 7.5 MG/ML
1.6 INJECTION, SOLUTION EPIDURAL; RETROBULBAR ONCE
Status: DISCONTINUED | OUTPATIENT
Start: 2019-05-15 | End: 2019-05-15

## 2019-05-15 RX ORDER — ACETAMINOPHEN 325 MG/1
650 TABLET ORAL EVERY 4 HOURS PRN
Status: DISCONTINUED | OUTPATIENT
Start: 2019-05-18 | End: 2019-05-18 | Stop reason: HOSPADM

## 2019-05-15 RX ORDER — BISACODYL 10 MG
10 SUPPOSITORY, RECTAL RECTAL DAILY PRN
Status: DISCONTINUED | OUTPATIENT
Start: 2019-05-17 | End: 2019-05-18 | Stop reason: HOSPADM

## 2019-05-15 RX ORDER — BUPIVACAINE HYDROCHLORIDE 7.5 MG/ML
INJECTION, SOLUTION INTRASPINAL PRN
Status: DISCONTINUED | OUTPATIENT
Start: 2019-05-15 | End: 2019-05-15

## 2019-05-15 RX ORDER — OXYCODONE HYDROCHLORIDE 5 MG/1
5-10 TABLET ORAL
Status: DISCONTINUED | OUTPATIENT
Start: 2019-05-15 | End: 2019-05-18 | Stop reason: HOSPADM

## 2019-05-15 RX ORDER — CARBOPROST TROMETHAMINE 250 UG/ML
250 INJECTION, SOLUTION INTRAMUSCULAR
Status: DISCONTINUED | OUTPATIENT
Start: 2019-05-15 | End: 2019-05-18 | Stop reason: HOSPADM

## 2019-05-15 RX ORDER — FLUMAZENIL 0.1 MG/ML
0.2 INJECTION, SOLUTION INTRAVENOUS
Status: DISCONTINUED | OUTPATIENT
Start: 2019-05-15 | End: 2019-05-15 | Stop reason: HOSPADM

## 2019-05-15 RX ORDER — CEFAZOLIN SODIUM 2 G/100ML
INJECTION, SOLUTION INTRAVENOUS
Status: DISCONTINUED
Start: 2019-05-15 | End: 2019-05-15 | Stop reason: HOSPADM

## 2019-05-15 RX ORDER — NALOXONE HYDROCHLORIDE 0.4 MG/ML
.1-.4 INJECTION, SOLUTION INTRAMUSCULAR; INTRAVENOUS; SUBCUTANEOUS
Status: DISCONTINUED | OUTPATIENT
Start: 2019-05-15 | End: 2019-05-15 | Stop reason: HOSPADM

## 2019-05-15 RX ORDER — MISOPROSTOL 200 UG/1
800 TABLET ORAL
Status: DISCONTINUED | OUTPATIENT
Start: 2019-05-15 | End: 2019-05-18 | Stop reason: HOSPADM

## 2019-05-15 RX ORDER — OXYTOCIN/0.9 % SODIUM CHLORIDE 30/500 ML
PLASTIC BAG, INJECTION (ML) INTRAVENOUS
Status: DISCONTINUED
Start: 2019-05-15 | End: 2019-05-15 | Stop reason: HOSPADM

## 2019-05-15 RX ORDER — LIDOCAINE 40 MG/G
CREAM TOPICAL
Status: DISCONTINUED | OUTPATIENT
Start: 2019-05-15 | End: 2019-05-18 | Stop reason: HOSPADM

## 2019-05-15 RX ORDER — CITRIC ACID/SODIUM CITRATE 334-500MG
30 SOLUTION, ORAL ORAL ONCE
Status: DISCONTINUED | OUTPATIENT
Start: 2019-05-15 | End: 2019-05-15

## 2019-05-15 RX ORDER — ONDANSETRON 2 MG/ML
INJECTION INTRAMUSCULAR; INTRAVENOUS PRN
Status: DISCONTINUED | OUTPATIENT
Start: 2019-05-15 | End: 2019-05-15

## 2019-05-15 RX ORDER — OXYTOCIN/0.9 % SODIUM CHLORIDE 30/500 ML
PLASTIC BAG, INJECTION (ML) INTRAVENOUS CONTINUOUS PRN
Status: DISCONTINUED | OUTPATIENT
Start: 2019-05-15 | End: 2019-05-15

## 2019-05-15 RX ORDER — CEFAZOLIN SODIUM 1 G/3ML
1 INJECTION, POWDER, FOR SOLUTION INTRAMUSCULAR; INTRAVENOUS SEE ADMIN INSTRUCTIONS
Status: DISCONTINUED | OUTPATIENT
Start: 2019-05-15 | End: 2019-05-15 | Stop reason: HOSPADM

## 2019-05-15 RX ORDER — DEXAMETHASONE SODIUM PHOSPHATE 4 MG/ML
INJECTION, SOLUTION INTRA-ARTICULAR; INTRALESIONAL; INTRAMUSCULAR; INTRAVENOUS; SOFT TISSUE PRN
Status: DISCONTINUED | OUTPATIENT
Start: 2019-05-15 | End: 2019-05-15

## 2019-05-15 RX ORDER — ONDANSETRON 2 MG/ML
4 INJECTION INTRAMUSCULAR; INTRAVENOUS EVERY 6 HOURS PRN
Status: DISCONTINUED | OUTPATIENT
Start: 2019-05-15 | End: 2019-05-18 | Stop reason: HOSPADM

## 2019-05-15 RX ORDER — LIDOCAINE 40 MG/G
CREAM TOPICAL
Status: DISCONTINUED | OUTPATIENT
Start: 2019-05-15 | End: 2019-05-15

## 2019-05-15 RX ORDER — LABETALOL 20 MG/4 ML (5 MG/ML) INTRAVENOUS SYRINGE
10
Status: DISCONTINUED | OUTPATIENT
Start: 2019-05-15 | End: 2019-05-15 | Stop reason: HOSPADM

## 2019-05-15 RX ORDER — HYDROMORPHONE HYDROCHLORIDE 1 MG/ML
.3-.5 INJECTION, SOLUTION INTRAMUSCULAR; INTRAVENOUS; SUBCUTANEOUS EVERY 5 MIN PRN
Status: DISCONTINUED | OUTPATIENT
Start: 2019-05-15 | End: 2019-05-15 | Stop reason: HOSPADM

## 2019-05-15 RX ORDER — LANOLIN 100 %
OINTMENT (GRAM) TOPICAL
Status: DISCONTINUED | OUTPATIENT
Start: 2019-05-15 | End: 2019-05-18 | Stop reason: HOSPADM

## 2019-05-15 RX ORDER — AMOXICILLIN 250 MG
2 CAPSULE ORAL 2 TIMES DAILY PRN
Status: DISCONTINUED | OUTPATIENT
Start: 2019-05-15 | End: 2019-05-18 | Stop reason: HOSPADM

## 2019-05-15 RX ORDER — SODIUM CHLORIDE, SODIUM LACTATE, POTASSIUM CHLORIDE, CALCIUM CHLORIDE 600; 310; 30; 20 MG/100ML; MG/100ML; MG/100ML; MG/100ML
INJECTION, SOLUTION INTRAVENOUS CONTINUOUS PRN
Status: DISCONTINUED | OUTPATIENT
Start: 2019-05-15 | End: 2019-05-15

## 2019-05-15 RX ORDER — ONDANSETRON 4 MG/1
4 TABLET, ORALLY DISINTEGRATING ORAL EVERY 30 MIN PRN
Status: DISCONTINUED | OUTPATIENT
Start: 2019-05-15 | End: 2019-05-15 | Stop reason: HOSPADM

## 2019-05-15 RX ORDER — ACETAMINOPHEN 325 MG/1
975 TABLET ORAL EVERY 8 HOURS
Status: COMPLETED | OUTPATIENT
Start: 2019-05-15 | End: 2019-05-18

## 2019-05-15 RX ORDER — DEXTROSE, SODIUM CHLORIDE, SODIUM LACTATE, POTASSIUM CHLORIDE, AND CALCIUM CHLORIDE 5; .6; .31; .03; .02 G/100ML; G/100ML; G/100ML; G/100ML; G/100ML
INJECTION, SOLUTION INTRAVENOUS CONTINUOUS
Status: DISCONTINUED | OUTPATIENT
Start: 2019-05-15 | End: 2019-05-18 | Stop reason: HOSPADM

## 2019-05-15 RX ORDER — METHYLERGONOVINE MALEATE 0.2 MG/ML
200 INJECTION INTRAVENOUS
Status: DISCONTINUED | OUTPATIENT
Start: 2019-05-15 | End: 2019-05-18 | Stop reason: HOSPADM

## 2019-05-15 RX ORDER — EPHEDRINE SULFATE 50 MG/ML
5 INJECTION, SOLUTION INTRAMUSCULAR; INTRAVENOUS; SUBCUTANEOUS
Status: DISCONTINUED | OUTPATIENT
Start: 2019-05-15 | End: 2019-05-15

## 2019-05-15 RX ORDER — NALOXONE HYDROCHLORIDE 0.4 MG/ML
.1-.4 INJECTION, SOLUTION INTRAMUSCULAR; INTRAVENOUS; SUBCUTANEOUS
Status: DISCONTINUED | OUTPATIENT
Start: 2019-05-15 | End: 2019-05-18 | Stop reason: HOSPADM

## 2019-05-15 RX ORDER — CITRIC ACID/SODIUM CITRATE 334-500MG
30 SOLUTION, ORAL ORAL
Status: COMPLETED | OUTPATIENT
Start: 2019-05-15 | End: 2019-05-15

## 2019-05-15 RX ORDER — BUPIVACAINE HYDROCHLORIDE 2.5 MG/ML
INJECTION, SOLUTION EPIDURAL; INFILTRATION; INTRACAUDAL PRN
Status: DISCONTINUED | OUTPATIENT
Start: 2019-05-15 | End: 2019-05-15

## 2019-05-15 RX ADMIN — BUPIVACAINE 20 ML: 13.3 INJECTION, SUSPENSION, LIPOSOMAL INFILTRATION at 10:09

## 2019-05-15 RX ADMIN — MORPHINE SULFATE 0.15 MG: 1 INJECTION, SOLUTION EPIDURAL; INTRATHECAL; INTRAVENOUS at 08:44

## 2019-05-15 RX ADMIN — BUPIVACAINE HYDROCHLORIDE 10 ML: 2.5 INJECTION, SOLUTION EPIDURAL; INFILTRATION; INTRACAUDAL at 10:09

## 2019-05-15 RX ADMIN — OXYTOCIN-SODIUM CHLORIDE 0.9% IV SOLN 30 UNIT/500ML 100 ML/HR: 30-0.9/5 SOLUTION at 12:36

## 2019-05-15 RX ADMIN — DEXAMETHASONE SODIUM PHOSPHATE 4 MG: 4 INJECTION, SOLUTION INTRA-ARTICULAR; INTRALESIONAL; INTRAMUSCULAR; INTRAVENOUS; SOFT TISSUE at 08:55

## 2019-05-15 RX ADMIN — SODIUM CHLORIDE, POTASSIUM CHLORIDE, SODIUM LACTATE AND CALCIUM CHLORIDE: 600; 310; 30; 20 INJECTION, SOLUTION INTRAVENOUS at 08:28

## 2019-05-15 RX ADMIN — PHENYLEPHRINE HYDROCHLORIDE 0.5 MCG/KG/MIN: 10 INJECTION, SOLUTION INTRAMUSCULAR; INTRAVENOUS; SUBCUTANEOUS at 08:44

## 2019-05-15 RX ADMIN — FENTANYL CITRATE 10 MCG: 50 INJECTION, SOLUTION INTRAMUSCULAR; INTRAVENOUS at 08:44

## 2019-05-15 RX ADMIN — ACETAMINOPHEN 975 MG: 325 TABLET, FILM COATED ORAL at 13:25

## 2019-05-15 RX ADMIN — SIMETHICONE CHEW TAB 80 MG 80 MG: 80 TABLET ORAL at 19:52

## 2019-05-15 RX ADMIN — KETOROLAC TROMETHAMINE 30 MG: 30 INJECTION, SOLUTION INTRAMUSCULAR at 15:55

## 2019-05-15 RX ADMIN — BUPIVACAINE HYDROCHLORIDE IN DEXTROSE 1.6 ML: 7.5 INJECTION, SOLUTION SUBARACHNOID at 08:44

## 2019-05-15 RX ADMIN — ONDANSETRON 4 MG: 2 INJECTION INTRAMUSCULAR; INTRAVENOUS at 09:04

## 2019-05-15 RX ADMIN — SIMETHICONE CHEW TAB 80 MG 80 MG: 80 TABLET ORAL at 13:25

## 2019-05-15 RX ADMIN — KETOROLAC TROMETHAMINE 30 MG: 30 INJECTION, SOLUTION INTRAMUSCULAR at 21:30

## 2019-05-15 RX ADMIN — KETOROLAC TROMETHAMINE 30 MG: 30 INJECTION, SOLUTION INTRAMUSCULAR at 09:22

## 2019-05-15 RX ADMIN — ACETAMINOPHEN 975 MG: 325 TABLET, FILM COATED ORAL at 21:30

## 2019-05-15 RX ADMIN — OXYTOCIN-SODIUM CHLORIDE 0.9% IV SOLN 30 UNIT/500ML 300 ML/HR: 30-0.9/5 SOLUTION at 09:03

## 2019-05-15 RX ADMIN — SODIUM CHLORIDE, POTASSIUM CHLORIDE, SODIUM LACTATE AND CALCIUM CHLORIDE 1000 ML: 600; 310; 30; 20 INJECTION, SOLUTION INTRAVENOUS at 07:49

## 2019-05-15 RX ADMIN — SODIUM CITRATE AND CITRIC ACID MONOHYDRATE 30 ML: 500; 334 SOLUTION ORAL at 07:49

## 2019-05-15 RX ADMIN — SENNOSIDES AND DOCUSATE SODIUM 1 TABLET: 8.6; 5 TABLET ORAL at 19:52

## 2019-05-15 ASSESSMENT — ENCOUNTER SYMPTOMS: SEIZURES: 0

## 2019-05-15 ASSESSMENT — MIFFLIN-ST. JEOR: SCORE: 1480.19

## 2019-05-15 NOTE — ANESTHESIA PROCEDURE NOTES
Spinal/LP Procedure Note    Spinal Block  Staff:     Anesthesiologist:  Kenia Calderon MD    Resident/CRNA:  Ga Mckee MD    Spinal/LP performed by resident/CRNA in presence of a teaching physician.    Location: OB and In OR BEFORE Induction  Procedure Start/Stop Times:     patient identified, IV checked, site marked, risks and benefits discussed, informed consent, monitors and equipment checked, pre-op evaluation, at physician/surgeon's request and post-op pain management      Correct Patient: Yes      Correct Position: Yes      Correct Site: Yes      Correct Procedure: Yes      Correct Laterality:  Yes    Site Marked:  Yes  Procedure:     Procedure:  Intrathecal    ASA:  1    Position:  Sitting    Insertion site:  L3-4    Approach:  Midline    amount (ml):  2    Introducer used: Yes      Introducer gauge:  20 G    Attempts:  1    Redirects:  1    CSF:  Clear    Paresthesias:  No  Assessment/Narrative:     Sensory Level:  T4

## 2019-05-15 NOTE — PROVIDER NOTIFICATION
05/15/19 1336   Provider Notification   Provider Name/Title G 2 resident    Method of Notification Electronic Page   Request Evaluate-Remote   Notification Reason Status Update   urine output of 50 ml.

## 2019-05-15 NOTE — DISCHARGE SUMMARY
[facility] Discharge Summary    Lindsey Giles MRN# [id number]   Age: 29 year old YOB: 1990     Date of Admission:  5/15/2019  Date of Discharge:  19   Admitting Physician:  Liz Hicks MD  Discharge Physician:  Judie Mcclelland MD    Admit Dx:   - Intrauterine pregnancy at 39w0d  - Breech/oblique presentation; unstable lie    Discharge Dx:  - Same as above, s/p primary low transverse  section    Procedures:  - Primary low transverse  section with double layer uterine closure via Pfannenstiel incision  - Spinal analgesia    Admit HPI:  Lindsey Giles is a 29 year old  at 39w0d admitted for scheduled primary  for breech presentation and unstable lie.  The risks, benefits, and alternatives of  section were discussed with the patient, and she agreed to proceed.     Please see her admit H&P for full details of her PMH, PSH, Meds, Allergies and exam on admit.    Operative Course:  Surgery was uncomplicated. EBL from the delivery was 1272. Please see her  Section Operative Note for full details regarding her delivery.    Operative Findings:      1. Clear amniotic fluid  2. Liveborn male infant in footling breech presentation. Apgars 8 at 1 minute & 9 at 5 minutes. Weight 7 lbs 11 oz.  3. Normal uterus, fallopian tubes, and ovaries.     Postoperative Course:  Her postoperative course was uncomplicated. On POD#3, she was meeting all of her postpartum goals and deemed stable for discharge. She was voiding without difficulty, tolerating a regular diet without nausea and vomiting, her pain was well controlled on oral pain medicines and her lochia was appropriate. Her hemoglobin prior to delivery was 11.7 and after delivery was 9.6. Her Rh status was positive and Rhogam was not indicated.    Discharge Medications:     Review of your medicines      START taking      Dose / Directions   acetaminophen 325 MG tablet  Commonly known  as:  TYLENOL      Dose:  650 mg  Take 2 tablets (650 mg) by mouth every 4 hours as needed for mild pain  Quantity:  100 tablet  Refills:  0     ibuprofen 800 MG tablet  Commonly known as:  ADVIL/MOTRIN      Dose:  800 mg  Take 1 tablet (800 mg) by mouth every 6 hours as needed for other (cramping)  Quantity:  40 tablet  Refills:  0     oxyCODONE 5 MG tablet  Commonly known as:  ROXICODONE      Dose:  5-10 mg  Take 1-2 tablets (5-10 mg) by mouth every 3 hours as needed for breakthrough pain  Quantity:  10 tablet  Refills:  0     senna-docusate 8.6-50 MG tablet  Commonly known as:  SENOKOT-S/PERICOLACE      Dose:  1 tablet  Take 1 tablet by mouth 2 times daily as needed for constipation  Quantity:  100 tablet  Refills:  0        CONTINUE these medicines which have NOT CHANGED      Dose / Directions   cholecalciferol 2000 units Caps  Used for:  Prenatal care, subsequent pregnancy, unspecified trimester      Dose:  2000 Units  Take 2,000 Units by mouth daily Take one capsule daily.  Refills:  0     cyanocobalamin 1000 MCG tablet  Commonly known as:  VITAMIN B-12  Used for:  Antepartum anemia      Dose:  1000 mcg  Take 1 tablet (1,000 mcg) by mouth daily  Quantity:  90 tablet  Refills:  3     iron 325 (65 Fe) MG tablet  Used for:  Antepartum anemia      Dose:  1 tablet  Take 1 tablet by mouth daily  Quantity:  60 tablet  Refills:  3     Prenatal Vitamins 0.8 MG Tabs  Used for:  Prenatal care, subsequent pregnancy, unspecified trimester      Quantity:  30 tablet  Refills:  0     vitamin C 250 MG tablet  Commonly known as:  ASCORBIC ACID  Used for:  Antepartum anemia      Dose:  250 mg  Take 1 tablet (250 mg) by mouth daily  Quantity:  90 tablet  Refills:  3        STOP taking    aspirin 81 MG EC tablet  Commonly known as:  ASA              Where to get your medicines      These medications were sent to [facility] [place], [place] - [address]  [address] [address]    Phone:   [phone]     acetaminophen 325 MG tablet    ibuprofen 800 MG tablet    senna-docusate 8.6-50 MG tablet     Some of these will need a paper prescription and others can be bought over the counter. Ask your nurse if you have questions.    Bring a paper prescription for each of these medications    oxyCODONE 5 MG tablet       Discharge/Disposition:  Lindsey Giles was discharged to home in stable condition with the following instructions/medications:  1) Call for temperature > 100.4, bright red vaginal bleeding >1 pad an hour x 2 hours, foul smelling vaginal discharge, pain not controlled by usual oral pain meds, persistent nausea and vomiting not controlled on medications, drainage or redness from incision site  2) She desired nothing for contraception.  3) For feeding she decided to breastfeed.  4) She was instructed to follow-up with her primary OB in 6 weeks for a routine postpartum visit.  5) Discharge activity:  No heavy lifting >15 lbs or strenuous activity for 6 weeks, pelvic rest for 6 weeks, no driving or operating machinery while on narcotics.    Ana Higginbotham   OBGYN Resident PGY3  05/18/2019 8:35 AM     OB/GYN Staff -- Pt seen and examined by me. Agree with note as above.  MD Izabel

## 2019-05-15 NOTE — PLAN OF CARE
No change of condition. Patient denies pain. Patient had low urine output since arriving to [facility], encouraged patient to drink fluids and MD was notified and will evaluate output and notify MD.

## 2019-05-15 NOTE — ANESTHESIA CARE TRANSFER NOTE
Patient: Lindsey Giles    Procedure(s):  Primary  Section    Diagnosis: Breech Presentation  Diagnosis Additional Information: No value filed.    Anesthesia Type:   No value filed.     Note:  Airway :Room Air  Patient transferred to:PACU  Comments: Report to ob, pacu, RN.  Pt stable and comfortable, room air.Handoff Report: Identifed the Patient, Identified the Reponsible Provider, Reviewed the pertinent medical history, Discussed the surgical course, Reviewed Intra-OP anesthesia mangement and issues during anesthesia, Set expectations for post-procedure period and Allowed opportunity for questions and acknowledgement of understanding      Vitals: (Last set prior to Anesthesia Care Transfer)    CRNA VITALS  5/15/2019 0917 - 5/15/2019 0948      5/15/2019             Pulse:  84    SpO2:  97 %                Electronically Signed By: Ga Barnes MD  May 15, 2019  9:48 AM

## 2019-05-15 NOTE — PLAN OF CARE
[facility] for scheduled primary C/S for breech. VS stable. Positive fetal movement. Reactive NST. PIV placed. Procedure explained to patient. Questions answered. Consents signed. Patient prepped for surgery.

## 2019-05-15 NOTE — PLAN OF CARE
Data: Lindsey Giles transferred to 7-137 via cart at 1130. Baby transferred via patient's arms.  Action: Receiving unit notified of transfer: Yes. Patient and family notified of room change. Report given to KIMANI Mcgill at 1105. Belongings sent to receiving unit. Accompanied by Registered Nurse. Oriented patient to surroundings. Call light within reach. ID bands double-checked with receiving RN.  Response: Patient tolerated transfer and is stable.

## 2019-05-15 NOTE — PROVIDER NOTIFICATION
05/15/19 1443   Provider Notification   Provider Name/Title G 2 resident    Method of Notification Electronic Page   Request Evaluate-Remote   Notification Reason Status Update   fyi pt urine output is 30 cc since we last spoke; and pt had 1000 cc intake since admission to [facility].

## 2019-05-15 NOTE — PLAN OF CARE
Patient to OR via ambulatory. Delivered by primary C/S by Dr. Hicks. NICU present for breech. Baby delivered, cord clamping delayed 60 seconds and brought to prewarmed infant warmer. Infant stimulated and dried. Apgars 8/9. Brought to mother after skin to skin bonding.

## 2019-05-15 NOTE — PLAN OF CARE
Patient arrived to [facility] unit via cart at 1145 with belongings, accompanied by RN, and spouse , with infant in arms. Got report from  Shayla HARMAN and checked bands. Unit and room orientation done. No concerns a this time. Continue with plan of care.

## 2019-05-15 NOTE — OP NOTE
[facility]  Full Operative Progress Note     Surgery Date:  5/15/2019  Surgeon:  Liz Hicks MD  Assistants:  Giovanna Wang MD PGY-2    Hailey Cuellar MS4  Pre-op Diagnosis:  1. Intrauterine pregnancy at 39w0d     2. Breech/oblique presentation   Post-op Diagnosis:  1. Same      2. Liveborn male infant   Procedure:  Primary low-transverse  section with double layer uterine closure via Pfannenstiel incision    Anesthesia: spinal  QBL:  1264 mL  IVF:  850 mL crystalloid  UOP:  550 mL clear urine at the end of the case  Drains: Santa Catheter   Specimens:  Cord blood, cord gases  Complications: None   Indications:   Lindsey Giles is a 29 year old  at 39w0d admitted for scheduled primary  for breech presentation and unstable lie.  The risks, benefits, and alternatives of  section were discussed with the patient, and she agreed to proceed.     Findings:     1. Clear amniotic fluid  2. Liveborn male infant in footling breech presentation. Apgars 8 at 1 minute & 9 at 5 minutes. Weight 7 lbs 11 oz.  Gases pending.  3. Normal uterus, fallopian tubes, and ovaries.     Procedure Details:   The patient was brought to the OR, where adequate spinal anesthesia was administered.  She was placed in the dorsal supine position with a slight leftward tilt. She was prepped and draped in the usual sterile fashion. A surgical time out was performed. A pfannenstiel skin incision was made with the scalpel, and carried down to the underlying fascia with sharp and blunt dissection. The fascia was incised in the midline, and the incision was extended laterally with the Figueroa scissors. The superior aspect of the fascia was grasped with the Kocher clamps and dissected off of the underlying rectus muscles with blunt and sharp dissection. Attention was then turned to the inferior aspect of the fascia, which was similarly dissected off of the underlying rectus muscles. The rectus muscles  were  in the midline, and the peritoneum was entered bluntly, and the opening was extended with digital pressure. The bladder blade was placed. A transverse hysterotomy was made with the scalpel in the lower uterine segment, and the incision was extended with digital pressure. The infant was noted to be in the footling breech position, and was delivered atraumatically with standard breech maneuvers. The cord was doubly clamped and cut after a one minute delay, and the infant was handed off to the awaiting nursery staff. A segment of cord was cut and handed off. The placenta was delivered with gentle traction on the umbilical cord and uterine massage. The uterus was exteriorized and cleared of all clots and debris. Uterine tone was noted to be firm with pitocin given through the running IV and uterine massage.  The hysterotomy was closed with a running locked suture of 0 Vicryl.  The hysterotomy was then imbricated using an 0 Vicryl suture. The hysterotomy was noted to be hemostatic. The posterior cul-de-sac was cleared of all clots and debris. The uterus was returned to the abdomen. The pericolic gutters were cleared of all clots and debris. The hysterotomy was reexamined and noted to be hemostatic. The fascia and rectus muscles were examined and areas of oozing were controlled with electrocautery. The fascia was closed with a running 0 Vicryl suture. The subcutaneous tissue was irrigated and areas of oozing were controlled with electrocautery. The skin was closed with 4-0 Monocryl and covered with a sterile dressing.    All sponge, needle, and instrument counts were correct. The patient tolerated the procedure well, and was transferred to recovery in stable condition. Dr. Hicks was present and scrubbed for the entirety of the procedure.     Giovanna Wang MD  OB GYN PGY-2  5/15/2019, 9:27 AM    Staff MD Note  I was present and scrubbed for the entire procedure noted above.  I agree with the description  above and any necessary changes have been made by me.  Liz Hicks MD

## 2019-05-16 LAB — HGB BLD-MCNC: 9.6 G/DL (ref 11.7–15.7)

## 2019-05-16 PROCEDURE — 12000001 ZZH R&B MED SURG/OB UMMC

## 2019-05-16 PROCEDURE — 85018 HEMOGLOBIN: CPT | Performed by: STUDENT IN AN ORGANIZED HEALTH CARE EDUCATION/TRAINING PROGRAM

## 2019-05-16 PROCEDURE — 25000132 ZZH RX MED GY IP 250 OP 250 PS 637: Performed by: STUDENT IN AN ORGANIZED HEALTH CARE EDUCATION/TRAINING PROGRAM

## 2019-05-16 PROCEDURE — 36415 COLL VENOUS BLD VENIPUNCTURE: CPT | Performed by: STUDENT IN AN ORGANIZED HEALTH CARE EDUCATION/TRAINING PROGRAM

## 2019-05-16 PROCEDURE — 25000128 H RX IP 250 OP 636: Performed by: STUDENT IN AN ORGANIZED HEALTH CARE EDUCATION/TRAINING PROGRAM

## 2019-05-16 RX ORDER — ACETAMINOPHEN 325 MG/1
650 TABLET ORAL EVERY 4 HOURS PRN
Qty: 100 TABLET | Refills: 0 | Status: SHIPPED | OUTPATIENT
Start: 2019-05-18 | End: 2019-07-17

## 2019-05-16 RX ORDER — AMOXICILLIN 250 MG
1 CAPSULE ORAL 2 TIMES DAILY PRN
Qty: 100 TABLET | Refills: 0 | Status: SHIPPED | OUTPATIENT
Start: 2019-05-16 | End: 2019-06-26

## 2019-05-16 RX ORDER — IBUPROFEN 800 MG/1
800 TABLET, FILM COATED ORAL EVERY 6 HOURS PRN
Qty: 40 TABLET | Refills: 0 | Status: SHIPPED | OUTPATIENT
Start: 2019-05-16 | End: 2019-06-26

## 2019-05-16 RX ADMIN — ACETAMINOPHEN 975 MG: 325 TABLET, FILM COATED ORAL at 05:28

## 2019-05-16 RX ADMIN — SIMETHICONE CHEW TAB 80 MG 80 MG: 80 TABLET ORAL at 21:56

## 2019-05-16 RX ADMIN — KETOROLAC TROMETHAMINE 30 MG: 30 INJECTION, SOLUTION INTRAMUSCULAR at 03:27

## 2019-05-16 RX ADMIN — ACETAMINOPHEN 975 MG: 325 TABLET, FILM COATED ORAL at 21:40

## 2019-05-16 RX ADMIN — SIMETHICONE CHEW TAB 80 MG 80 MG: 80 TABLET ORAL at 03:26

## 2019-05-16 RX ADMIN — IBUPROFEN 800 MG: 800 TABLET ORAL at 16:22

## 2019-05-16 RX ADMIN — SENNOSIDES AND DOCUSATE SODIUM 1 TABLET: 8.6; 5 TABLET ORAL at 08:28

## 2019-05-16 RX ADMIN — SENNOSIDES AND DOCUSATE SODIUM 1 TABLET: 8.6; 5 TABLET ORAL at 13:35

## 2019-05-16 RX ADMIN — OXYCODONE HYDROCHLORIDE 5 MG: 5 TABLET ORAL at 22:49

## 2019-05-16 RX ADMIN — SENNOSIDES AND DOCUSATE SODIUM 1 TABLET: 8.6; 5 TABLET ORAL at 21:40

## 2019-05-16 RX ADMIN — SIMETHICONE CHEW TAB 80 MG 80 MG: 80 TABLET ORAL at 13:36

## 2019-05-16 RX ADMIN — KETOROLAC TROMETHAMINE 30 MG: 30 INJECTION, SOLUTION INTRAMUSCULAR at 10:14

## 2019-05-16 RX ADMIN — ACETAMINOPHEN 975 MG: 325 TABLET, FILM COATED ORAL at 13:35

## 2019-05-16 RX ADMIN — IBUPROFEN 800 MG: 800 TABLET ORAL at 22:39

## 2019-05-16 NOTE — PROGRESS NOTES
"CNM Courtesy Round:    Pt sitting on edge of bed in room. Pt's mother in room holding baby boy \"Michi.\"  is out picking up their son Jerrell from .   Pt states she is feeling well and has been able to ambulate more today. Her pain is well controlled.   She reports that they are very happy with their decision to have a scheduled c/s for fetal unstable lie/breech. Feels that it was less stressful and that everything went really well.   She is thankful and happy with the prenatal, delivery, postpartum care she received.    Plans to follow up pp in clinic with CNM team.    HÉCTOR Chacon, MYRTLE   "

## 2019-05-16 NOTE — PLAN OF CARE
VSS. Afebrile. Up ad sandra. Voiding. Tolerating regular diet. C/o pain and medicated with relief. Breast feeding well. Pt's mother here and supportive. FOB in and out with their other child. Pt attentive to baby's needs. Will continue to monitor.

## 2019-05-16 NOTE — PROGRESS NOTES
"Post  Anesthesia Follow Up Note    Patient: Lindsey Giles    Patient location: Postpartum floor.    Chief complaint: Acute postoperative pain managment    Procedure(s) Performed:   SECTION    Anesthesia type: Spinal Block and transversus abdominus plane (TAP) nerve block        Subjective:   The patient reports good pain control.  Pain Intensity: 4/10 with activity, 3/10 at rest  Patient reports mild pruritus, denies weakness, paresthesia.  Denies numbness, tingling lips, tinnitus, metallic taste, difficulties breathing nausea, vomiting, or difficulty voiding. She is able to ambulate and tolerates regular diet.        Objective:  Respiratory Function (RR / SpO2 / Airway Patency): Satisfactory    Cardiac Function (HR / Rhythm / BP): Satisfactory    Strength and sensation lower extremities: Strength 5/5 and grossly symmetric bilateral LE    Site of spinal/epidural insertion: clean and intact, No tenderness, swelling or erythema    Last Vitals: /62   Pulse 82   Temp 36.5  C (97.7  F) (Oral)   Resp 16   Ht 1.676 m (5' 6\")   Wt 74.8 kg (165 lb)   LMP 08/15/2018   SpO2 98%   Breastfeeding? Unknown   BMI 26.63 kg/m        Assessment and Plan:   Lindsey Giles is a 29 year old female POD #1 s/p   SECTION with spinal 0.15mg morphine, Fentanyl 0.10 mg IT and single shot TAP nerve block injections bupivacaine 0.75% with epinephrine 1:200,000 1.6 mL, then liposome bupivacaine (Exparel) long-acting 1.3% 20mL given on 5/15/2019 for postoperative analgesia.  Pt is ambulating with minimal difficulty, no weakness or paresthesias.  No evidence of adverse side effects associated with spinal and nerve block injections. Pt is receiving adequate incisional pain control.  Anticipate up to 72 hours of incisional pain control.  Anticipate patient will require opioid/nonopioid analgesics for visceral and muscle pain not controlled with local anesthetic.      - NO other local anesthetic use within 96 " hours of liposome bupivacaine (Exparel) long acting  - patient received verbal and written instructions about liposome bupivacaine   - please call if questions or concerns  - discussed plan with attending anesthesiologist      Ga Mckee M.D.  [facility]  5/16/2019 9:32 AM    If questions or concerns, please contact OB Anesthesiologist  Phone [phone]

## 2019-05-16 NOTE — PLAN OF CARE
Pt vital signs and assessment findings normal. Pain managed with Toradol and Tylenol. Bleeding light, fundus firm and at U3. Able to bear weight and ambulate with standby. Pneumatic compressions in place. Santa to be removed prior to end of shift. Bonding well with baby, breastfeeding independently. Incision dressing clean, dry, and intact. Supported by spouse Bucky at bedside.

## 2019-05-16 NOTE — PLAN OF CARE
VSS, minimal lochia.  Patient states discomfort is well managed with scheduled tylenol and toradol, and is aware that oxicodone is available. Ambulated to bathroom for hygiene and tolerated well.  is helpful and supportive. Bonding well with baby.

## 2019-05-16 NOTE — PROGRESS NOTES
[facility] Obstetrics   Postpartum Progress Note    Name: Lindsey Giles  : 1990  MRN: [id number]    POD#1 s/p PLTCS    S: Patient is resting comfortably.  Pain is improving and well controlled.  Bleeding is light.  Voiding spontaneously.  Ambulating without dizziness.  Tolerating a regular diet without nausea/vomiting.  Denies fevers/chills, headache, CP, SOB.  She is breast feeding.    O:  Patient Vitals for the past 24 hrs:   BP Temp Temp src Pulse Heart Rate Resp SpO2   19 0400 104/64 97.9  F (36.6  C) Oral -- 78 -- 98 %   19 0015 99/50 98.4  F (36.9  C) Oral -- 74 16 97 %   05/15/19 2015 96/60 98  F (36.7  C) Oral -- 87 16 97 %   05/15/19 1555 125/75 97.9  F (36.6  C) Oral 71 -- 18 97 %   05/15/19 1500 109/74 98.1  F (36.7  C) Oral 69 -- 16 98 %   05/15/19 1437 116/71 -- -- 69 -- 18 99 %   05/15/19 1318 111/72 97.8  F (36.6  C) Oral -- 87 20 96 %   05/15/19 1218 115/64 -- Oral -- 84 16 96 %   05/15/19 1150 105/63 97.9  F (36.6  C) Oral -- 71 18 96 %   05/15/19 1130 116/68 -- -- 71 81 8 97 %   05/15/19 1115 119/70 -- -- -- 70 20 97 %   05/15/19 1100 116/85 97.8  F (36.6  C) Oral -- 77 24 97 %   05/15/19 1045 121/82 -- -- -- 81 10 96 %   05/15/19 1030 121/71 -- -- -- 86 8 95 %   05/15/19 1015 115/68 -- -- -- 108 19 98 %   05/15/19 1000 110/70 -- -- -- 89 8 98 %   05/15/19 0945 109/67 98.4  F (36.9  C) Oral 87 95 24 98 %     Gen: NAD. Alert, oriented. Resting comfortably in bed.  CV: regular rate  Resp:  no increased work of breathing  Abd: Soft, appropriately tender, fundus firm at the umbilicus, non-distended  Incision: Bandage in place, will remove later today  Ext: trace lower extremity edema bilaterally     Labs:   HGB  Recent Labs   Lab 19  0608 05/15/19  0710   HGB 9.6* 11.7     A/P: Lindsey Giles is a 29 year old  female, now POD#1 s/p PLTCS for breech presentation.  Pregnancy was complicated by gTCP (Plt 141k on admit).  Stable in the postoperative period.       #PostpartumCare  - Continue routine postoperative management  - Rh+, Rubella immune, Hep BSAg NR  - Pain: Continue ibuprofen/Tylenol scheduled.  Roxicodone PRN for breakthrough.  - ABLA: HGB 11.7> QBL 1264mL> 9.6, asymptomatic. Will discharge with PO Fe d/t Hgb <10.  - Incision: bandage in place, will remove later today  - FEN/GI: regular diet, stool softeners PRN  - Santa removed, needs TOV  - Contraception: did not discuss  - Breastfeeding  - Baby doing well    Dispo: anticipate discharge to home on POD#2-3.    Ruba Bill MD  OBGYN PGY2  05/16/2019 6:54 AM     I have seen and examined the patient without the resident. I have reviewed, edited, and agree with the note.   My findings are:Appears well, breast feeding  Abdomen: soft, NT, ND.   Incision CDI   Hemoglobin   Date Value Ref Range Status   05/16/2019 9.6 (L) 11.7 - 15.7 g/dL Final   05/15/2019 11.7 11.7 - 15.7 g/dL Final     Acute blood loss anemia: asymptomatic and expected.     Sandra Her MD

## 2019-05-17 PROCEDURE — 25000132 ZZH RX MED GY IP 250 OP 250 PS 637: Performed by: STUDENT IN AN ORGANIZED HEALTH CARE EDUCATION/TRAINING PROGRAM

## 2019-05-17 PROCEDURE — 12000001 ZZH R&B MED SURG/OB UMMC

## 2019-05-17 RX ORDER — OXYCODONE HYDROCHLORIDE 5 MG/1
5-10 TABLET ORAL
Qty: 10 TABLET | Refills: 0 | Status: SHIPPED | OUTPATIENT
Start: 2019-05-17 | End: 2019-06-26

## 2019-05-17 RX ADMIN — OXYCODONE HYDROCHLORIDE 5 MG: 5 TABLET ORAL at 15:28

## 2019-05-17 RX ADMIN — OXYCODONE HYDROCHLORIDE 5 MG: 5 TABLET ORAL at 12:27

## 2019-05-17 RX ADMIN — SENNOSIDES AND DOCUSATE SODIUM 2 TABLET: 8.6; 5 TABLET ORAL at 08:23

## 2019-05-17 RX ADMIN — SENNOSIDES AND DOCUSATE SODIUM 2 TABLET: 8.6; 5 TABLET ORAL at 19:02

## 2019-05-17 RX ADMIN — IBUPROFEN 800 MG: 800 TABLET ORAL at 23:44

## 2019-05-17 RX ADMIN — OXYCODONE HYDROCHLORIDE 5 MG: 5 TABLET ORAL at 19:02

## 2019-05-17 RX ADMIN — ACETAMINOPHEN 975 MG: 325 TABLET, FILM COATED ORAL at 21:27

## 2019-05-17 RX ADMIN — SIMETHICONE CHEW TAB 80 MG 80 MG: 80 TABLET ORAL at 17:25

## 2019-05-17 RX ADMIN — OXYCODONE HYDROCHLORIDE 5 MG: 5 TABLET ORAL at 05:53

## 2019-05-17 RX ADMIN — OXYCODONE HYDROCHLORIDE 5 MG: 5 TABLET ORAL at 22:35

## 2019-05-17 RX ADMIN — SIMETHICONE CHEW TAB 80 MG 80 MG: 80 TABLET ORAL at 10:50

## 2019-05-17 RX ADMIN — IBUPROFEN 800 MG: 800 TABLET ORAL at 10:44

## 2019-05-17 RX ADMIN — SIMETHICONE CHEW TAB 80 MG 80 MG: 80 TABLET ORAL at 04:35

## 2019-05-17 RX ADMIN — IBUPROFEN 800 MG: 800 TABLET ORAL at 17:25

## 2019-05-17 RX ADMIN — ACETAMINOPHEN 975 MG: 325 TABLET, FILM COATED ORAL at 05:53

## 2019-05-17 RX ADMIN — ACETAMINOPHEN 975 MG: 325 TABLET, FILM COATED ORAL at 13:37

## 2019-05-17 RX ADMIN — OXYCODONE HYDROCHLORIDE 5 MG: 5 TABLET ORAL at 08:58

## 2019-05-17 RX ADMIN — OXYCODONE HYDROCHLORIDE 5 MG: 5 TABLET ORAL at 02:16

## 2019-05-17 RX ADMIN — SIMETHICONE CHEW TAB 80 MG 80 MG: 80 TABLET ORAL at 23:44

## 2019-05-17 RX ADMIN — IBUPROFEN 800 MG: 800 TABLET ORAL at 04:35

## 2019-05-17 NOTE — PLAN OF CARE
Data: Vital signs within normal limits. Postpartum checks within normal limits - see flow record. Patient eating and drinking normally. Patient able to empty bladder independently and is up ambulating. No apparent signs of infection. Incision covered and dressing is clean, dry, and intact. Patient performing self cares and is able to care for infant.  Action: Patient medicated during the shift for pain. See MAR. Patient reassessed within 1 hour after each medication and pain was improved - patient stated she was comfortable. Patient education done about pain medications, breastfeeding, and treatment plan. See flow record.  Response: Positive attachment behaviors observed with infant. Support persons present.   Plan: Will continue plan of care.

## 2019-05-17 NOTE — ANESTHESIA POSTPROCEDURE EVALUATION
Anesthesia POST Procedure Evaluation    Patient: Lindsey Giles   MRN:     [id number] Gender:   female   Age:    29 year old :      1990        Preoperative Diagnosis: Breech Presentation   Procedure(s):  Primary  Section   Postop Comments: No value filed.       Anesthesia Type:  Regional, MAC  Spinal    Reportable Event: NO     PAIN: Uncomplicated   Sign Out status: Comfortable, Well controlled pain     PONV: No PONV   Sign Out status:  No Nausea or Vomiting     Neuro/Psych: Uneventful perioperative course   Sign Out Status: Preoperative baseline; Age appropriate mentation     Airway/Resp.: Uneventful perioperative course   Sign Out Status: Non labored breathing, age appropriate RR; Resp. Status within EXPECTED Parameters     CV: Uneventful perioperative course   Sign Out status: Appropriate BP and perfusion indices; Appropriate HR/Rhythm     Disposition:   Sign Out in:  Labor and Delivery  Disposition:  Labor and Delivery  Recovery Course: Uneventful  Follow-Up: Not required           Last Anesthesia Record Vitals:  CRNA VITALS  5/15/2019 0917 - 5/15/2019 1017      5/15/2019             NIBP:  108/67    Pulse:  84          Last PACU Vitals:  Vitals Value Taken Time   /62 2019 12:00 AM   Temp 36.5  C (97.7  F) 2019 12:00 AM   Pulse 98 2019  1:00 PM   Resp 16 2019 12:00 AM   SpO2 98 % 2019  8:18 AM   Temp src     NIBP     Pulse     SpO2     Resp     Temp     Ht Rate     Temp 2           Electronically Signed By: Kenia Jones MD, May 17, 2019, 8:48 AM

## 2019-05-17 NOTE — PLAN OF CARE
Data: Vital signs within normal limits. Postpartum checks within normal limits - see flow record. Patient eating and drinking normally. Patient able to empty bladder independently and is up ambulating. No apparent signs of infection. Incision healing well. Patient performing self cares and is able to care for infant.  Action: Patient medicated during the shift for pain. See MAR. Patient reassessed within 1 hour after each medication and pain was improved - patient stated she was comfortable. Patient education done about bath, assessments, pain management. See flow record.  Response: Positive attachment behaviors observed with infant. Support persons Bucky present.   Plan: Anticipate discharge on tomorrow.

## 2019-05-17 NOTE — PROGRESS NOTES
[facility] Obstetrics   Postpartum Progress Note    Name: Lindsey Giles  : 1990  MRN: [id number]    POD#2 s/p PLTCS    S: Patient is resting comfortably. Pain is improving and well controlled.  Bleeding is light. Passing flatus and voiding spontaneously.  Ambulating without dizziness. Tolerating a regular diet without nausea/vomiting.  Denies fevers/chills, CP, SOB.  She is breast feeding.    O:  Patient Vitals for the past 24 hrs:   BP Temp Temp src Pulse Heart Rate Resp SpO2   19 0000 106/62 97.7  F (36.5  C) Oral -- 79 16 --   19 2139 105/62 98.4  F (36.9  C) Oral -- 74 16 --   19 1300 102/68 97.6  F (36.4  C) Oral 98 -- 16 --   19 0818 100/62 97.7  F (36.5  C) Oral 82 -- 16 98 %     Gen: NAD. Alert, oriented. Resting comfortably in bed.  CV: regular rate  Resp:  no increased work of breathing  Abd: Soft, appropriately tender, fundus firm at the umbilicus, non-distended  Incision: C/D/I, steristrips in place  Ext: trace lower extremity edema bilaterally     Labs:   HGB  Recent Labs   Lab 19  0608 05/15/19  0710   HGB 9.6* 11.7     A/P: Lindsey Giles is a 29 year old  female, now POD#2 s/p PLTCS for breech presentation.  Pregnancy was complicated by gTCP (Plt 141k on admit).  Stable in the postoperative period.      #PostpartumCare  - Continue routine postoperative management  - Rh+, Rubella immune, Hep BSAg NR  - Pain: Continue ibuprofen/Tylenol scheduled.  Roxicodone PRN for breakthrough.  - ABLA: HGB 11.7> QBL 1264mL> 9.6, asymptomatic. Will discharge with PO Fe d/t Hgb <10.  - Incision: C/D/I  - FEN/GI: regular diet, stool softeners PRN  - Santa removed, needs TOV  - Contraception: declined  - Breastfeeding  - Baby doing well    Dispo: anticipate discharge to home on POD#2-3.    Ruba Bill MD  OBGYN PGY2  2019 6:44 AM     [facility] staff:  Appreciate note by Dr. Bill.  I have seen and examined the patient without the resident. I have reviewed,  edited, and agree with the note.      Myra Cole MD, FACOG  5/17/2019  11:00 AM

## 2019-05-17 NOTE — PLAN OF CARE
VSS. Voiding adequate amounts w/o difficulty. Incision UTV pt will shower in the am and remove the dressing. Bonding well with . Spouse present and supportive. Pt given simethicone for gas pain. Pain well managed with current pain regimen. Breastfeeding independently.

## 2019-05-18 VITALS
HEART RATE: 82 BPM | WEIGHT: 165 LBS | HEIGHT: 66 IN | DIASTOLIC BLOOD PRESSURE: 78 MMHG | SYSTOLIC BLOOD PRESSURE: 114 MMHG | OXYGEN SATURATION: 98 % | TEMPERATURE: 97.7 F | RESPIRATION RATE: 18 BRPM | BODY MASS INDEX: 26.52 KG/M2

## 2019-05-18 PROCEDURE — 25000132 ZZH RX MED GY IP 250 OP 250 PS 637: Performed by: STUDENT IN AN ORGANIZED HEALTH CARE EDUCATION/TRAINING PROGRAM

## 2019-05-18 RX ORDER — POLYETHYLENE GLYCOL 3350 17 G/17G
17 POWDER, FOR SOLUTION ORAL DAILY
Status: DISCONTINUED | OUTPATIENT
Start: 2019-05-18 | End: 2019-05-18 | Stop reason: HOSPADM

## 2019-05-18 RX ADMIN — IBUPROFEN 800 MG: 800 TABLET ORAL at 05:39

## 2019-05-18 RX ADMIN — SIMETHICONE CHEW TAB 80 MG 80 MG: 80 TABLET ORAL at 09:34

## 2019-05-18 RX ADMIN — ACETAMINOPHEN 975 MG: 325 TABLET, FILM COATED ORAL at 05:39

## 2019-05-18 RX ADMIN — OXYCODONE HYDROCHLORIDE 5 MG: 5 TABLET ORAL at 06:45

## 2019-05-18 RX ADMIN — OXYCODONE HYDROCHLORIDE 5 MG: 5 TABLET ORAL at 10:55

## 2019-05-18 RX ADMIN — SENNOSIDES AND DOCUSATE SODIUM 2 TABLET: 8.6; 5 TABLET ORAL at 09:34

## 2019-05-18 RX ADMIN — POLYETHYLENE GLYCOL 3350 17 G: 17 POWDER, FOR SOLUTION ORAL at 09:33

## 2019-05-18 NOTE — PROGRESS NOTES
[facility] Obstetrics   Postpartum Progress Note    Name: Lindsey Giles  : 1990  MRN: [id number]    POD#2 s/p PLTCS    S: Patient is resting comfortably. Pain is improving and well controlled.  Bleeding is light. Passing flatus and voiding spontaneously.  Ambulating without dizziness. Tolerating a regular diet without nausea/vomiting.  Denies fevers/chills, CP, SOB.  She is breast feeding. Ready for discharge.    O:  Patient Vitals for the past 24 hrs:   BP Temp Temp src Heart Rate Resp   19 0030 110/60 98  F (36.7  C) Oral 68 16   19 1521 115/66 97.6  F (36.4  C) Oral 72 16     Gen: NAD. Alert, oriented. Resting comfortably in bed.  CV: regular rate  Resp:  no increased work of breathing  Abd: Soft, appropriately tender, fundus firm at the umbilicus, non-distended  Incision: C/D/I, steristrips in place  Ext: trace lower extremity edema bilaterally     Labs:   HGB  Recent Labs   Lab 19  0608 05/15/19  0710   HGB 9.6* 11.7     A/P: Lindsey Giles is a 29 year old  female, now POD#3 s/p PLTCS for breech presentation.  Pregnancy was complicated by gestational thrombocytopenia (Plt 141k on admit).  Stable in the postoperative period.      #PostpartumCare  - Continue routine postoperative management  - Rh+, Rubella immune, Hep BSAg NR  - Pain: Continue ibuprofen/Tylenol scheduled.  Roxicodone PRN for breakthrough.  - ABLA: HGB 11.7> QBL 1264mL> 9.6, asymptomatic. Will discharge with PO Fe d/t Hgb <10.  - Incision: C/D/I  - FEN/GI: regular diet, stool softeners PRN  - Santa removed, needs TOV  - Contraception: declined  - Breastfeeding  - Baby doing well    Dispo: discharge home today     Ana Higginbotham MD  OBGYN PGY3  2019 8:35 AM     OB/GYN Staff -- Pt seen and examined by me. Agree with note as above.  MD Izabel

## 2019-05-18 NOTE — PLAN OF CARE
Breastfeeding well, deep latch visualized. Denies nipple pain or breakdown.  Bleeding minimal. Discharge education reviewed. Given discharge meds.  Follow up in 2 weeks reviewed.  Discharge via w/c to [facility] at 1115.

## 2019-05-18 NOTE — PLAN OF CARE
VSS. Postpartum assessment WNL. C/o some incisional discomfort/soreness. Pain well managed with ibuprofen, tylenol, and oxycodone. Simethicone given for gas pain. Pt encouraged to watch educational videos. Breast pump given to pt for home. Breastfeeding independently. Pt reviewed discharge slides on [facility]. Spouse present and attentive.

## 2019-05-18 NOTE — PLAN OF CARE
Vital signs within normal limits. Postpartum checks within normal limits - see flow record. Patient eating and drinking normally. Patient able to empty bladder independently and is up ambulating. No apparent signs of infection. Incision healing well. Patient performing self cares Patient medicated during the shift for pain. See MAR. Patient reassessed within 1 hour after each medication and pain was improved - patient stated she was comfortable. Positive attachment behaviors observed with infant. Support person present.  Anticipate discharge on 5/18/19.

## 2019-05-30 ENCOUNTER — OFFICE VISIT (OUTPATIENT)
Dept: OBGYN | Facility: CLINIC | Age: 29
End: 2019-05-30
Attending: ADVANCED PRACTICE MIDWIFE
Payer: COMMERCIAL

## 2019-05-30 VITALS
WEIGHT: 144.7 LBS | HEART RATE: 71 BPM | DIASTOLIC BLOOD PRESSURE: 69 MMHG | HEIGHT: 66 IN | SYSTOLIC BLOOD PRESSURE: 101 MMHG | BODY MASS INDEX: 23.25 KG/M2

## 2019-05-30 ASSESSMENT — ANXIETY QUESTIONNAIRES
2. NOT BEING ABLE TO STOP OR CONTROL WORRYING: NOT AT ALL
5. BEING SO RESTLESS THAT IT IS HARD TO SIT STILL: NOT AT ALL
6. BECOMING EASILY ANNOYED OR IRRITABLE: NOT AT ALL
1. FEELING NERVOUS, ANXIOUS, OR ON EDGE: NOT AT ALL
3. WORRYING TOO MUCH ABOUT DIFFERENT THINGS: SEVERAL DAYS
7. FEELING AFRAID AS IF SOMETHING AWFUL MIGHT HAPPEN: NOT AT ALL
GAD7 TOTAL SCORE: 2

## 2019-05-30 ASSESSMENT — PATIENT HEALTH QUESTIONNAIRE - PHQ9: 5. POOR APPETITE OR OVEREATING: SEVERAL DAYS

## 2019-05-30 ASSESSMENT — MIFFLIN-ST. JEOR: SCORE: 1388.1

## 2019-05-30 NOTE — NURSING NOTE
SUBJECTIVE:   Lindsey Giles is here for her 6-week postpartum checkup.     PHQ-9 score: 2  Hx of Abuse:  No    Delivery Date: 5/15/2019.    Delivering provider:  Liz Hicks MD.    Type of delivery:  .     Delivery complications: None  Infant gender:  boy, weight 7 pounds 11.5 oz.  Feeding Method:  .  Complications reported with feeding:  none, infant thriving .    Bleeding:  Light.  Duration:  6weeks.  Menses resumed:  No  Bowel/Urinary problems:  No    Contraception Planned:  None -- is she planning pregnancy? no  She  has not had intercourse since delivery..

## 2019-05-30 NOTE — LETTER
"2019       RE: Lindsey Giles   [address]  [place] [address]     Dear Colleague,    Thank you for referring your patient, Lindsey Giles, to the [facility] at [facility]. Please see a copy of my visit note below.    S: Lindsey Giles is a 29 year old  here for a 2 week PP check.     Pt is s/p  scheduled primary low transverse  section on 5/15/2019 d/t breech presentation/unstable lie. Surgery uncomplicated. Baby boy, Birth weight 7lb 1 1.5 oz.     Patient reports she is doing well. Vaginal bleeding has significantly decreased. Feels incision is healing well- states she is \"feeling better every day.\" Has been able to increase activity slowly. Occasionally sore- relieved with tylenol and motrin- has not needed narcotic medication. Normal voiding and bowel movements. Uses stool softener prn. Taking iron, vitamin c and vitamin b12 \"inconsistently\" but attempting to remember supplementation.      Baby boy is healthy. Breastfeeding well. Most recent weight 8lb 6oz. Denies any breast or nipple concerns.      Reports mood is good. She feels well supported by spouse, family and friends.      Undecided on contraception- interested in information today.     Last pap  10/29/2018 NIL, hpv negative.     O: /69 (BP Location: Right arm, Patient Position: Chair)   Pulse 71   Ht 1.676 m (5' 6\")   Wt 65.6 kg (144 lb 11.2 oz)   LMP 08/15/2018   BMI 23.36 kg/m       Constitutional:    Alert and oriented, well developed, in no acute distress.      Exam:  Neck - supple without lymphadenopathy. Normal thyroid.   Lungs - clear to auscultation bilaterally.  Heart - regular rate and rhythm without murmur.  Breasts:  Lactating, Nipples intact with no lesions, Non-tender and No S/S of yeast or mastitis  Abdomen: Benign, Soft, flat, non-tender, No masses, organomegaly and Diastasis less than 1-2 FB  Incision:   clean, dry, well approximated, no s/s " of infection. Steri strips in place.  Uterus:  appropriate involution, non-tender      PHQ9=  2, GAD7=  2     A: (Z39.2) Routine postpartum follow-up  (primary encounter diagnosis)        P:  - Reviewed contraception options. Pt undecided. Educational pamphlets given.    Reviewed recommendations for healthy pregnancy spacing.  Will continue to discuss at 6 week pp visit.  - Continue supplementation with iron, vit c and b12.  - Pap with HPV cotesting due  10/2023.      RTC for 6 week postpartum appointment.     HÉCTOR Chacon, MYRTLE

## 2019-05-31 ASSESSMENT — ANXIETY QUESTIONNAIRES: GAD7 TOTAL SCORE: 2

## 2019-06-09 NOTE — PROGRESS NOTES
"S: Lindsey Giles is a 29 year old  here for a 2 week PP check.     Pt is s/p scheduled primary low transverse  section on 5/15/2019 d/t breech presentation/unstable lie. Surgery uncomplicated. Baby boy, Birth weight 7lb 11.5 oz.     Patient reports she is doing well. Vaginal bleeding has significantly decreased. Feels incision is healing well- states she is \"feeling better every day.\" Has been able to increase activity slowly. Occasionally sore- relieved with tylenol and motrin- has not needed narcotic medication. Normal voiding and bowel movements. Uses stool softener prn. Taking iron, vitamin c and vitamin b12 \"inconsistently\" but attempting to remember supplementation.      Baby boy is healthy. Breastfeeding well. Most recent weight 8lb 6oz. Denies any breast or nipple concerns.      Reports mood is good. She feels well supported by spouse, family and friends.      Undecided on contraception- interested in information today.     Last pap 10/29/2018 NIL, hpv negative.     O: /69 (BP Location: Right arm, Patient Position: Chair)   Pulse 71   Ht 1.676 m (5' 6\")   Wt 65.6 kg (144 lb 11.2 oz)   LMP 08/15/2018   BMI 23.36 kg/m      Constitutional:    Alert and oriented, well developed, in no acute distress.      Exam:  Neck - supple without lymphadenopathy. Normal thyroid.   Lungs - clear to auscultation bilaterally.  Heart - regular rate and rhythm without murmur.  Breasts:  Lactating, Nipples intact with no lesions, Non-tender and No S/S of yeast or mastitis  Abdomen: Benign, Soft, flat, non-tender, No masses, organomegaly and Diastasis less than 1-2 FB  Incision:  clean, dry, well approximated, no s/s of infection. Steri strips in place.  Uterus: appropriate involution, non-tender      PHQ9= 2, GAD7= 2     A: (Z39.2) Routine postpartum follow-up  (primary encounter diagnosis)        P:  - Reviewed contraception options. Pt undecided. Educational pamphlets given.    Reviewed recommendations " for healthy pregnancy spacing.  Will continue to discuss at 6 week pp visit.  - Continue supplementation with iron, vit c and b12.  - Pap with HPV cotesting due 10/2023.      RTC for 6 week postpartum appointment.     HÉCTOR Chacon CNM

## 2019-06-26 ENCOUNTER — OFFICE VISIT (OUTPATIENT)
Dept: OBGYN | Facility: CLINIC | Age: 29
End: 2019-06-26
Attending: ADVANCED PRACTICE MIDWIFE
Payer: COMMERCIAL

## 2019-06-26 VITALS
WEIGHT: 142 LBS | DIASTOLIC BLOOD PRESSURE: 70 MMHG | BODY MASS INDEX: 22.82 KG/M2 | HEIGHT: 66 IN | HEART RATE: 76 BPM | SYSTOLIC BLOOD PRESSURE: 106 MMHG

## 2019-06-26 LAB
ERYTHROCYTE [DISTWIDTH] IN BLOOD BY AUTOMATED COUNT: 11.3 % (ref 10–15)
HCT VFR BLD AUTO: 39.4 % (ref 35–47)
HGB BLD-MCNC: 13 G/DL (ref 11.7–15.7)
MCH RBC QN AUTO: 31 PG (ref 26.5–33)
MCHC RBC AUTO-ENTMCNC: 33 G/DL (ref 31.5–36.5)
MCV RBC AUTO: 94 FL (ref 78–100)
PLATELET # BLD AUTO: 187 10E9/L (ref 150–450)
RBC # BLD AUTO: 4.19 10E12/L (ref 3.8–5.2)
WBC # BLD AUTO: 9.2 10E9/L (ref 4–11)

## 2019-06-26 PROCEDURE — G0463 HOSPITAL OUTPT CLINIC VISIT: HCPCS

## 2019-06-26 PROCEDURE — 36415 COLL VENOUS BLD VENIPUNCTURE: CPT | Performed by: ADVANCED PRACTICE MIDWIFE

## 2019-06-26 PROCEDURE — 85027 COMPLETE CBC AUTOMATED: CPT | Performed by: ADVANCED PRACTICE MIDWIFE

## 2019-06-26 ASSESSMENT — ANXIETY QUESTIONNAIRES
7. FEELING AFRAID AS IF SOMETHING AWFUL MIGHT HAPPEN: NOT AT ALL
5. BEING SO RESTLESS THAT IT IS HARD TO SIT STILL: NOT AT ALL
3. WORRYING TOO MUCH ABOUT DIFFERENT THINGS: SEVERAL DAYS
1. FEELING NERVOUS, ANXIOUS, OR ON EDGE: NOT AT ALL
6. BECOMING EASILY ANNOYED OR IRRITABLE: SEVERAL DAYS
2. NOT BEING ABLE TO STOP OR CONTROL WORRYING: SEVERAL DAYS
GAD7 TOTAL SCORE: 4

## 2019-06-26 ASSESSMENT — PAIN SCALES - GENERAL: PAINLEVEL: NO PAIN (0)

## 2019-06-26 ASSESSMENT — MIFFLIN-ST. JEOR: SCORE: 1375.86

## 2019-06-26 ASSESSMENT — PATIENT HEALTH QUESTIONNAIRE - PHQ9
SUM OF ALL RESPONSES TO PHQ QUESTIONS 1-9: 3
5. POOR APPETITE OR OVEREATING: SEVERAL DAYS

## 2019-06-26 NOTE — PROGRESS NOTES
"  Nursing Notes:   Angel Torres LPN  2019  9:37 AM  Signed  Chief Complaint   Patient presents with     Postpartum Care     SUBJECTIVE:   Lindsey Giles is here for her 6-week postpartum checkup.     PHQ-9 score:     Hx of Abuse:       Delivery Date: 5/15/19.    Delivering provider:  Liz Hicks MD.    Type of delivery:  .     Delivery complications: Breech position  Infant gender:  boy, weight 7 pounds 11 oz.  Feeding Method:  .  Complications reported with feeding:  none, infant thriving .    Bleeding:  None.  Duration:  4-5 wks.  Menses resumed:  No  Bowel/Urinary problems:  No    Contraception Planned:  IUD Mirena and IUD Paragard  She  has not had intercourse since delivery..       ================================================================  Subjective:   Lindsey is a 30 y/o  s/p  Section in for a 6 wk pp appointment accompanied by her  and  son. She reports no problems with her postpartum course, no abnormal bleeding or pain. Reports incision is healing well with no bleeding, discharge, or signs of infection from the site. Denies problems with bowel or bladder. Denies issues with mood. She is getting appropriate sleep and reports having an excellent support system. Breastfeeding is going well. Patient's other child is a 2 1/2 year old boy who is adjusting well to the . No intercourse since delivery. Interested in a low-dose hormonal IUD. Hgb 9.6 on hospital discharge. Pap up to date 10/29/18 NIL, HPV neg.  ================================================================    ROS: 10 point ROS neg other than the symptoms noted above in the HPI.     EXAM:  /70   Pulse 76   Ht 1.676 m (5' 6\")   Wt 64.4 kg (142 lb)   LMP 08/15/2018   BMI 22.92 kg/m      General: No apparent distress, normal appearing  Psych: No problems with mood or anxiety. Good support system in place  Breasts:  Deferred  Abdomen: Benign, Soft, flat, non-tender, No " masses, organomegaly and Diastasis less than 1-2 FB.   Incision:  Well-healed   Vulva:  Normal genitalia and Bartholin's, Urethra, [place]'s normal  Vagina:  normal with good muscle tone  Cervix:  no lesions.    Uterus:  fully involuted and non-tender    Adnexa:  Within normal limits and No masses, nodularity, tenderness  Recto-vaginal:   anus normal    ASSESSMENT:   Encounter Diagnosis   Name Primary?     Routine postpartum follow-up Yes      Normal postpartum exam after   Pregnancy was complicated by: Breech presentation    PLAN:  Orders Placed This Encounter   Procedures     CBC with Platelets      - Agreeable to CBC today to evaluate anemia and thrombocytopenia.  - Reviewed hormonal IUD options, side effects, benefits and effectiveness. Recommended Kyleena d/t low-dose per patient's desire. Encouraged continued abstinence until IUD insertion.  - Discussed return to physical activity and easing back into activity, listening to body, scale prn.  - RTC in 2 weeks for IUD insertion    I, Param Parkinson, MS3, am serving as a scribe to document services personally performed by CNM based on the provider's statements to me. - Param Parkinson, MS3    The encounter was performed by me and scribed by the student. The scribed note accurately reflects my personal services and decisions made by me. -HÉCTOR Llanos CNM

## 2019-06-26 NOTE — LETTER
2019       RE: Lindsey Giles   [address]  [place] [address]     Dear Colleague,    Thank you for referring your patient, Lindsey Giles, to the [facility] at [facility]. Please see a copy of my visit note below.      Nursing Notes:   Angel Torres LPN  2019  9:37 AM  Signed  Chief Complaint   Patient presents with     Postpartum Care     SUBJECTIVE:   Lindsey Giles is here for her 6-week postpartum checkup.     PHQ-9 score:     Hx of Abuse:       Delivery Date: 5/15/19.    Delivering provider:  Liz Hicks MD.    Type of delivery:  .     Delivery complications: Breech position  Infant gender:  boy, weight 7 pounds 11 oz.  Feeding Method:  .  Complications reported with feeding:  none, infant thriving .    Bleeding:  None.  Duration:  4-5 wks.  Menses resumed:  No  Bowel/Urinary problems:  No    Contraception Planned:  IUD Mirena and IUD Paragard  She  has not had intercourse since delivery..       ================================================================  Subjective:   Lindsey is a 30 y/o  s/p  Section in for a 6 wk pp appointment accompanied by her  and  son. She reports no problems with her postpartum course, no abnormal bleeding or pain. Reports incision is healing well with no bleeding, discharge, or signs of infection from the site. Denies problems with bowel or bladder. Denies issues with mood. She is getting appropriate sleep and reports having an excellent support system. Breastfeeding is going well. Patient's other child is a 2 1/2 year old boy who is adjusting well to the . No intercourse since delivery. Interested in a low-dose hormonal IUD. Hgb 9.6 on hospital discharge. Pap up to date 10/29/18 NIL, HPV neg.  ================================================================    ROS: 10 point ROS neg other than the symptoms noted above in the HPI.     EXAM:  /70  "  Pulse 76   Ht 1.676 m (5' 6\")   Wt 64.4 kg (142 lb)   LMP 08/15/2018   BMI 22.92 kg/m       General: No apparent distress, normal appearing  Psych: No problems with mood or anxiety. Good support system in place  Breasts:  Deferred  Abdomen: Benign, Soft, flat, non-tender, No masses, organomegaly and Diastasis less than 1-2 FB.   Incision:  Well-healed   Vulva:  Normal genitalia and Bartholin's, Urethra, [place]'s normal  Vagina:  normal with good muscle tone  Cervix:  no lesions.    Uterus:  fully involuted and non-tender    Adnexa:  Within normal limits and No masses, nodularity, tenderness  Recto-vaginal:   anus normal    ASSESSMENT:   Encounter Diagnosis   Name Primary?     Routine postpartum follow-up Yes      Normal postpartum exam after   Pregnancy was complicated by: Breech presentation    PLAN:  Orders Placed This Encounter   Procedures     CBC with Platelets      - Agreeable to CBC today to evaluate anemia and thrombocytopenia.  - Reviewed hormonal IUD options, side effects, benefits and effectiveness. Recommended Kyleena d/t low-dose per patient's desire. Encouraged continued abstinence until IUD insertion.  - Discussed return to physical activity and easing back into activity, listening to body, scale prn.  - RTC in 2 weeks for IUD insertion    I, Param Parkinson, MS3, am serving as a scribe to document services personally performed by CNM based on the provider's statements to me. - Param Parkinson, MS3    The encounter was performed by me and scribed by the student. The scribed note accurately reflects my personal services and decisions made by me. -HÉCTOR Llanos CNM    "

## 2019-06-26 NOTE — NURSING NOTE
Chief Complaint   Patient presents with     Postpartum Care     SUBJECTIVE:   Lindsey Giles is here for her 6-week postpartum checkup.     PHQ-9 score:     Hx of Abuse:       Delivery Date: 5/15/19.    Delivering provider:  Liz Hicks MD.    Type of delivery:  .     Delivery complications: Breech position  Infant gender:  boy, weight 7 pounds 11 oz.  Feeding Method:  .  Complications reported with feeding:  none, infant thriving .    Bleeding:  None.  Duration:  4-5 wks.  Menses resumed:  No  Bowel/Urinary problems:  No    Contraception Planned:  IUD Mirena and IUD Paragard  She  has not had intercourse since delivery..

## 2019-06-27 ASSESSMENT — ANXIETY QUESTIONNAIRES: GAD7 TOTAL SCORE: 4

## 2019-07-17 ENCOUNTER — OFFICE VISIT (OUTPATIENT)
Dept: INTERNAL MEDICINE | Facility: CLINIC | Age: 29
End: 2019-07-17
Attending: INTERNAL MEDICINE
Payer: COMMERCIAL

## 2019-07-17 ENCOUNTER — OFFICE VISIT (OUTPATIENT)
Dept: OBGYN | Facility: CLINIC | Age: 29
End: 2019-07-17
Attending: ADVANCED PRACTICE MIDWIFE
Payer: COMMERCIAL

## 2019-07-17 VITALS
HEART RATE: 75 BPM | BODY MASS INDEX: 22.27 KG/M2 | WEIGHT: 138 LBS | SYSTOLIC BLOOD PRESSURE: 108 MMHG | DIASTOLIC BLOOD PRESSURE: 71 MMHG

## 2019-07-17 VITALS
HEIGHT: 66 IN | WEIGHT: 137.9 LBS | DIASTOLIC BLOOD PRESSURE: 76 MMHG | HEART RATE: 65 BPM | SYSTOLIC BLOOD PRESSURE: 126 MMHG | BODY MASS INDEX: 22.16 KG/M2

## 2019-07-17 DIAGNOSIS — Z87.59 HISTORY OF GESTATIONAL HYPERTENSION: Primary | ICD-10-CM

## 2019-07-17 DIAGNOSIS — Z30.430 ENCOUNTER FOR INSERTION OF INTRAUTERINE CONTRACEPTIVE DEVICE: Primary | ICD-10-CM

## 2019-07-17 LAB
ALBUMIN SERPL-MCNC: 4.1 G/DL (ref 3.4–5)
ALBUMIN UR-MCNC: NEGATIVE MG/DL
ANION GAP SERPL CALCULATED.3IONS-SCNC: 7 MMOL/L (ref 3–14)
APPEARANCE UR: CLEAR
BILIRUB UR QL STRIP: NEGATIVE
BUN SERPL-MCNC: 16 MG/DL (ref 7–30)
CALCIUM SERPL-MCNC: 9 MG/DL (ref 8.5–10.1)
CHLORIDE SERPL-SCNC: 107 MMOL/L (ref 94–109)
CO2 SERPL-SCNC: 24 MMOL/L (ref 20–32)
COLOR UR AUTO: ABNORMAL
CREAT SERPL-MCNC: 0.57 MG/DL (ref 0.52–1.04)
CREAT UR-MCNC: 21 MG/DL
ERYTHROCYTE [DISTWIDTH] IN BLOOD BY AUTOMATED COUNT: 11.4 % (ref 10–15)
GFR SERPL CREATININE-BSD FRML MDRD: >90 ML/MIN/{1.73_M2}
GLUCOSE SERPL-MCNC: 84 MG/DL (ref 70–99)
GLUCOSE UR STRIP-MCNC: NEGATIVE MG/DL
HCG UR QL: NEGATIVE
HCT VFR BLD AUTO: 40.3 % (ref 35–47)
HGB BLD-MCNC: 13.4 G/DL (ref 11.7–15.7)
HGB UR QL STRIP: NEGATIVE
INTERNAL QC OK POCT: YES
KETONES UR STRIP-MCNC: NEGATIVE MG/DL
LEUKOCYTE ESTERASE UR QL STRIP: NEGATIVE
MCH RBC QN AUTO: 30.5 PG (ref 26.5–33)
MCHC RBC AUTO-ENTMCNC: 33.3 G/DL (ref 31.5–36.5)
MCV RBC AUTO: 92 FL (ref 78–100)
NITRATE UR QL: NEGATIVE
PH UR STRIP: 6 PH (ref 5–7)
PHOSPHATE SERPL-MCNC: 3.7 MG/DL (ref 2.5–4.5)
PLATELET # BLD AUTO: 165 10E9/L (ref 150–450)
POTASSIUM SERPL-SCNC: 4.5 MMOL/L (ref 3.4–5.3)
PROT UR-MCNC: <0.05 G/L
PROT/CREAT 24H UR: NORMAL G/G CR (ref 0–0.2)
RBC # BLD AUTO: 4.39 10E12/L (ref 3.8–5.2)
RBC #/AREA URNS AUTO: <1 /HPF (ref 0–2)
SODIUM SERPL-SCNC: 138 MMOL/L (ref 133–144)
SOURCE: ABNORMAL
SP GR UR STRIP: 1 (ref 1–1.03)
SQUAMOUS #/AREA URNS AUTO: 2 /HPF (ref 0–1)
UROBILINOGEN UR STRIP-MCNC: NORMAL MG/DL (ref 0–2)
WBC # BLD AUTO: 7.1 10E9/L (ref 4–11)
WBC #/AREA URNS AUTO: 1 /HPF (ref 0–5)

## 2019-07-17 PROCEDURE — 36415 COLL VENOUS BLD VENIPUNCTURE: CPT | Performed by: INTERNAL MEDICINE

## 2019-07-17 PROCEDURE — G0463 HOSPITAL OUTPT CLINIC VISIT: HCPCS | Mod: ZF

## 2019-07-17 PROCEDURE — 81025 URINE PREGNANCY TEST: CPT | Mod: ZF | Performed by: ADVANCED PRACTICE MIDWIFE

## 2019-07-17 PROCEDURE — 58300 INSERT INTRAUTERINE DEVICE: CPT | Mod: ZF | Performed by: ADVANCED PRACTICE MIDWIFE

## 2019-07-17 PROCEDURE — 25000128 H RX IP 250 OP 636: Mod: ZF | Performed by: ADVANCED PRACTICE MIDWIFE

## 2019-07-17 PROCEDURE — 85027 COMPLETE CBC AUTOMATED: CPT | Performed by: ADVANCED PRACTICE MIDWIFE

## 2019-07-17 PROCEDURE — 84156 ASSAY OF PROTEIN URINE: CPT | Performed by: INTERNAL MEDICINE

## 2019-07-17 PROCEDURE — 81001 URINALYSIS AUTO W/SCOPE: CPT | Performed by: INTERNAL MEDICINE

## 2019-07-17 PROCEDURE — 80069 RENAL FUNCTION PANEL: CPT | Performed by: INTERNAL MEDICINE

## 2019-07-17 RX ADMIN — LEVONORGESTREL 19.5 MG: 19.5 INTRAUTERINE DEVICE INTRAUTERINE at 10:17

## 2019-07-17 ASSESSMENT — ANXIETY QUESTIONNAIRES
5. BEING SO RESTLESS THAT IT IS HARD TO SIT STILL: NOT AT ALL
6. BECOMING EASILY ANNOYED OR IRRITABLE: SEVERAL DAYS
7. FEELING AFRAID AS IF SOMETHING AWFUL MIGHT HAPPEN: NOT AT ALL
3. WORRYING TOO MUCH ABOUT DIFFERENT THINGS: SEVERAL DAYS
2. NOT BEING ABLE TO STOP OR CONTROL WORRYING: NOT AT ALL
1. FEELING NERVOUS, ANXIOUS, OR ON EDGE: NOT AT ALL
GAD7 TOTAL SCORE: 3

## 2019-07-17 ASSESSMENT — PATIENT HEALTH QUESTIONNAIRE - PHQ9
SUM OF ALL RESPONSES TO PHQ QUESTIONS 1-9: 5
5. POOR APPETITE OR OVEREATING: SEVERAL DAYS

## 2019-07-17 ASSESSMENT — MIFFLIN-ST. JEOR: SCORE: 1357.26

## 2019-07-17 ASSESSMENT — PAIN SCALES - GENERAL: PAINLEVEL: NO PAIN (0)

## 2019-07-17 NOTE — LETTER
7/17/2019       RE: Lindsey Giles  [address] [place] [facility] [address]     Dear Colleague,    Thank you for referring your patient, Lindsey Giles, to the [facility]  at [facility]. Please see a copy of my visit note below.    HPI  Patient is here for follow up on elevated blood pressure and proteinuria in pregnancy. She reports that she has been feeling well. She denies new medical issues.     Review of Systems     Constitutional:  Negative for fever, chills and fatigue.   Respiratory:   Negative for cough and cough disturbing sleep.    Cardiovascular:  Negative for chest pain and edema.   Gastrointestinal:  Negative for nausea, vomiting, abdominal pain, diarrhea and constipation.   Musculoskeletal:  Negative for back pain and arthralgias.   Skin:  Negative for itching and rash.   Endo/Heme:  Negative for anemia, swollen glands and bruises/bleeds easily.   Psychiatric/Behavioral:  Negative for depression, decreased concentration, mood swings and panic attacks.    Endocrine:  Negative for altered temperature regulation, polyphagia, polydipsia, unwanted hair growth and change in facial hair.    Current Outpatient Medications   Medication     acetaminophen (TYLENOL) 325 MG tablet     cholecalciferol 2000 units CAPS     Ferrous Sulfate (IRON) 325 (65 Fe) MG tablet     Prenatal Multivit-Min-Fe-FA (PRENATAL VITAMINS) 0.8 MG TABS     vitamin B-12 (CYANOCOBALAMIN) 1000 MCG tablet     vitamin C (ASCORBIC ACID) 250 MG tablet     No current facility-administered medications for this visit.      Vitals:    07/17/19 0911 07/17/19 0919 07/17/19 0920 07/17/19 0921   BP: 103/68 106/69 114/75 108/71   Pulse: 75 75 75 75   Weight: 62.6 kg (138 lb)            Physical Exam   Constitutional: She is oriented to person, place, and time. She appears well-developed and well-nourished.   HENT:   Head: Normocephalic and atraumatic.   Eyes: Pupils are equal, round, and  reactive to light. EOM are normal.   Neck: Normal range of motion. Neck supple.   Cardiovascular: Normal rate and regular rhythm.   Pulmonary/Chest: Effort normal.   Musculoskeletal: Normal range of motion. She exhibits no edema.   Neurological: She is alert and oriented to person, place, and time.   Skin: Skin is warm and dry.   Psychiatric: She has a normal mood and affect. Her behavior is normal. Judgment and thought content normal.   Nursing note and vitals reviewed.      Assessment and Plan:  Lindsey was seen today for follow up.    Diagnoses and all orders for this visit:    History of gestational hypertension. Blood pressure is within acceptable range. Will check UA to evaluate for proteinuria, may consider ACE inhibitor if significant proteinuria is detected.   -     Routine UA with micro reflex to culture  -     Protein  random urine with Creat Ratio  -     Renal panel  -     Creatinine urine calculation only      Total time spent 25 minutes.  More than 50% of the time spent with Ms. Giles on counseling / coordinating her care    Cami Cordoba MD

## 2019-07-17 NOTE — PROGRESS NOTES
HPI  Patient is here for follow up on elevated blood pressure and proteinuria in pregnancy. She reports that she has been feeling well. She denies new medical issues.     Review of Systems     Constitutional:  Negative for fever, chills and fatigue.   Respiratory:   Negative for cough and cough disturbing sleep.    Cardiovascular:  Negative for chest pain and edema.   Gastrointestinal:  Negative for nausea, vomiting, abdominal pain, diarrhea and constipation.   Musculoskeletal:  Negative for back pain and arthralgias.   Skin:  Negative for itching and rash.   Endo/Heme:  Negative for anemia, swollen glands and bruises/bleeds easily.   Psychiatric/Behavioral:  Negative for depression, decreased concentration, mood swings and panic attacks.    Endocrine:  Negative for altered temperature regulation, polyphagia, polydipsia, unwanted hair growth and change in facial hair.    Current Outpatient Medications   Medication     acetaminophen (TYLENOL) 325 MG tablet     cholecalciferol 2000 units CAPS     Ferrous Sulfate (IRON) 325 (65 Fe) MG tablet     Prenatal Multivit-Min-Fe-FA (PRENATAL VITAMINS) 0.8 MG TABS     vitamin B-12 (CYANOCOBALAMIN) 1000 MCG tablet     vitamin C (ASCORBIC ACID) 250 MG tablet     No current facility-administered medications for this visit.      Vitals:    07/17/19 0911 07/17/19 0919 07/17/19 0920 07/17/19 0921   BP: 103/68 106/69 114/75 108/71   Pulse: 75 75 75 75   Weight: 62.6 kg (138 lb)            Physical Exam   Constitutional: She is oriented to person, place, and time. She appears well-developed and well-nourished.   HENT:   Head: Normocephalic and atraumatic.   Eyes: Pupils are equal, round, and reactive to light. EOM are normal.   Neck: Normal range of motion. Neck supple.   Cardiovascular: Normal rate and regular rhythm.   Pulmonary/Chest: Effort normal.   Musculoskeletal: Normal range of motion. She exhibits no edema.   Neurological: She is alert and oriented to person, place, and time.    Skin: Skin is warm and dry.   Psychiatric: She has a normal mood and affect. Her behavior is normal. Judgment and thought content normal.   Nursing note and vitals reviewed.      Assessment and Plan:  Lindsey was seen today for follow up.    Diagnoses and all orders for this visit:    History of gestational hypertension. Blood pressure is within acceptable range. Will check UA to evaluate for proteinuria, may consider ACE inhibitor if significant proteinuria is detected.   -     Routine UA with micro reflex to culture  -     Protein  random urine with Creat Ratio  -     Renal panel  -     Creatinine urine calculation only      Total time spent 25 minutes.  More than 50% of the time spent with Ms. Giles on counseling / coordinating her care    Cami Cordoba MD

## 2019-07-17 NOTE — PROGRESS NOTES
"Kyleena IUD Insertion:    CONSULT:    Is a pregnancy test required: Yes.  Was it positive or negative?  Negative  Was a consent obtained?  Yes    Subjective: Lindsey Giles is a 29 year old  presents for Kyleena IUD placement.     Pt is s/p scheduled primary low transverse  section on 5/15/2019 d/t breech presentation/unstable lie. Had her postpartum visit on 19 where she was counseled on contraception options. Desires Kyleena IUD.     Patient has been given the opportunity to ask questions about all forms of birth control, including all options appropriate for Lindsey Giles. Discussed that no method of birth control, except abstinence is 100% effective against pregnancy or sexually transmitted infection.     Lindsey Giles understands she may have the IUD removed at any time. IUD should be removed by a health care provider.    The entire insertion procedure was reviewed with the patient, including care after placement.    No LMP recorded. (Menstrual status: Postpartum). No UPIC in the last 2 weeks. No allergy to betadine or shellfish. Recent STD screening  HCG Qual Urine   Date Value Ref Range Status   2019 Negative neg Final       /76 (BP Location: Left arm, Patient Position: Chair)   Pulse 65   Ht 1.676 m (5' 6\")   Wt 62.6 kg (137 lb 14.4 oz)   BMI 22.26 kg/m      Pelvic Exam:   EG/BUS: normal genital architecture without lesions, erythema or abnormal secretions.   Vagina: moist, pink, rugae with physiologic discharge and secretions  Cervix: multiparous no lesions and pink, moist, closed, without lesion or CMT  Uterus: mid to retroverted position, mobile, no pain  Adnexa: within normal limits and no masses, nodularity, tenderness    PROCEDURE NOTE: -- IUD Insertion    Reason for Insertion: contraception    Under sterile technique, cervix was visualized with speculum and prepped with Betadine solution swab x 3. Tenaculum was placed for stability. The uterus was gently " straightened and sounded to 6.5 cm. IUD prepared for placement, and IUD inserted according to 's instructions without difficulty or significant resitance, and deployed at the fundus. The strings were visualized and trimmed to 2.5 cm from the external os. Tenaculum was removed and hemostasis noted. Speculum removed.  Patient tolerated procedure well.    Lot # [id number]  Exp: 4/2021    EBL: minimal    Complications: none    ASSESSMENT:     ICD-10-CM    1. Encounter for insertion of intrauterine contraceptive device Z30.430 INSERTION INTRAUTERINE DEVICE     hCG qual urine POCT     levonorgestrel (KYLEENA) 19.5 MG IUD 19.5 mg        PLAN:    Given 's handouts, including when to have IUD removed, list of danger s/sx, side effects and follow up recommended.   Encouraged condom use for prevention of STD.   Back up contraception advised for 7 days if progestin method.   Advised to call for any fever, for prolonged or severe pain or bleeding, abnormal vaginal discharge, or unable to palpate strings.   She was advised to use pain medications (ibuprofen) as needed for mild to moderate pain.    Remove or replace Kyleena IUD by 7/17/2024.    RTC in 4-6 weeks for string check.    HÉCTOR Chacon, MYRTLE

## 2019-07-17 NOTE — LETTER
"2019       RE: Lindsey Giles   [address]  [place] [address]     Dear Colleague,    Thank you for referring your patient, Lindsey Giles, to the [facility] at [facility]. Please see a copy of my visit note below.    Kyleena IUD Insertion:    CONSULT:    Is a pregnancy test required: Yes.  Was it positive or negative?  Negative  Was a consent obtained?  Yes    Subjective: Lindsey Giles is a 29 year old  presents for Kyleena IUD placement.     Pt is s/p scheduled primary low transverse  section on 5/15/2019 d/t breech presentation/unstable lie. Had her postpartum visit on 19 where she was counseled on contraception options. Desires Kyleena IUD.     Patient has been given the opportunity to ask questions about all forms of birth control, including all options appropriate for Lindsey Giles. Discussed that no method of birth control, except abstinence is 100% effective against pregnancy or sexually transmitted infection.     Lindsey Giles understands she may have the IUD removed at any time. IUD should be removed by a health care provider.    The entire insertion procedure was reviewed with the patient, including care after placement.    No LMP recorded. (Menstrual status: Postpartum). No UPIC in the last 2 weeks. No allergy to betadine or shellfish. Recent STD screening  HCG Qual Urine   Date Value Ref Range Status   2019 Negative neg Final       /76 (BP Location: Left arm, Patient Position: Chair)   Pulse 65   Ht 1.676 m (5' 6\")   Wt 62.6 kg (137 lb 14.4 oz)   BMI 22.26 kg/m       Pelvic Exam:   EG/BUS: normal genital architecture without lesions, erythema or abnormal secretions.   Vagina: moist, pink, rugae with physiologic discharge and secretions  Cervix: multiparous no lesions and pink, moist, closed, without lesion or CMT  Uterus: mid to retroverted position, mobile, no pain  Adnexa: within normal " limits and no masses, nodularity, tenderness    PROCEDURE NOTE: -- IUD Insertion    Reason for Insertion: contraception    Under sterile technique, cervix was visualized with speculum and prepped with Betadine solution swab x 3. Tenaculum was placed for stability. The uterus was gently straightened and sounded to 6.5 cm. IUD prepared for placement, and IUD inserted according to 's instructions without difficulty or significant resitance, and deployed at the fundus. The strings were visualized and trimmed to 2.5 cm from the external os. Tenaculum was removed and hemostasis noted. Speculum removed.  Patient tolerated procedure well.    Lot # [id number]  Exp: 4/2021    EBL: minimal    Complications: none    ASSESSMENT:     ICD-10-CM    1. Encounter for insertion of intrauterine contraceptive device Z30.430 INSERTION INTRAUTERINE DEVICE     hCG qual urine POCT     levonorgestrel (KYLEENA) 19.5 MG IUD 19.5 mg        PLAN:    Given 's handouts, including when to have IUD removed, list of danger s/sx, side effects and follow up recommended.   Encouraged condom use for prevention of STD.   Back up contraception advised for 7 days if progestin method.   Advised to call for any fever, for prolonged or severe pain or bleeding, abnormal vaginal discharge, or unable to palpate strings.   She was advised to use pain medications (ibuprofen) as needed for mild to moderate pain.    Remove or replace Kyleena IUD by 7/17/2024.    RTC in 4-6 weeks for string check.    HÉCTOR Chacon, MYRTLE

## 2019-07-18 ASSESSMENT — ANXIETY QUESTIONNAIRES: GAD7 TOTAL SCORE: 3

## 2019-07-20 ASSESSMENT — ENCOUNTER SYMPTOMS
INSOMNIA: 0
DECREASED CONCENTRATION: 0
COUGH: 0
DEPRESSION: 0
BACK PAIN: 0
VOMITING: 0
DIARRHEA: 0
ALTERED TEMPERATURE REGULATION: 0
FATIGUE: 0
ARTHRALGIAS: 0
SWOLLEN GLANDS: 0
FEVER: 0
PANIC: 0
NAUSEA: 0
BRUISES/BLEEDS EASILY: 0
POLYPHAGIA: 0
NERVOUS/ANXIOUS: 0
POLYDIPSIA: 0
ABDOMINAL PAIN: 0
CHILLS: 0
COUGH DISTURBING SLEEP: 0
CONSTIPATION: 0

## 2019-08-18 NOTE — PROGRESS NOTES
"Lindsey Giles presents for IUD string check.  Pt had Kyleena inserted on 7/17/19 without complication.  Pt reports she has not checked the string herself. Pt has the following concerns: Has continuous spotting since insertion. Has been improving.  Was more like period at first, not lighter. Pt has no other complaints with the Kyleena.     O:  /73   Pulse 69   Ht 1.676 m (5' 6\")   Wt 61.6 kg (135 lb 12.8 oz)   BMI 21.92 kg/m       Pelvic Exam:  Exam:  Constitutional: healthy, alert and no distress  Head: Normocephalic. No masses, lesions, tenderness or abnormalities  Musculoskeletal: extremities normal- no gross deformities noted, gait normal and normal muscle tone  Skin: no suspicious lesions or rashes    Psychiatric: mentation appears normal and affect normal/bright  Vulva: No external lesions, normal hair distribution, no adenopathy  Vagina: Moist, pink, small amount of menstrual blood seen in vault. , well rugated, no lesions  Cervix: IUD strings visualized 3cm from os, no visible lesions  Uterus: Normal size, anteverted, non-tender, mobile  Ovaries: No mass, non-tender, mobile    P:   Kyleena due for removal  by 7/17/24  Discussed spotting is normal and expected at this time. Pt agreeable to wait a few more months to see if spotting resolves.     HÉCTOR Martin CNM on 8/19/2019 at 10:27 AM        "

## 2019-08-19 ENCOUNTER — OFFICE VISIT (OUTPATIENT)
Dept: OBGYN | Facility: CLINIC | Age: 29
End: 2019-08-19
Attending: ADVANCED PRACTICE MIDWIFE
Payer: COMMERCIAL

## 2019-08-19 VITALS
HEIGHT: 66 IN | BODY MASS INDEX: 21.83 KG/M2 | SYSTOLIC BLOOD PRESSURE: 107 MMHG | WEIGHT: 135.8 LBS | DIASTOLIC BLOOD PRESSURE: 73 MMHG | HEART RATE: 69 BPM

## 2019-08-19 DIAGNOSIS — Z30.431 INTRAUTERINE DEVICE SURVEILLANCE: Primary | ICD-10-CM

## 2019-08-19 PROBLEM — Z34.80 PRENATAL CARE, SUBSEQUENT PREGNANCY, UNSPECIFIED TRIMESTER: Status: RESOLVED | Noted: 2018-10-15 | Resolved: 2019-08-19

## 2019-08-19 PROCEDURE — G0463 HOSPITAL OUTPT CLINIC VISIT: HCPCS | Mod: ZF

## 2019-08-19 ASSESSMENT — ANXIETY QUESTIONNAIRES
2. NOT BEING ABLE TO STOP OR CONTROL WORRYING: NOT AT ALL
5. BEING SO RESTLESS THAT IT IS HARD TO SIT STILL: NOT AT ALL
1. FEELING NERVOUS, ANXIOUS, OR ON EDGE: NOT AT ALL
3. WORRYING TOO MUCH ABOUT DIFFERENT THINGS: NOT AT ALL
7. FEELING AFRAID AS IF SOMETHING AWFUL MIGHT HAPPEN: NOT AT ALL
6. BECOMING EASILY ANNOYED OR IRRITABLE: NOT AT ALL
GAD7 TOTAL SCORE: 0

## 2019-08-19 ASSESSMENT — MIFFLIN-ST. JEOR: SCORE: 1347.73

## 2019-08-19 ASSESSMENT — PAIN SCALES - GENERAL: PAINLEVEL: NO PAIN (0)

## 2019-08-19 ASSESSMENT — PATIENT HEALTH QUESTIONNAIRE - PHQ9
5. POOR APPETITE OR OVEREATING: NOT AT ALL
SUM OF ALL RESPONSES TO PHQ QUESTIONS 1-9: 0

## 2019-08-19 NOTE — LETTER
"8/19/2019       RE: Lindsey Giles  [address]  [place] [address]     Dear Colleague,    Thank you for referring your patient, Lindsey Giles, to [facility] Please see a copy of my visit note below.    Lindsey Giles presents for IUD string check.  Pt had Kyleena inserted on 7/17/19 without complication.  Pt reports she has not checked the string herself. Pt has the following concerns: Has continuous spotting since insertion. Has been improving.  Was more like period at first, not lighter. Pt has no other complaints with the Kyleena.     O:/73   Pulse 69   Ht 1.676 m (5' 6\")   Wt 61.6 kg (135 lb 12.8 oz)   BMI 21.92 kg/m        Pelvic Exam:  Exam:  Constitutional: healthy, alert and no distress  Head: Normocephalic. No masses, lesions, tenderness or abnormalities  Musculoskeletal: extremities normal- no gross deformities noted, gait normal and normal muscle tone  Skin: no suspicious lesions or rashes    Psychiatric: mentation appears normal and affect normal/bright  Vulva: No external lesions, normal hair distribution, no adenopathy  Vagina: Moist, pink, small amount of menstrual blood seen in vault. , well rugated, no lesions  Cervix: IUD strings visualized 3cm from os, no visible lesions  Uterus: Normal size, anteverted, non-tender, mobile  Ovaries: No mass, non-tender, mobile    P: Kyleena due for removal  by 7/17/24  Discussed spotting is normal and expected at this time. Pt agreeable to wait a few more months to see if spotting resolves.     HÉCTOR Martin CNM on 8/19/2019 at 10:27 AM  "

## 2019-08-20 ASSESSMENT — ANXIETY QUESTIONNAIRES: GAD7 TOTAL SCORE: 0

## 2019-09-25 ENCOUNTER — OFFICE VISIT (OUTPATIENT)
Dept: URGENT CARE | Facility: URGENT CARE | Age: 29
End: 2019-09-25
Payer: COMMERCIAL

## 2019-09-25 VITALS
TEMPERATURE: 98.8 F | HEIGHT: 66 IN | SYSTOLIC BLOOD PRESSURE: 102 MMHG | DIASTOLIC BLOOD PRESSURE: 64 MMHG | WEIGHT: 133 LBS | BODY MASS INDEX: 21.38 KG/M2 | OXYGEN SATURATION: 97 % | HEART RATE: 75 BPM

## 2019-09-25 DIAGNOSIS — J02.9 VIRAL PHARYNGITIS: Primary | ICD-10-CM

## 2019-09-25 LAB
DEPRECATED S PYO AG THROAT QL EIA: NORMAL
SPECIMEN SOURCE: NORMAL

## 2019-09-25 PROCEDURE — 99213 OFFICE O/P EST LOW 20 MIN: CPT | Performed by: FAMILY MEDICINE

## 2019-09-25 PROCEDURE — 87880 STREP A ASSAY W/OPTIC: CPT | Performed by: INTERNAL MEDICINE

## 2019-09-25 PROCEDURE — 87081 CULTURE SCREEN ONLY: CPT | Performed by: FAMILY MEDICINE

## 2019-09-25 ASSESSMENT — MIFFLIN-ST. JEOR: SCORE: 1335.03

## 2019-09-25 NOTE — PROGRESS NOTES
SUBJECTIVE:   Lindsey Giles is a 29 year old female presenting with a chief complaint of   Chief Complaint   Patient presents with     Urgent Care     Pharyngitis     Sore throat started yesterday.  Her infant just recovering from hand, foot and mouth.       She is an established patient of [facility].    HPI:    Sore throat started 1 day(s) ago.    Current throat pain is 4/10, noted to be mildly worse today.  Current and Associated symptoms: subjective fever/chills last night (resolved) and mild headache  Patient DENIES the following symptoms: nasal congestion, facial pressure, cough, chest pain, shortness of breath, nausea, vomiting, or abdominal pain   Treatment measures tried include: Tylenol/Ibuprofen  Palliative factors include:  Tylenol/Ibuprofen  Predisposing factors/exposures include: Patient is breast-feeding her 4-month-old son, who recently had hand-foot-and-mouth disease.  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  No Strep or other exposures reported, but patient would like to rule out Strep.      Patient is eating, drinking, and voiding normally.     Review of Systems  Patient denies risk for pregnancy.    Patient Active Problem List   Diagnosis     Seasonal allergies     History of gestational hypertension     History of depression     Antepartum anemia - PO iron/B12/C then rehceck ~4 weeks (~31 weeks)     Gestational thrombocytopenia (H)     Transverse presentation, on 19     S/P  section       Past Medical History:   Diagnosis Date     History of depression     Situational in high school - Lexapro. No SI or hospitalizations     History of gestational hypertension        No Known Allergies    Family History   Problem Relation Age of Onset     Musculoskeletal Disorder Mother      Multiple Sclerosis Mother 42        doing well,     Hypertension Mother      Diabetes Maternal Grandmother      Autism Spectrum Disorder Other      Autism Spectrum  "Disorder Cousin        Current Outpatient Medications   Medication Sig Dispense Refill     cholecalciferol 2000 units CAPS Take 2,000 Units by mouth daily Take one capsule daily.       levonorgestrel (KYLEENA) 19.5 MG IUD 1 each by Intrauterine route once       Prenatal Multivit-Min-Fe-FA (PRENATAL VITAMINS) 0.8 MG TABS  30 tablet        Social History     Tobacco Use     Smoking status: Never Smoker     Smokeless tobacco: Never Used   Substance Use Topics     Alcohol use: No       OBJECTIVE  /64 (BP Location: Right arm, Patient Position: Sitting, Cuff Size: Adult Small)   Pulse 75   Temp 98.8  F (37.1  C) (Oral)   Ht 1.676 m (5' 6\")   Wt 60.3 kg (133 lb)   SpO2 97%   BMI 21.47 kg/m      Physical Exam    GENERAL APPEARANCE:  Awake, alert, and in no acute distress.  Normal phonation.  PSYCHIATRIC:  Pleasant affect.  HEENT:  Sclera anicteric.  No conjunctivitis.  PERRLA.  Extraocular movements are intact.  Bilateral TM's and canals are within normal limits.  No nasal congestion.  Sinuses are not tender to palpation.  Mild erythema in the posterior pharynx, with exudates noted involving the left > right tonsil.  No edema or  trismus.  No lesions of the oral mucosa or posterior pharynx.  Mucous membranes moist.  NECK:  Spontaneous full range of motion.  No thyromegaly or mass.  No lymphadenopathy.  No nuchal rigidity.  HEART:  Normal S1, S2.  Regular rate and rhythm.  No murmurs, rubs, or gallops.  LUNGS:  No respiratory distress.  No wheezes, rales, or rhonchi.  ABDOMEN:  Not distended.  Soft.  Not tender.  No mass or organomegaly.  EXTREMITIES:  Moves 4 extremities.     SKIN:  No rash.    Labs:  Results for orders placed or performed in visit on 09/25/19 (from the past 24 hour(s))   Strep, Rapid Screen   Result Value Ref Range    Specimen Description Throat     Rapid Strep A Screen       NEGATIVE: No Group A streptococcal antigen detected by immunoassay, await culture report.       ASSESSMENT:      " ICD-10-CM    1. Viral pharyngitis J02.9 Strep, Rapid Screen     Beta strep group A culture      Rapid Strep is negative today.    PLAN:    Letter was given for work, as noted in Epic.    Patient agrees with holding off on antibiotics (risk/benefits discussed), pending the results of the Strep Culture.      Note: Patient is breast-feeding her 4-month-old son.    Patient Instructions     Symptomatic cares, gargles, and lozenges, only as discussed.    Over-the-counter medications (e.g. Ibuprofen), only as discussed and safe to use in breastfeeding.    We will notify and treat you if your Strep Culture is positive.    Follow-up if worsening symptoms or not gradually improving over the next week.    Follow up in the ER immediately if:  breathing difficulties, signs/symptoms of dehydration (e.g. no urine output in 8 hours), or other severe/emergent symptoms.    Discussed risks and benefits of treatment strategies, as noted in the Assessment and Plan sections.    The patient was discharged ambulatory and in stable condition post discussion of follow up.     The above dictation was composed using Dragon software.    Radha Alarcon MD

## 2019-09-25 NOTE — PATIENT INSTRUCTIONS
Symptomatic cares, gargles, and lozenges, only as discussed.    Over-the-counter medications (e.g. Ibuprofen), only as discussed and safe to use in breastfeeding.    We will notify and treat you if your Strep Culture is positive.    Follow-up if worsening symptoms or not gradually improving over the next week.    Follow up in the ER immediately if:  breathing difficulties, signs/symptoms of dehydration (e.g. no urine output in 8 hours), or other severe/emergent symptoms.

## 2019-09-25 NOTE — LETTER
September 25, 2019      Lindsey Giles  [address] [place] [address]      To Whom It May Concern:      Lindsey Giles was seen in our Urgent Care on 9/25/2019. She may return to work on 9/26/2019, assuming that she does not have a fever.      Sincerely,      Radha Alarcon MD

## 2019-09-26 LAB
BACTERIA SPEC CULT: NORMAL
SPECIMEN SOURCE: NORMAL

## 2019-11-02 ENCOUNTER — IMMUNIZATION (OUTPATIENT)
Dept: NURSING | Facility: CLINIC | Age: 29
End: 2019-11-02
Payer: COMMERCIAL

## 2019-11-02 DIAGNOSIS — Z23 NEED FOR PROPHYLACTIC VACCINATION AND INOCULATION AGAINST INFLUENZA: Primary | ICD-10-CM

## 2019-11-02 PROCEDURE — 99207 ZZC NO CHARGE NURSE ONLY: CPT

## 2019-11-02 PROCEDURE — 90686 IIV4 VACC NO PRSV 0.5 ML IM: CPT

## 2019-11-02 PROCEDURE — 90471 IMMUNIZATION ADMIN: CPT

## 2020-03-10 ENCOUNTER — HEALTH MAINTENANCE LETTER (OUTPATIENT)
Age: 30
End: 2020-03-10

## 2020-09-28 ENCOUNTER — ALLIED HEALTH/NURSE VISIT (OUTPATIENT)
Dept: NURSING | Facility: CLINIC | Age: 30
End: 2020-09-28
Payer: COMMERCIAL

## 2020-09-28 DIAGNOSIS — Z23 NEED FOR PROPHYLACTIC VACCINATION AND INOCULATION AGAINST INFLUENZA: Primary | ICD-10-CM

## 2020-09-28 PROCEDURE — 90471 IMMUNIZATION ADMIN: CPT

## 2020-09-28 PROCEDURE — 99207 ZZC NO CHARGE NURSE ONLY: CPT

## 2020-09-28 PROCEDURE — 90686 IIV4 VACC NO PRSV 0.5 ML IM: CPT

## 2021-01-27 ENCOUNTER — OFFICE VISIT (OUTPATIENT)
Dept: OBGYN | Facility: CLINIC | Age: 31
End: 2021-01-27
Attending: MIDWIFE
Payer: COMMERCIAL

## 2021-01-27 VITALS
SYSTOLIC BLOOD PRESSURE: 106 MMHG | WEIGHT: 129.4 LBS | HEART RATE: 74 BPM | DIASTOLIC BLOOD PRESSURE: 71 MMHG | HEIGHT: 66 IN | BODY MASS INDEX: 20.79 KG/M2

## 2021-01-27 DIAGNOSIS — Z13.29 SCREENING FOR THYROID DISORDER: ICD-10-CM

## 2021-01-27 DIAGNOSIS — Z00.00 VISIT FOR PREVENTIVE HEALTH EXAMINATION: Primary | ICD-10-CM

## 2021-01-27 DIAGNOSIS — Z13.220 SCREENING FOR LIPOID DISORDERS: ICD-10-CM

## 2021-01-27 PROCEDURE — 99395 PREV VISIT EST AGE 18-39: CPT | Performed by: MIDWIFE

## 2021-01-27 RX ORDER — MULTIPLE VITAMINS W/ MINERALS TAB 9MG-400MCG
1 TAB ORAL DAILY
COMMUNITY
End: 2022-09-29

## 2021-01-27 SDOH — HEALTH STABILITY: MENTAL HEALTH: HOW MANY STANDARD DRINKS CONTAINING ALCOHOL DO YOU HAVE ON A TYPICAL DAY?: NOT ASKED

## 2021-01-27 SDOH — HEALTH STABILITY: MENTAL HEALTH: HOW OFTEN DO YOU HAVE 6 OR MORE DRINKS ON ONE OCCASION?: NEVER

## 2021-01-27 SDOH — HEALTH STABILITY: MENTAL HEALTH: HOW OFTEN DO YOU HAVE A DRINK CONTAINING ALCOHOL?: MONTHLY OR LESS

## 2021-01-27 ASSESSMENT — ENCOUNTER SYMPTOMS
BREAST PAIN: 0
NAIL CHANGES: 0
POSTURAL DYSPNEA: 0
DIFFICULTY URINATING: 0
ORTHOPNEA: 0
COUGH DISTURBING SLEEP: 0
LIGHT-HEADEDNESS: 0
POLYPHAGIA: 0
RESPIRATORY PAIN: 0
CHILLS: 0
POOR WOUND HEALING: 0
FATIGUE: 0
INCREASED ENERGY: 0
HEMOPTYSIS: 0
SKIN CHANGES: 0
BLOATING: 0
LEG PAIN: 0
HEMATURIA: 0
WHEEZING: 0
FLANK PAIN: 0
WEIGHT GAIN: 0
BOWEL INCONTINENCE: 0
CLAUDICATION: 0
RECTAL BLEEDING: 0
SHORTNESS OF BREATH: 0
SPUTUM PRODUCTION: 0
DYSURIA: 0
HALLUCINATIONS: 0
TACHYCARDIA: 0
CONSTIPATION: 0
VOMITING: 0
HEARTBURN: 0
BREAST MASS: 0
SNORES LOUDLY: 0
DYSPNEA ON EXERTION: 0
COUGH: 0
HYPERTENSION: 0
POLYDIPSIA: 0
ABDOMINAL PAIN: 0
NIGHT SWEATS: 0
ALTERED TEMPERATURE REGULATION: 0
FEVER: 0
LEG SWELLING: 0
JAUNDICE: 0
BLOOD IN STOOL: 0
DIARRHEA: 0
PALPITATIONS: 0
DECREASED APPETITE: 0
SLEEP DISTURBANCES DUE TO BREATHING: 0
SYNCOPE: 0
HYPOTENSION: 0
WEIGHT LOSS: 0
EXERCISE INTOLERANCE: 0
RECTAL PAIN: 0
NAUSEA: 0

## 2021-01-27 ASSESSMENT — ANXIETY QUESTIONNAIRES
3. WORRYING TOO MUCH ABOUT DIFFERENT THINGS: SEVERAL DAYS
5. BEING SO RESTLESS THAT IT IS HARD TO SIT STILL: NOT AT ALL
GAD7 TOTAL SCORE: 3
7. FEELING AFRAID AS IF SOMETHING AWFUL MIGHT HAPPEN: NOT AT ALL
6. BECOMING EASILY ANNOYED OR IRRITABLE: SEVERAL DAYS
1. FEELING NERVOUS, ANXIOUS, OR ON EDGE: NOT AT ALL
2. NOT BEING ABLE TO STOP OR CONTROL WORRYING: NOT AT ALL

## 2021-01-27 ASSESSMENT — PATIENT HEALTH QUESTIONNAIRE - PHQ9
5. POOR APPETITE OR OVEREATING: SEVERAL DAYS
SUM OF ALL RESPONSES TO PHQ QUESTIONS 1-9: 4

## 2021-01-27 ASSESSMENT — MIFFLIN-ST. JEOR: SCORE: 1313.45

## 2021-01-27 ASSESSMENT — PAIN SCALES - GENERAL: PAINLEVEL: NO PAIN (0)

## 2021-01-27 NOTE — PROGRESS NOTES
Progress Note    SUBJECTIVE:  Lindsey Giles is an 30 year old, , who requests an Annual Preventive Exam.     Concerns today include:   - Has been planning to seek out counseling. Became teary saying this was a goal for herself last year. Then it was an extra stressful year, rasheed with pt and  working from home and no  during due to COVID.     2019 Kyleena insertion- happy with method. Had no menses for >1 year, the returned to light menses monthly in 10/2020. Notices minor right side discomfort sometimes after sexual intercourse. Denies dyspareunia.      Screenings:   Pap: NILM/HPV negative 10/2018, due 10/2023  Lipids: due. Will return while fasting.   TSH: due today. Mother with thyroid disease on medication.      Menstrual History:  Menstrual History 10/15/2018 2021   LAST MENSTRUAL PERIOD 8/15/2018 2020     Last    Lab Results   Component Value Date    PAP NIL 10/29/2018     History of abnormal Pap smear: NO - age 30-65 PAP every 5 years with negative HPV co-testing recommended    Last   Lab Results   Component Value Date    HPV16 Negative 10/29/2018     Last   Lab Results   Component Value Date    HPV18 Negative 10/29/2018     Last   Lab Results   Component Value Date    HRHPV Negative 10/29/2018       Mammogram current: not applicable  Last Mammogram:   No results found.     Last Colonoscopy:  No results found for this or any previous visit.      HISTORY:       multivitamin w/minerals (THERA-VIT-M) tablet, Take 1 tablet by mouth daily       levonorgestrel (KYLEENA) 19.5 MG IUD, 1 each by Intrauterine route once    No current facility-administered medications on file prior to visit.     No Known Allergies  Immunization History   Administered Date(s) Administered     Flu, Unspecified 2018     Hep B, Peds or Adolescent 1997, 10/10/1997, 1998     Influenza Vaccine IM > 6 months Valent IIV4 10/26/2015, 2016, 2018, 2018, 2019, 2020      Influenza,INJ,MDCK,PF,Quad >4yrs 2018     MMR 1989, 1994     TDAP Vaccine (Boostrix) 2016, 2019       OB History    Para Term  AB Living   2 2 2 0 0 2   SAB TAB Ectopic Multiple Live Births   0 0 0 0 2   Obstetric Comments   No PPH, no GDM. +Gestational Hypertension with IOL - no Magnesium Sulfate     Past Medical History:   Diagnosis Date     History of depression     Situational in high school - Lexapro. No SI or hospitalizations     History of gestational hypertension      Past Surgical History:   Procedure Laterality Date      SECTION N/A 5/15/2019    Procedure: Primary  Section;  Surgeon: Liz Hicks MD;  Location: [facility]     NO HISTORY OF SURGERY       Family History   Problem Relation Age of Onset     Musculoskeletal Disorder Mother      Multiple Sclerosis Mother 42        doing well,     Hypertension Mother      Fibroids Mother         hysterectomy     Thyroid Disease Mother         unsure of what- takes medication     Diabetes Maternal Grandmother      Autism Spectrum Disorder Other      Autism Spectrum Disorder Cousin      Social History     Socioeconomic History     Marital status:      Spouse name: Joel     Number of children: 1     Years of education: 16     Highest education level: Not on file   Occupational History     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   Social Needs     Financial resource strain: Not on file     Food insecurity     Worry: Not on file     Inability: Not on file     Transportation needs     Medical: Not on file     Non-medical: Not on file   Tobacco Use     Smoking status: Never Smoker     Smokeless tobacco: Never Used   Substance and Sexual Activity     Alcohol use: No     Drug use: No     Sexual activity: Yes     Partners: Male   Lifestyle     Physical activity     Days per week: Not on file     Minutes per session: Not on file     Stress: Not on file   Relationships      Social connections     Talks on phone: Not on file     Gets together: Not on file     Attends Yazidi service: Not on file     Active member of club or organization: Not on file     Attends meetings of clubs or organizations: Not on file     Relationship status: Not on file     Intimate partner violence     Fear of current or ex partner: Not on file     Emotionally abused: Not on file     Physically abused: Not on file     Forced sexual activity: Not on file   Other Topics Concern     Not on file   Social History Narrative    How much exercise per week? 3-4xweek    How much calcium per day? Dietary and PNV       How much caffeine per day? None    How much vitamin D per day? Dietary and supplement    Do you/your family wear seatbelts?  Yes    Do you/your family use safety helmets? Yes    Do you/your family use sunscreen? Yes    Do you/your family keep firearms in the home? No    Do you/your family have a smoke detector(s)? Yes        Do you feel safe in your home? Yes    Has anyone ever touched you in an unwanted manner? No             Maura Gates APRN, CNM       Review of Systems     Constitutional:  Negative for fever, chills, weight loss, weight gain, fatigue, decreased appetite, night sweats, recent stressors, height gain, height loss, post-operative complications, incisional pain, hallucinations, increased energy, hyperactivity and confused.   Respiratory:   Negative for cough, hemoptysis, sputum production, shortness of breath, wheezing, sleep disturbances due to breathing, snores loudly, respiratory pain, dyspnea on exertion, cough disturbing sleep and postural dyspnea.    Cardiovascular:  Negative for chest pain, dyspnea on exertion, palpitations, orthopnea, claudication, leg swelling, fingers/toes turn blue, hypertension, hypotension, syncope, history of heart murmur, chest pain on exertion, chest pain at rest, pacemaker, few scattered varicosities, leg pain, sleep disturbances due to breathing,  "tachycardia, light-headedness, exercise intolerance and edema.   Gastrointestinal:  Negative for heartburn, nausea, vomiting, abdominal pain, diarrhea, constipation, blood in stool, melena, rectal pain, bloating, hemorrhoids, bowel incontinence, jaundice, rectal bleeding, coffee ground emesis and change in stool.   Genitourinary:  Negative for bladder incontinence, dysuria, urgency, hematuria, flank pain, difficulty urinating, nocturia and voiding less frequently.   Skin:  Negative for nail changes, itching, poor wound healing, rash, hair changes, skin changes, acne, warts, poor wound healing, scarring, flaky skin, Raynaud's phenomenon, sensitivity to sunlight and skin thickening.   Neurological:  Negative for light-headedness.   Psychiatric/Behavioral:  Negative for hallucinations.    Breast:  Negative for breast discharge, breast mass, breast pain and nipple retraction.   Endocrine:  Negative for altered temperature regulation, polyphagia, polydipsia, unwanted hair growth and change in facial hair.    [unfilled]  PHQ-9 SCORE 7/17/2019 8/19/2019 1/27/2021   PHQ-9 Total Score 5 0 4     ARMEN-7 SCORE 7/17/2019 8/19/2019 1/27/2021   Total Score 3 0 3       EXAM:  Blood pressure 106/71, pulse 74, height 1.676 m (5' 5.98\"), weight 58.7 kg (129 lb 6.4 oz), last menstrual period 12/27/2020, not currently breastfeeding. Body mass index is 20.9 kg/m .  General - pleasant female in no acute distress.  Skin - no suspicious lesions or rashes  EENT-  PERRLA, euthyroid with out palpable nodules  Neck - supple without lymphadenopathy.  Lungs - clear to auscultation bilaterally.  Heart - regular rate and rhythm without murmur.  Abdomen - soft, nontender, nondistended, no masses or organomegaly noted.  Musculoskeletal - no gross deformities.  Neurological - normal strength, sensation, and mental status.    Breast Exam:  Breast: Without visible skin changes. No dimpling or lesions seen.   Breasts supple, non-tender with palpation, no " dominant mass, nodularity, or nipple discharge noted bilaterally. Axillary nodes negative.      Pelvic Exam:  EG/BUS: Normal genital architecture without lesions, erythema or abnormal secretions Bartholin's, Urethra, [place]'s normal  Urethral meatus: normal   Urethra: no masses, tenderness, or scarring   Bladder: no masses or tenderness   Vagina: moist, pink, rugae with creamy, white and odorless  secretions  Cervix: pink, moist, closed, without lesion or CMT and one IUD string extend 2-3 cm from external os.  Uterus: small, smooth, firm, mobile w/o pain  Adnexa: Within normal limits and No masses, nodularity, tenderness  Rectum:anus normal       ASSESSMENT:  Encounter Diagnoses   Name Primary?     Visit for preventive health examination Yes     Screening for lipoid disorders      Screening for thyroid disorder         PLAN:   Orders Placed This Encounter   Procedures     Lipid panel reflex to direct LDL Fasting     TSH with free T4 reflex     Orders Placed This Encounter   Medications     multivitamin w/minerals (THERA-VIT-M) tablet     Sig: Take 1 tablet by mouth daily     - Accepting of referral to [facility] mental health providers. Message sent to Charline Morales and Shayla Luu to help patient establish care.   - Encouraged too track right side discomfort. Reviewed if midcycle its likely mittleschmerz. Call if worsening or bothersome.    - Return to lab for TSH and lipids when fasting.     Additional teaching done at this visit regarding calcium (1200 mg per day), self breast awareness, birth control and mental health.    Return to clinic in one year.  Follow-up as needed.    HÉCTOR Khan, MYRTLE

## 2021-01-27 NOTE — LETTER
2021       RE: Lindsey Giles   [address]  [place] [address]     Dear Colleague,    Thank you for referring your patient, Lindsey Giles, to the [facility] [place] [facility]. Please see a copy of my visit note below.    Progress Note    SUBJECTIVE:  Lindsey Giles is an 30 year old, , who requests an Annual Preventive Exam.     Concerns today include:   - Has been planning to seek out counseling. Became teary saying this was a goal for herself last year. Then it was an extra stressful year, rasheed with pt and  working from home and no  during due to COVID.     2019 Kyleena insertion- happy with method. Had no menses for >1 year, the returned to light menses monthly in 10/2020. Notices minor right side discomfort sometimes after sexual intercourse. Denies dyspareunia.      Screenings:   Pap: NILM/HPV negative 10/2018, due 10/2023  Lipids: due. Will return while fasting.   TSH: due today. Mother with thyroid disease on medication.      Menstrual History:  Menstrual History 10/15/2018 2021   LAST MENSTRUAL PERIOD 8/15/2018 2020     Last    Lab Results   Component Value Date    PAP NIL 10/29/2018     History of abnormal Pap smear: NO - age 30-65 PAP every 5 years with negative HPV co-testing recommended    Last   Lab Results   Component Value Date    HPV16 Negative 10/29/2018     Last   Lab Results   Component Value Date    HPV18 Negative 10/29/2018     Last   Lab Results   Component Value Date    HRHPV Negative 10/29/2018       Mammogram current: not applicable  Last Mammogram:   No results found.     Last Colonoscopy:  No results found for this or any previous visit.      HISTORY:       multivitamin w/minerals (THERA-VIT-M) tablet, Take 1 tablet by mouth daily       levonorgestrel (KYLEENA) 19.5 MG IUD, 1 each by Intrauterine route once    No current facility-administered medications on file prior to  visit.     No Known Allergies  Immunization History   Administered Date(s) Administered     Flu, Unspecified 2018     Hep B, Peds or Adolescent 1997, 10/10/1997, 1998     Influenza Vaccine IM > 6 months Valent IIV4 10/26/2015, 2016, 2018, 2018, 2019, 2020     Influenza,INJ,MDCK,PF,Quad >4yrs 2018     MMR 1989, 1994     TDAP Vaccine (Boostrix) 2016, 2019       OB History    Para Term  AB Living   2 2 2 0 0 2   SAB TAB Ectopic Multiple Live Births   0 0 0 0 2   Obstetric Comments   No PPH, no GDM. +Gestational Hypertension with IOL - no Magnesium Sulfate     Past Medical History:   Diagnosis Date     History of depression     Situational in high school - Lexapro. No SI or hospitalizations     History of gestational hypertension      Past Surgical History:   Procedure Laterality Date      SECTION N/A 5/15/2019    Procedure: Primary  Section;  Surgeon: Liz Hicks MD;  Location: [facility]     NO HISTORY OF SURGERY       Family History   Problem Relation Age of Onset     Musculoskeletal Disorder Mother      Multiple Sclerosis Mother 42        doing well,     Hypertension Mother      Fibroids Mother         hysterectomy     Thyroid Disease Mother         unsure of what- takes medication     Diabetes Maternal Grandmother      Autism Spectrum Disorder Other      Autism Spectrum Disorder Cousin      Social History     Socioeconomic History     Marital status:      Spouse name: Joel     Number of children: 1     Years of education: 16     Highest education level: Not on file   Occupational History     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   Social Needs     Financial resource strain: Not on file     Food insecurity     Worry: Not on file     Inability: Not on file     Transportation needs     Medical: Not on file     Non-medical: Not on file   Tobacco Use     Smoking  status: Never Smoker     Smokeless tobacco: Never Used   Substance and Sexual Activity     Alcohol use: No     Drug use: No     Sexual activity: Yes     Partners: Male   Lifestyle     Physical activity     Days per week: Not on file     Minutes per session: Not on file     Stress: Not on file   Relationships     Social connections     Talks on phone: Not on file     Gets together: Not on file     Attends Mormon service: Not on file     Active member of club or organization: Not on file     Attends meetings of clubs or organizations: Not on file     Relationship status: Not on file     Intimate partner violence     Fear of current or ex partner: Not on file     Emotionally abused: Not on file     Physically abused: Not on file     Forced sexual activity: Not on file   Other Topics Concern     Not on file   Social History Narrative    How much exercise per week? 3-4xweek    How much calcium per day? Dietary and PNV       How much caffeine per day? None    How much vitamin D per day? Dietary and supplement    Do you/your family wear seatbelts?  Yes    Do you/your family use safety helmets? Yes    Do you/your family use sunscreen? Yes    Do you/your family keep firearms in the home? No    Do you/your family have a smoke detector(s)? Yes        Do you feel safe in your home? Yes    Has anyone ever touched you in an unwanted manner? No             Maura Gates APRN, CNM       Review of Systems     Constitutional:  Negative for fever, chills, weight loss, weight gain, fatigue, decreased appetite, night sweats, recent stressors, height gain, height loss, post-operative complications, incisional pain, hallucinations, increased energy, hyperactivity and confused.   Respiratory:   Negative for cough, hemoptysis, sputum production, shortness of breath, wheezing, sleep disturbances due to breathing, snores loudly, respiratory pain, dyspnea on exertion, cough disturbing sleep and postural dyspnea.    Cardiovascular:   "Negative for chest pain, dyspnea on exertion, palpitations, orthopnea, claudication, leg swelling, fingers/toes turn blue, hypertension, hypotension, syncope, history of heart murmur, chest pain on exertion, chest pain at rest, pacemaker, few scattered varicosities, leg pain, sleep disturbances due to breathing, tachycardia, light-headedness, exercise intolerance and edema.   Gastrointestinal:  Negative for heartburn, nausea, vomiting, abdominal pain, diarrhea, constipation, blood in stool, melena, rectal pain, bloating, hemorrhoids, bowel incontinence, jaundice, rectal bleeding, coffee ground emesis and change in stool.   Genitourinary:  Negative for bladder incontinence, dysuria, urgency, hematuria, flank pain, difficulty urinating, nocturia and voiding less frequently.   Skin:  Negative for nail changes, itching, poor wound healing, rash, hair changes, skin changes, acne, warts, poor wound healing, scarring, flaky skin, Raynaud's phenomenon, sensitivity to sunlight and skin thickening.   Neurological:  Negative for light-headedness.   Psychiatric/Behavioral:  Negative for hallucinations.    Breast:  Negative for breast discharge, breast mass, breast pain and nipple retraction.   Endocrine:  Negative for altered temperature regulation, polyphagia, polydipsia, unwanted hair growth and change in facial hair.    [unfilled]  PHQ-9 SCORE 7/17/2019 8/19/2019 1/27/2021   PHQ-9 Total Score 5 0 4     ARMEN-7 SCORE 7/17/2019 8/19/2019 1/27/2021   Total Score 3 0 3       EXAM:  Blood pressure 106/71, pulse 74, height 1.676 m (5' 5.98\"), weight 58.7 kg (129 lb 6.4 oz), last menstrual period 12/27/2020, not currently breastfeeding. Body mass index is 20.9 kg/m .  General - pleasant female in no acute distress.  Skin - no suspicious lesions or rashes  EENT-  PERRLA, euthyroid with out palpable nodules  Neck - supple without lymphadenopathy.  Lungs - clear to auscultation bilaterally.  Heart - regular rate and rhythm without " murmur.  Abdomen - soft, nontender, nondistended, no masses or organomegaly noted.  Musculoskeletal - no gross deformities.  Neurological - normal strength, sensation, and mental status.    Breast Exam:  Breast: Without visible skin changes. No dimpling or lesions seen.   Breasts supple, non-tender with palpation, no dominant mass, nodularity, or nipple discharge noted bilaterally. Axillary nodes negative.      Pelvic Exam:  EG/BUS: Normal genital architecture without lesions, erythema or abnormal secretions Bartholin's, Urethra, [place]'s normal  Urethral meatus: normal   Urethra: no masses, tenderness, or scarring   Bladder: no masses or tenderness   Vagina: moist, pink, rugae with creamy, white and odorless  secretions  Cervix: pink, moist, closed, without lesion or CMT and one IUD string extend 2-3 cm from external os.  Uterus: small, smooth, firm, mobile w/o pain  Adnexa: Within normal limits and No masses, nodularity, tenderness  Rectum:anus normal       ASSESSMENT:  Encounter Diagnoses   Name Primary?     Visit for preventive health examination Yes     Screening for lipoid disorders      Screening for thyroid disorder         PLAN:   Orders Placed This Encounter   Procedures     Lipid panel reflex to direct LDL Fasting     TSH with free T4 reflex     Orders Placed This Encounter   Medications     multivitamin w/minerals (THERA-VIT-M) tablet     Sig: Take 1 tablet by mouth daily     - Accepting of referral to [facility] mental health providers. Message sent to Charline Morales and Shayla Luu to help patient establish care.   - Encouraged too track right side discomfort. Reviewed if midcycle its likely mittleschmerz. Call if worsening or bothersome.    - Return to lab for TSH and lipids when fasting.     Additional teaching done at this visit regarding calcium (1200 mg per day), self breast awareness, birth control and mental health.    Return to clinic in one year.  Follow-up as needed.    HÉCTOR Khan, CNM

## 2021-01-27 NOTE — PATIENT INSTRUCTIONS

## 2021-01-28 ASSESSMENT — ANXIETY QUESTIONNAIRES: GAD7 TOTAL SCORE: 3

## 2021-02-10 ENCOUNTER — VIRTUAL VISIT (OUTPATIENT)
Dept: PSYCHOLOGY | Facility: CLINIC | Age: 31
End: 2021-02-10
Attending: PSYCHOLOGIST
Payer: COMMERCIAL

## 2021-02-10 DIAGNOSIS — F41.9 ANXIETY DISORDER, UNSPECIFIED TYPE: Primary | ICD-10-CM

## 2021-02-10 PROCEDURE — 90791 PSYCH DIAGNOSTIC EVALUATION: CPT | Mod: TEL

## 2021-02-10 NOTE — PROGRESS NOTES
"  Name:  Lindsey Giles  Mrn: [id number]  Date of visit: 2/10/2021    PSYCHOLOGY OUTPATIENT VISIT NOTE       The patient has been notified of the following:      \"We have found that certain health care needs can be provided without the need for a face to face visit.  This service lets us provide the care you need with a phone conversation.       I will have full access to your [facility] medical record during this entire phone call.   I will be taking notes for your medical record.      Since this is like an office visit, we will bill your insurance company for this service.       Confidentiality still applies for telephone services, and nobody will record the visit.  It is important to be in a quiet, private space that is free of distractions  during the visit.??     telehealth visit appropriate.      Referral Source:  Maya Baca CNM, [facility]    PRESENTING PROBLEMS/SYMPTOMS:  Much stress in past year.  Have been thinking about seeking out counseling for awhile, but have had other things come up.  Want to learn how to cope better with life stressors.  Stressors:  Underlying anxiety around pandemic and effects on daily life of self and family, lack of competent leadership nationally,  stress of trying to be a good mom while working (having her own kids watch screens),  losing money in gambling, learned about this in Jan 2021.  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  Parents remain  and in [place] area, and they're planning to move to [place] in 2021.  2 older bros, [place] and [place].     - 2016, male.  In past year he lost a lot of money in gambling.  His parents were alcoholic and \"he has had a complicated relationship with alcohol.\"  His decision to cut " "out alcohol and she is partnering with him on this.    Kids - 3yo and 1.4 yo boys.  Older son in   Living in own home in [facility] [facility].    Education - Masters in Art Therapy, [employer].    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  Plan to quit job for a period, figure out what's next.  Last day is 3/5.      Mental Health history:  End of highschool - family therapy, [dad had \"mental breakdown\" depression and incarcerated for 2.5 yrs (poor decisions in his business)].  In college, depression, took lexapro 8-12mos, some counseling, poor rapport so few sessions.  In grad school, academic stressors, had 5 sessions.        Family Mental Health history:  Dad - depression.  Mom and brother have anxiety.      Medical History:  Denied current problems.  Hx of  with second baby.  Taking multivitamin.      Health Behaviors  Alcohol - decision with  to cut back. 1 serving/ 3wks.  Denied hx of problems.  Drugs - Current use Denied.  Experimental marijuana in remote past.  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  Sometimes look for something to worry about, hard to feel content and relaxed.    Irritability - tolerance lower so less patience.    SI - denied  HI - denied    ASSESSMENT:   Engaged in visit, crying when recounting 's gambling behavior.      Mental Status Assessment:   Appearance:   unknown  Eye Contact:   n/a  Psychomotor Behavior: unknown  Attitude:   Cooperative   Orientation:   All  Speech   Rate / Production: Normal    Volume:  Normal " "  Mood:    Anxious, dysphoric, tearful  Thought Content:  Clear   Thought Form:  Coherent  Logical   Insight:    Fair     DIAGNOSIS:   Axis I:  Unspecified anxiety disorder; rule out mood disorder.    Axis II:  deferred  Axis III:  None  Axis IV:  occupational  Axis V:  GAF current = 51-60    Summary and Impressions:  Dalia Giles is 31yo,  (male) and reporting increasing anxiety.  Stressors identified over past year include pandemic and effects on daily life of self and family, national politics, balancing roles of mom and employee,  losing money in gambling (learned about this in Jan 2021).  In past week submitted job resignation and this was a positive step.  History notable for dad's \"breakdown\" and incarceration during Ms Giles's last yr of highschool; depression and anxiety in adulthood with brief psychotherapy and medication.  She is not interested in referral for medications today but is interested in psychotherapy.  Psychotherapy is recommended and she understands how to schedule follow-up visits.       PLAN:    Patient to schedule followup visit.       Total time spent equals 55 minutes, individual psychotherapy.  2:00-55             "

## 2021-03-03 ENCOUNTER — VIRTUAL VISIT (OUTPATIENT)
Dept: PSYCHOLOGY | Facility: CLINIC | Age: 31
End: 2021-03-03
Payer: COMMERCIAL

## 2021-03-03 DIAGNOSIS — F41.1 GAD (GENERALIZED ANXIETY DISORDER): Primary | ICD-10-CM

## 2021-03-03 DIAGNOSIS — F32.A DEPRESSION, UNSPECIFIED DEPRESSION TYPE: ICD-10-CM

## 2021-03-03 PROCEDURE — 90837 PSYTX W PT 60 MINUTES: CPT | Mod: TEL

## 2021-03-03 NOTE — PROGRESS NOTES
"  Name:  Lindsey Giles  Mrn: [id number]  Date of visit: 3/3/2021    PSYCHOLOGY OUTPATIENT VISIT NOTE - telephone     Telehealth visit appropriate.  The author of this note documented a reason for not sharing it with the patient.      PRESENTING PROBLEMS/SYMPTOMS:  Anxiety with associated irritability, concentration problems, muscle tension (chest), insomnia.  Identified as a problem, several yrs.  Worry in multiple domains: politics, pandemic, kids, , job.  Anhedonia, lethargy, feelings of guilt. (2 sons)    INTERVENTION AND RESPONSE:  Cognitive Behavioral therapy, individual.   This is patient's first psychotherapy visit following the initial psychological assessment.  Patient presented with Crying - less motivating or excitement and enjoyment.  Sometimes use food as a treat, having something to look forward to.  Crying talking about self esteem, making time for self hard.  Crying talking about physical intimacy with H, hard to focus and relax.    ASSESSMENT:   Somewhat tearful.  Engaged in treatment planning.  Continuing context:  End of [facility] - family therapy, [dad had \"mental breakdown\" depression and incarcerated for 2.5 yrs (poor decisions in his business)]. In past year  lost a lot of money in gambling.  His parents were alcoholic and \"he has had a complicated relationship with alcohol.\"  His decision to cut out alcohol and she is partnering with him on this.    Mental Status Assessment:  Appearance:   unknown  Psychomotor Behavior: n/a  Attitude:   unknown  Orientation:   All  Speech   Rate / Production: Normal    Volume:  Normal   Mood:    Anxious  Sad   Thought Content:  Clear   Thought Form:  Coherent  Logical   Insight:    Fair     DIAGNOSIS:  ARMEN, unspecified depression    PLAN:    Patient to schedule followup visit.       Total time spent equals 58 minutes, individual psychotherapy.      2:02-3:00         "

## 2021-03-17 ENCOUNTER — VIRTUAL VISIT (OUTPATIENT)
Dept: PSYCHOLOGY | Facility: CLINIC | Age: 31
End: 2021-03-17
Payer: COMMERCIAL

## 2021-03-17 DIAGNOSIS — F32.A DEPRESSION, UNSPECIFIED DEPRESSION TYPE: ICD-10-CM

## 2021-03-17 DIAGNOSIS — F41.1 GAD (GENERALIZED ANXIETY DISORDER): Primary | ICD-10-CM

## 2021-03-17 PROCEDURE — 90837 PSYTX W PT 60 MINUTES: CPT | Mod: TEL

## 2021-03-17 NOTE — PROGRESS NOTES
"  Name:  Lindsey Giles  Mrn: [id number]  Date of visit: 3/17/2021    PSYCHOLOGY OUTPATIENT VISIT NOTE - telephone     Telehealth visit appropriate.  The author of this note documented a reason for not sharing it with the patient.    Treatment Plan: 3/17/21  PRESENTING PROBLEMS/SYMPTOMS:  Anxiety with associated irritability, concentration problems, muscle tension (chest), insomnia.  Identified as a problem, several yrs.  Worry in multiple domains: politics, pandemic, kids, , job.  Anhedonia, lethargy, feelings of guilt. (2 sons)  INTERVENTION AND RESPONSE:  Cognitive Behavioral therapy, individual.   Last day of work 3/5: freed up time and mental space.  Recognizing have had consistent external stressors and association with anxiety, also internal stressors.  Wanting to build back up energy and motivation.  Changing job will change problem.  Difficulty with emotional boundaries, others' negative emotions.  (crying)  If everyone is good, I'm good too.  Pattern of people pleasing.  E.g. not acknowledge what I want , where to go for dinner.  Change beliefs: My house will never be clean, I'll never be a real artist.    Mindfulness: daily walks, meditation, vitamin   Completed tx plan.    ASSESSMENT:   Somewhat tearful.  Engaged in treatment planning.  Continuing context:  [facility], [dad had \"mental breakdown\" depression and incarcerated for 2.5 yrs (poor decisions in his business)]. In past year  lost a lot of money in gambling.  His parents were alcoholic and \"he has had a complicated relationship with alcohol.\"  His decision to cut out alcohol and she is partnering with him on this.    Mental Status Assessment:  Appearance:   unknown  Psychomotor Behavior: n/a  Attitude:   unknown  Orientation:   All  Speech   Rate / Production: Normal    Volume:  Normal   Mood:    Anxious  Sad tearful  Thought Content:  Clear   Thought Form:  Coherent  Logical   Insight:    Fair     DIAGNOSIS: "  ARMEN, unspecified depression    PLAN:    Patient to schedule followup visit.       Total time spent equals 53 minutes, individual psychotherapy.      2:07-3:00

## 2021-03-24 ENCOUNTER — VIRTUAL VISIT (OUTPATIENT)
Dept: PSYCHOLOGY | Facility: CLINIC | Age: 31
End: 2021-03-24
Payer: COMMERCIAL

## 2021-03-24 DIAGNOSIS — F41.1 GAD (GENERALIZED ANXIETY DISORDER): Primary | ICD-10-CM

## 2021-03-24 DIAGNOSIS — F32.A DEPRESSION, UNSPECIFIED DEPRESSION TYPE: ICD-10-CM

## 2021-03-24 PROCEDURE — 90837 PSYTX W PT 60 MINUTES: CPT | Mod: TEL

## 2021-03-24 NOTE — PROGRESS NOTES
"  Name:  Lindsey Giles  Mrn: [id number]  Date of visit: 3/24/2021    PSYCHOLOGY OUTPATIENT VISIT NOTE - telephone     Telehealth visit appropriate.  The author of this note documented a reason for not sharing it with the patient.    Treatment Plan: 3/17/21  PRESENTING PROBLEMS/SYMPTOMS:  Anxiety with associated irritability, concentration problems, muscle tension (chest), insomnia.  Identified as a problem, several yrs.  Worry in multiple domains: politics, pandemic, kids, , job.  Anhedonia, lethargy, feelings of guilt. (2 sons: Jerrell 5yo, Michi 1yo;  = Bucky)  INTERVENTION AND RESPONSE:  Cognitive Behavioral therapy, individual.   Glad no longer at job, grateful not to have stress, enjoying kids, but wondering if productive enough now, finances.  Resisting self critical thoughts.  More aware of emotions with counseling.  Want to be introspective but not get all consuming.  Want sense of ease and not all \"work.\"  Balance been pretty ok.  Conversation with H (crying), Dad incarcerated while in HS/college, boyfriends call her overly emotional or dramatic.  H told her she's not too emotional.    With dad incarcerated and away at school, [employer], looked to friends and boyfriends to support her, but they really couldn't.  Not at ease at home or at school.    Parents visit this weekend, looking forward.  (parents in [place] and plan to move to [place]).    (previous visit: Difficulty with emotional boundaries, others' negative emotions.  If everyone is good, I'm good too.  Pattern of people pleasing.  E.g. not acknowledge what I want , where to go for dinner.  Change beliefs: My house will never be clean, I'll never be a real artist.    ASSESSMENT:   Somewhat tearful. Recognizing importance of dad's mental health break and incarceration.  Continuing context:  End of highschool - family therapy, [dad had \"mental breakdown\" depression and incarcerated for 2.5 yrs (poor decisions in his business)]. In past year " " lost a lot of money in gambling.  His parents were alcoholic and \"he has had a complicated relationship with alcohol.\"  His decision to cut out alcohol and she is partnering with him on this.    Mental Status Assessment:  Appearance:   unknown  Psychomotor Behavior: n/a  Attitude:   unknown  Orientation:   All  Speech   Rate / Production: Normal    Volume:  Normal   Mood:    Anxious  Sad tearful  Thought Content:  Clear   Thought Form:  Coherent  Logical   Insight:    Fair     DIAGNOSIS:  ARMEN, unspecified depression    PLAN:    Patient to schedule followup visit.       Total time spent equals 54 minutes, individual psychotherapy.      2:02-56         "

## 2021-03-31 ENCOUNTER — VIRTUAL VISIT (OUTPATIENT)
Dept: PSYCHOLOGY | Facility: CLINIC | Age: 31
End: 2021-03-31
Attending: FAMILY MEDICINE
Payer: COMMERCIAL

## 2021-03-31 DIAGNOSIS — F41.1 GAD (GENERALIZED ANXIETY DISORDER): Primary | ICD-10-CM

## 2021-03-31 DIAGNOSIS — F32.A DEPRESSION, UNSPECIFIED DEPRESSION TYPE: ICD-10-CM

## 2021-03-31 PROCEDURE — 90837 PSYTX W PT 60 MINUTES: CPT | Mod: TEL

## 2021-03-31 NOTE — PROGRESS NOTES
"  Name:  Lindsey Giles  Mrn: [id number]  Date of visit: 3/31/2021    PSYCHOLOGY OUTPATIENT VISIT NOTE - telephone     Telehealth visit appropriate.  The author of this note documented a reason for not sharing it with the patient.    Treatment Plan: 3/17/21  PRESENTING PROBLEMS/SYMPTOMS:  Anxiety with associated irritability, concentration problems, muscle tension (chest), insomnia.  Identified as a problem, several yrs.  Worry in multiple domains: politics, pandemic, kids, , job.  Anhedonia, lethargy, feelings of guilt. (2 sons: Jerrell 5yo, Michi 1yo;  = Bucky)  INTERVENTION AND RESPONSE:  Cognitive Behavioral therapy, individual.   Good week, new routine, parents' visit.  Recognizing \"not good enough\" and then hard on self for having that thought. Working on \"not attaching\" to thoughts.  Communication with mom about working on project, clarifying mom's perspective via mom's words. Memory of dad's depression and SI, (crying).  Want to be free of sadness and heaviness of this hx.  How could dad want to leave us?  Wanting to support H with his mom's suicide.  Hard to let things go, big and small, give self permission.  Something still stuck in hx with dad.  Deep dark secret, dad had been imprisoned.  Always had special relationship with dad, we get each other; also close with mom too.  Feeling grateful and bitter and resentful.  Importance of space for all feelings.    HW:  Give self permission for experience of emotions; making choices and decisions       (parents in [place] and plan to move to [place]).    (previous visit: Difficulty with emotional boundaries, others' negative emotions.  If everyone is good, I'm good too.  Pattern of people pleasing.  E.g. not acknowledge what I want , where to go for dinner.  Change beliefs: My house will never be clean, I'll never be a real artist.    ASSESSMENT:   Somewhat tearful intermittently, talking about dad.  Continuing context:  [facility], " "[dad had \"mental breakdown\" depression with SI and hospitalized then incarcerated for 2.5 yrs (poor decisions in his business)]. In past year  lost a lot of money in gambling.  His parents were alcoholic and \"he has had a complicated relationship with alcohol.\"  His decision to cut out alcohol and she is partnering with him on this.  His mom committed suicide.    Mental Status Assessment:  Appearance:   unknown  Psychomotor Behavior: n/a  Attitude:   unknown  Orientation:   All  Speech   Rate / Production: Normal    Volume:  Normal   Mood:    Anxious  Sad tearful  Thought Content:  Clear   Thought Form:  Coherent  Logical   Insight:    Fair     DIAGNOSIS:  ARMEN, unspecified depression  PLAN:    Patient to schedule followup visit.       Total time spent equals 54 minutes, individual psychotherapy.      2:00-54         "

## 2021-04-07 ENCOUNTER — VIRTUAL VISIT (OUTPATIENT)
Dept: PSYCHOLOGY | Facility: CLINIC | Age: 31
End: 2021-04-07
Attending: FAMILY MEDICINE
Payer: COMMERCIAL

## 2021-04-07 DIAGNOSIS — F41.1 GAD (GENERALIZED ANXIETY DISORDER): ICD-10-CM

## 2021-04-07 DIAGNOSIS — F32.A DEPRESSION, UNSPECIFIED DEPRESSION TYPE: Primary | ICD-10-CM

## 2021-04-07 PROCEDURE — 90837 PSYTX W PT 60 MINUTES: CPT | Mod: TEL

## 2021-04-07 NOTE — PROGRESS NOTES
"  Name:  Lindsey Giles  Mrn: [id number]  Date of visit: 4/7/2021    PSYCHOLOGY OUTPATIENT VISIT NOTE - telephone     Telehealth visit appropriate.  The author of this note documented a reason for not sharing it with the patient.    Treatment Plan: 3/17/21  PRESENTING PROBLEMS/SYMPTOMS:  Anxiety with associated irritability, concentration problems, muscle tension (chest), insomnia.  Identified as a problem, several yrs.  Worry in multiple domains: politics, pandemic, kids, , job.  Anhedonia, lethargy, feelings of guilt. (2 sons: Jerrell 3yo, Michi 1yo;  = Bucky)  INTERVENTION AND RESPONSE:  Cognitive Behavioral therapy, individual.   Able to engage in mindfulness with son today, playing in nature.  Feeling more motivated to spend this time with kids.  Enjoying.  Practicing gratitude with meditation mariela.  But more uncomfortable with deeper therapy work (crying).  Taking space on Wed afternoon and tavon and some \"foggy and cloudy\" into Thurs.  Should I do this work now, is this what I should bring up I therapy?  Knowing \"yes.\"  So much good in own childhood and also dad's suicide attempt and incarceration.  So much sadness for dad that he didn't want to live anymore.  So sad and hard to be  from him, worried.  But not angry at dad ever.  So blindsided.  Dad in hospital and then FBI came to house and learned about the illegal behavior by dad.  Sudden change in finances, still always worried about money.  That was hard for me, but can't stay with me, think about how it was for mom and bros (pattern).  If I admit it's hard for me I'm being ungrateful (not grateful for dad being alive, just want to be grateful for what I do have).  Possibility of being grateful and un grateful, to consider.        (parents in [place] and plan to move to [place]).  (previous visit:  Something still stuck in hx with dad.  Deep dark secret, dad had been imprisoned.  Always had special relationship with dad, we get each other; " "also close with mom too.  Difficulty with emotional boundaries, others' negative emotions.  If everyone is good, I'm good too.  Pattern of people pleasing.  E.g. not acknowledge what I want , where to go for dinner.  Change beliefs: My house will never be clean, I'll never be a real artist.    ASSESSMENT:   Focus on being grateful for parents and dad being alive, not allowing for other feelings.  Continuing context:  End of [facility] - family therapy, [dad had \"mental breakdown\" depression with SI and hospitalized then incarcerated for 2.5 yrs (poor decisions in his business)]. In past year  lost a lot of money in gambling.  His parents were alcoholic and \"he has had a complicated relationship with alcohol.\"  His decision to cut out alcohol and she is partnering with him on this.  His mom committed suicide.    Mental Status Assessment:  Appearance:   unknown  Psychomotor Behavior: n/a  Attitude:   unknown  Orientation:   All  Speech   Rate / Production: Normal    Volume:  Normal   Mood:    Anxious  Sad tearful  Thought Content:  Clear   Thought Form:  Coherent  Logical   Insight:    Fair     DIAGNOSIS:  unspecified depression; ARMEN  PLAN:    Patient to schedule followup visit.       Total time spent equals 55 minutes, individual psychotherapy. telephone    2:02-57         "

## 2021-04-14 ENCOUNTER — VIRTUAL VISIT (OUTPATIENT)
Dept: PSYCHOLOGY | Facility: CLINIC | Age: 31
End: 2021-04-14
Attending: FAMILY MEDICINE
Payer: COMMERCIAL

## 2021-04-14 DIAGNOSIS — F41.1 GAD (GENERALIZED ANXIETY DISORDER): Primary | ICD-10-CM

## 2021-04-14 DIAGNOSIS — F32.A DEPRESSION, UNSPECIFIED DEPRESSION TYPE: ICD-10-CM

## 2021-04-14 PROCEDURE — 90837 PSYTX W PT 60 MINUTES: CPT | Mod: TEL

## 2021-04-14 NOTE — PROGRESS NOTES
Name:  Lindsey Giles  Mrn: [id number]  Date of visit: 4/14/2021    PSYCHOLOGY OUTPATIENT VISIT NOTE - telephone     Telehealth visit appropriate.  The author of this note documented a reason for not sharing it with the patient.    Treatment Plan: 3/17/21  PRESENTING PROBLEMS/SYMPTOMS:  Anxiety with associated irritability, concentration problems, muscle tension (chest), insomnia.  Identified as a problem, several yrs.  Worry in multiple domains: politics, pandemic, kids, , job.  Anhedonia, lethargy, feelings of guilt. (2 sons: Jerrell 5yo, Michi 3yo;  = Bucky)  INTERVENTION AND RESPONSE:  Cognitive Behavioral therapy, individual.   Got 1st vax dose.  Patterns from last visit: stay in gratitude or focus on empathizing with others' emotions.  Possibility that these patterns sometimes serve to avoid other more difficult emotions.  Recognizing quit job to make space of other aspects of life.  Options of making space all emotions.  Hx: told self to focus on gratitudes during hard times as a child, helped.  Now tell self can't be annoyed with mom because of what mom's experienced, can only be grateful for mom.  Guilt if annoyed with mom.  Anxiety if have a different opinion from parents, e.g. parenting.  Probe: I can trust myself, others don't need to validate my decision.  Pt: liberating, but does it mean I disrespect my mom?  I can give myself that permission.  Put self and own wellbeing first.  Empowerment.  Energy, peace. Power and owning taking care of self.        (parents in [place] and plan to move to [place]).  (previous visit:  Something still stuck in hx with dad.  Deep dark secret, dad had been imprisoned.  Always had special relationship with dad, we get each other; also close with mom too.  Difficulty with emotional boundaries, others' negative emotions.  If everyone is good, I'm good too.  Pattern of people pleasing.  E.g. not acknowledge what I want , where to go for dinner.  Change beliefs: My  "house will never be clean, I'll never be a real artist.    ASSESSMENT:   Opening to idea of more range of emotions.  Feeling more confident with choice to quit job. Continuing context:  End of highschool - family therapy, [dad had \"mental breakdown\" depression with SI and hospitalized then incarcerated for 2.5 yrs (poor decisions in his business)]. In past year  lost a lot of money in gambling.  His parents were alcoholic and \"he has had a complicated relationship with alcohol.\"  His decision to cut out alcohol and she is partnering with him on this.  His mom committed suicide.    Mental Status Assessment:  Appearance:   unknown  Psychomotor Behavior: n/a  Attitude:   unknown  Orientation:   All  Speech   Rate / Production: Normal    Volume:  Normal   Mood:    Anxious  Sad   Thought Content:  Clear   Thought Form:  Coherent  Logical   Insight:    Fair     DIAGNOSIS:   ARMEN; unspecified depression;  PLAN:    Patient to schedule followup visit.       Total time spent equals 54 minutes, individual psychotherapy. telephone    2:01-55         "

## 2021-04-28 ENCOUNTER — VIRTUAL VISIT (OUTPATIENT)
Dept: PSYCHOLOGY | Facility: CLINIC | Age: 31
End: 2021-04-28
Attending: FAMILY MEDICINE
Payer: COMMERCIAL

## 2021-04-28 DIAGNOSIS — F41.1 GAD (GENERALIZED ANXIETY DISORDER): Primary | ICD-10-CM

## 2021-04-28 DIAGNOSIS — F32.A DEPRESSION, UNSPECIFIED DEPRESSION TYPE: ICD-10-CM

## 2021-04-28 PROCEDURE — 90834 PSYTX W PT 45 MINUTES: CPT | Mod: TEL

## 2021-04-28 NOTE — PROGRESS NOTES
Name:  Lindsey Giels  Mrn: [id number]  Date of visit: 4/28/2021    PSYCHOLOGY OUTPATIENT VISIT NOTE - telephone     Telehealth visit appropriate.  The author of this note documented a reason for not sharing it with the patient.    Treatment Plan: 3/17/21  PRESENTING PROBLEMS/SYMPTOMS:  Anxiety with associated irritability, concentration problems, muscle tension (chest), insomnia.  Identified as a problem, several yrs.  Worry in multiple domains: politics, pandemic, kids, , job.  Anhedonia, lethargy, feelings of guilt. (2 sons: Jerrell 3yo, Michi 3yo;  = Bucky)  INTERVENTION AND RESPONSE:  Cognitive Behavioral therapy, individual.   Plan to get next vax on 5/11.  Pt shared decision to discontinue therapy, wanting to process issues on her own.  Taking space.  Discussed and option to do a maintenance plan  With check in in the summer.  Pt to consider and schedule accordingly.  Importance of listening to inner wisdom and speaking truth (as she is doing around her therapy).  Listening to self in big and small ways.  Increasing ease.  Choosing to accept and allow.  E.g. M not taking much nap, don't make that be about me.  Allowing for all feelings in the moment.  Allowance, acceptance, letting go and letting things be.  E.g. talking to Jerrell, annoyance, should have done different, not be impatient.  Now, Letting it go, doesn't build up.  Give self permission to have bad moods or arguments with H, not have to hold onto it.  Calm and content.  Release in mind and less tension in my body.  Image of the river, blue, calm, flow, direction, strength.          (parents in [place] and plan to move to [place]).  (previous visit:  Something still stuck in hx with dad.  Deep dark secret, dad had been imprisoned.  Always had special relationship with dad, we get each other; also close with mom too.  Difficulty with emotional boundaries, others' negative emotions.  If everyone is good, I'm good too.  Pattern of people  "pleasing.  E.g. not acknowledge what I want , where to go for dinner.  Change beliefs: My house will never be clean, I'll never be a real artist.    ASSESSMENT:   Opening to idea of more range of emotions.  Allowing for impatience and recognizing still ok, can move on.  Increased self compassion. Anxiety and depressed mood appeared to be connected to not allowing self to have negative emotoins.   Continuing context:  End of [facility] - family therapy, [dad had \"mental breakdown\" depression with SI and hospitalized then incarcerated for 2.5 yrs (poor decisions in his business)]. In past year  lost a lot of money in gambling.  His parents were alcoholic and \"he has had a complicated relationship with alcohol.\"  His decision to cut out alcohol and she is partnering with him on this.  His mom committed suicide.    Mental Status Assessment:  Appearance:   unknown  Psychomotor Behavior: n/a  Attitude:   unknown  Orientation:   All  Speech   Rate / Production: Normal    Volume:  Normal   Mood:    euthymic  Thought Content:  Clear   Thought Form:  Coherent  Logical   Insight:    Fair     DIAGNOSIS:   ARMEN; unspecified depression;  PLAN:    Pt identifying readiness to discontinue therapy and wanting to do work on her own.  Reasonable decision based on her selfreport.  She understands how to schedule a maintenance check-in for Summer or to contact me via [place] as needed.     Total time spent equals 49 minutes, individual psychotherapy. telephone    2:01-50         "

## 2021-10-02 ENCOUNTER — ALLIED HEALTH/NURSE VISIT (OUTPATIENT)
Dept: FAMILY MEDICINE | Facility: CLINIC | Age: 31
End: 2021-10-02
Payer: COMMERCIAL

## 2021-10-02 DIAGNOSIS — Z23 NEED FOR PROPHYLACTIC VACCINATION AND INOCULATION AGAINST INFLUENZA: Primary | ICD-10-CM

## 2021-10-02 PROCEDURE — 90686 IIV4 VACC NO PRSV 0.5 ML IM: CPT

## 2021-10-02 PROCEDURE — 90471 IMMUNIZATION ADMIN: CPT

## 2021-10-02 PROCEDURE — 99207 PR NO CHARGE NURSE ONLY: CPT

## 2022-02-23 ENCOUNTER — TELEPHONE (OUTPATIENT)
Dept: OBGYN | Facility: CLINIC | Age: 32
End: 2022-02-23
Payer: COMMERCIAL

## 2022-02-23 NOTE — TELEPHONE ENCOUNTER
----- Message from Ariana George MD sent at 2/22/2022  7:26 PM CST -----  Regarding: appt 2/24  This pt has appt with me but normally seen on 3rd floor/    I am fine seeing her but please make sure she wants to be in our clinic and not downstairs. Thanks  Marga

## 2022-02-24 ENCOUNTER — OFFICE VISIT (OUTPATIENT)
Dept: OBGYN | Facility: CLINIC | Age: 32
End: 2022-02-24
Payer: COMMERCIAL

## 2022-02-24 VITALS
DIASTOLIC BLOOD PRESSURE: 87 MMHG | SYSTOLIC BLOOD PRESSURE: 141 MMHG | BODY MASS INDEX: 22.98 KG/M2 | WEIGHT: 143 LBS | HEIGHT: 66 IN

## 2022-02-24 DIAGNOSIS — Z01.419 ENCOUNTER FOR GYNECOLOGICAL EXAMINATION WITHOUT ABNORMAL FINDING: Primary | ICD-10-CM

## 2022-02-24 DIAGNOSIS — Z87.59 HISTORY OF GESTATIONAL HYPERTENSION: ICD-10-CM

## 2022-02-24 DIAGNOSIS — Z13.0 SCREENING, ANEMIA, DEFICIENCY, IRON: ICD-10-CM

## 2022-02-24 DIAGNOSIS — Z13.220 SCREENING FOR LIPID DISORDERS: ICD-10-CM

## 2022-02-24 DIAGNOSIS — Z13.29 SCREENING FOR THYROID DISORDER: ICD-10-CM

## 2022-02-24 DIAGNOSIS — Z30.432 ENCOUNTER FOR REMOVAL OF INTRAUTERINE CONTRACEPTIVE DEVICE: ICD-10-CM

## 2022-02-24 PROBLEM — O32.2XX0 TRANSVERSE PRESENTATION, ANTEPARTUM, SINGLE OR UNSPECIFIED FETUS: Status: RESOLVED | Noted: 2019-05-08 | Resolved: 2022-02-24

## 2022-02-24 PROBLEM — O99.019 ANTEPARTUM ANEMIA: Status: RESOLVED | Noted: 2019-02-20 | Resolved: 2022-02-24

## 2022-02-24 PROBLEM — Z98.891 S/P CESAREAN SECTION: Status: RESOLVED | Noted: 2019-05-15 | Resolved: 2022-02-24

## 2022-02-24 LAB
CREAT UR-MCNC: 26 MG/DL
ERYTHROCYTE [DISTWIDTH] IN BLOOD BY AUTOMATED COUNT: 12.2 % (ref 10–15)
HCT VFR BLD AUTO: 39.6 % (ref 35–47)
HGB BLD-MCNC: 13.2 G/DL (ref 11.7–15.7)
MCH RBC QN AUTO: 30.1 PG (ref 26.5–33)
MCHC RBC AUTO-ENTMCNC: 33.3 G/DL (ref 31.5–36.5)
MCV RBC AUTO: 90 FL (ref 78–100)
PLATELET # BLD AUTO: 187 10E3/UL (ref 150–450)
PROT UR-MCNC: <0.05 G/L
PROT/CREAT 24H UR: NORMAL MG/G{CREAT}
RBC # BLD AUTO: 4.38 10E6/UL (ref 3.8–5.2)
WBC # BLD AUTO: 7.4 10E3/UL (ref 4–11)

## 2022-02-24 PROCEDURE — 99385 PREV VISIT NEW AGE 18-39: CPT | Mod: 25 | Performed by: OBSTETRICS & GYNECOLOGY

## 2022-02-24 PROCEDURE — 84156 ASSAY OF PROTEIN URINE: CPT | Performed by: OBSTETRICS & GYNECOLOGY

## 2022-02-24 PROCEDURE — 84443 ASSAY THYROID STIM HORMONE: CPT | Performed by: OBSTETRICS & GYNECOLOGY

## 2022-02-24 PROCEDURE — 58301 REMOVE INTRAUTERINE DEVICE: CPT | Performed by: OBSTETRICS & GYNECOLOGY

## 2022-02-24 PROCEDURE — 80061 LIPID PANEL: CPT | Performed by: OBSTETRICS & GYNECOLOGY

## 2022-02-24 PROCEDURE — 36415 COLL VENOUS BLD VENIPUNCTURE: CPT | Performed by: OBSTETRICS & GYNECOLOGY

## 2022-02-24 PROCEDURE — 85027 COMPLETE CBC AUTOMATED: CPT | Performed by: OBSTETRICS & GYNECOLOGY

## 2022-02-24 ASSESSMENT — PATIENT HEALTH QUESTIONNAIRE - PHQ9: SUM OF ALL RESPONSES TO PHQ QUESTIONS 1-9: 4

## 2022-02-24 NOTE — PATIENT INSTRUCTIONS
Lifestyle recommendations when attempting pregnancy    Limit caffeine less than 300 mg/day  Alcohol less than 2 drinks per day  Exercise is fine, regular and moderate  Take multivitamin or prenatal vitamin daily  (Folic Acid 400 mcg per day)    Baby aspirin daily 81 mg

## 2022-02-24 NOTE — PROGRESS NOTES
Lindsey is a 31 year old  female who presents for annual exam.     Menses are regular q 28-30 days and normal lasting 5 days.  Menses flow: normal and light.  Patient's last menstrual period was 2022 (approximate).. Using IUD for contraception.  She is currently considering pregnancy.  Besides routine health maintenance, she has no other health concerns today . PHQ=4  Her boys are 5 and 3 y/o. Has kyleena IUD in place and would like removed today as they are thinking about a 3rd child. Had , then c/s for breech. Discussed good candidate for TOLAC. HTN first pregnancy.   GYNECOLOGIC HISTORY:  Menarche: 11  Age at first intercourse: 17 Number of lifetime partners: less than 6  Lindsey is sexually active with 1 male partner(s) and is currently in monogamous relationship with 1 partner.   History sexually transmitted infections:No STD history  STI testing offered?  declined   MERCEDEZ exposure: Unknown  History of abnormal Pap smear: NO - age 30- 65 PAP every 3 years recommended  Family history of breast CA: No  Family history of uterine/ovarian CA: No    Family history of colon CA: No    HEALTH MAINTENANCE:  Cholesterol: (No results found for: CHOL History of abnormal lipids: No  Mammo: no . History of abnormal Mammo: No.  Regular Self Breast Exams: No  Calcium/Vitamin D intake: source:  dietary supplement(s) Adequate? Yes  TSH: (No results found for: TSH )  Pap; (  Lab Results   Component Value Date    PAP NIL 10/29/2018    )    HISTORY:  OB History    Para Term  AB Living   2 2 2 0 0 2   SAB IAB Ectopic Multiple Live Births   0 0 0 0 2      # Outcome Date GA Lbr Maycol/2nd Weight Sex Delivery Anes PTL Lv   2 Term 05/15/19 39w0d  3.5 kg (7 lb 11.5 oz) M CS-LTranv   BELLA      Name: Mihci      Apgar1: 8  Apgar5: 9   1 Term /16 40w1d / 00:23 3.771 kg (8 lb 5 oz) M Vag-Spont EPI N BELLA      Birth Comments: IOL for GHTN.  Pitocin      Complications: Preeclampsia/Hypertension      Name: Jerrell       Obstetric Comments   No PPH, no GDM. +Gestational Hypertension with IOL - no Magnesium Sulfate     Past Medical History:   Diagnosis Date     History of depression     Situational in high school - Lexapro. No SI or hospitalizations     History of gestational hypertension      Past Surgical History:   Procedure Laterality Date      SECTION N/A 05/15/2019    Procedure: Primary  Section;  Surgeon: Liz Hicks MD;  Location:  L+D     Family History   Problem Relation Age of Onset     Musculoskeletal Disorder Mother      Multiple Sclerosis Mother 42        doing well,     Hypertension Mother      Fibroids Mother         hysterectomy     Thyroid Disease Mother         unsure of what- takes medication     Hyperlipidemia Mother      Diabetes Maternal Grandmother      Autism Spectrum Disorder Cousin      Autism Spectrum Disorder Other      Social History     Marital status:    Spouse name:   Number of children:   Years of education:   Highest education level:   Occupation:      Smoking status:   Smokeless tobacco:   Alcohol use:   Drug use:   Sexual activity:    Partners:   Other Topics     Social Determinants of Health     Financial Resource Strain: Not on file   Food Insecurity: Not on file   Transportation Needs: Not on file   Physical Activity: Not on file   Stress: Not on file   Social Connections: Not on file   Intimate Partner Violence: Not on file   Housing Stability: Not on file       Current Outpatient Medications:      levonorgestrel (KYLEENA) 19.5 MG IUD, 1 each by Intrauterine route once, Disp: , Rfl:      multivitamin w/minerals (THERA-VIT-M) tablet, Take 1 tablet by mouth daily, Disp: , Rfl:    No Known Allergies    Past medical, surgical, social and family history were reviewed and updated in EPIC.    EXAM:  BP (!) 141/87   Wt 64.9 kg (143 lb)   LMP 2022 (Approximate)   BMI 23.09 kg/m     BMI: Body mass index is 23.09 kg/m .  Constitutional: healthy, alert and no  distress  Head: Normocephalic. No masses, lesions, tenderness or abnormalities  Neck: Neck supple. Trachea midline. No adenopathy. Thyroid symmetric, normal size.   Cardiovascular: RRR.   Respiratory: Negative.   Breast: Breasts reveal mild symmetric fibrocystic densities, but there are no dominant, discrete, fixed or suspicious masses found.  Gastrointestinal: Abdomen soft, non-tender, non-distended. No masses, organomegaly.  :  Vulva:  No external lesions, normal female hair distribution, no inguinal adenopathy.    Urethra:  Midline, non-tender, well supported, no discharge  Vagina:  Moist, pink, no abnormal discharge, no lesions  Uterus:  Normal size, no IUD strings , non-tender, freely mobile  Ovaries:  No masses appreciated, non-tender, mobile  Rectal Exam: deferred  Musculoskeletal: extremities normal  Skin: no suspicious lesions or rashes  Psychiatric: Affect appropriate, cooperative,mentation appears normal.     Procedure:  After consent was obtained from the patient, IUD strings were grasped with alligator  forcep in cervix and IUD easily removed intact with minimal patient discomfort noted.  No bleeding noted.    COUNSELING:   Reviewed preventive health counseling, as reflected in patient instructions       Family planning       Folic Acid   reports that she has never smoked. She has never used smokeless tobacco.    Body mass index is 23.09 kg/m .    FRAX Risk Assessment    ASSESSMENT:  31 year old female with satisfactory annual exam  (Z01.419) Encounter for gynecological examination without abnormal finding  (primary encounter diagnosis)  Comment: condoms until ready for pregnancy  Plan: Protein  random urine        Has had elevated urine protein in the past, recheck today. Discussed baby ASA for pregnancy and baseline preE labs. Answered questions. Great TOLAC candidate.    (Z30.229) Encounter for removal of intrauterine contraceptive device  Comment: raisa  Plan: REMOVE INTRAUTERINE DEVICE         Removed easily and discussed condoms until desires conception.     (Z13.0) Screening, anemia, deficiency, iron  Plan: CBC with platelets        Check lab today    (Z13.220) Screening for lipid disorders  Comment: not fasting  Plan: Lipid panel reflex to direct LDL Non-fasting        Check lab today    (Z13.29) Screening for thyroid disorder  Comment: mother   Plan: TSH with free T4 reflex        Check lab today.      Ariana George MD

## 2022-02-26 LAB
CHOLEST SERPL-MCNC: 167 MG/DL
FASTING STATUS PATIENT QL REPORTED: NO
HDLC SERPL-MCNC: 52 MG/DL
LDLC SERPL CALC-MCNC: 101 MG/DL
NONHDLC SERPL-MCNC: 115 MG/DL
TRIGL SERPL-MCNC: 69 MG/DL
TSH SERPL DL<=0.005 MIU/L-ACNC: 1.2 MU/L (ref 0.4–4)

## 2022-09-11 ENCOUNTER — HEALTH MAINTENANCE LETTER (OUTPATIENT)
Age: 32
End: 2022-09-11

## 2022-09-29 ENCOUNTER — TRANSCRIBE ORDERS (OUTPATIENT)
Dept: MATERNAL FETAL MEDICINE | Facility: CLINIC | Age: 32
End: 2022-09-29

## 2022-09-29 ENCOUNTER — PRENATAL OFFICE VISIT (OUTPATIENT)
Dept: NURSING | Facility: CLINIC | Age: 32
End: 2022-09-29
Payer: COMMERCIAL

## 2022-09-29 VITALS — BODY MASS INDEX: 22.5 KG/M2 | HEIGHT: 66 IN | WEIGHT: 140 LBS

## 2022-09-29 DIAGNOSIS — Z23 NEED FOR TDAP VACCINATION: ICD-10-CM

## 2022-09-29 DIAGNOSIS — Z34.90 ENCOUNTER FOR SUPERVISION OF NORMAL PREGNANCY: Primary | ICD-10-CM

## 2022-09-29 DIAGNOSIS — O26.90 PREGNANCY RELATED CONDITION, ANTEPARTUM: Primary | ICD-10-CM

## 2022-09-29 PROCEDURE — 99207 PR NO CHARGE NURSE ONLY: CPT

## 2022-09-29 NOTE — PROGRESS NOTES
Important Information for Provider:     New ob nurse intake by phone, third pregnancy, history of 2 C sections. Handouts reviewed and mailed. Ordered genetic screening. History of gestational hypertension at labor first pregnancy 2017. Ultrasound scheduled for 10/21/2022 with CNM to call with results. MICHELLEB with Dr Elliott 11/11/2022     Prenatal OB Questionnaire  Patient supplied answers from flow sheet for:  Prenatal OB Questionnaire.  Past Medical History  Have you ever recieved care for your mental health? : (!) Yes  Have you ever been in a major accident or suffered serious trauma?: No  Within the last year, has anyone hit, slapped, kicked or otherwise hurt you?: No  In the last year, has anyone forced you to have sex when you didn't want to?: No    Past Medical History 2   Have you ever received a blood transfusion?: No  Would you accept a blood transfusion if was medically recommended?: Yes  Does anyone in your home smoke?: No   Is your blood type Rh negative?: No  Have you ever ?: (!) Yes  Have you been hospitalized for a nonsurgical reason excluding normal delivery?: No  Have you ever had an abnormal pap smear?: No    Past Medical History (Continued)  Do you have a history of abnormalities of the uterus?: No  Did your mother take MERCEDEZ or any other hormones when she was pregnant with you?: No  Do you have any other problems we have not asked about which you feel may be important to this pregnancy?: No                     Allergies as of 9/29/2022:    Allergies as of 09/29/2022     (No Known Allergies)       Current medications are:  No current outpatient medications on file.         Early ultrasound screening tool:    Does patient have irregular periods?  No  Did patient use hormonal birth control in the three months prior to positive urine pregnancy test? No  Is the patient breastfeeding?  No  Is the patient 10 weeks or greater at time of education visit?  No

## 2022-10-21 ENCOUNTER — VIRTUAL VISIT (OUTPATIENT)
Dept: MIDWIFE SERVICES | Facility: CLINIC | Age: 32
End: 2022-10-21
Payer: COMMERCIAL

## 2022-10-21 ENCOUNTER — ANCILLARY PROCEDURE (OUTPATIENT)
Dept: ULTRASOUND IMAGING | Facility: CLINIC | Age: 32
End: 2022-10-21
Attending: OBSTETRICS & GYNECOLOGY
Payer: COMMERCIAL

## 2022-10-21 DIAGNOSIS — Z34.91 VIABLE PREGNANCY IN FIRST TRIMESTER: Primary | ICD-10-CM

## 2022-10-21 DIAGNOSIS — Z34.90 ENCOUNTER FOR SUPERVISION OF NORMAL PREGNANCY: ICD-10-CM

## 2022-10-21 PROCEDURE — 99207 PR NO BILLABLE SERVICE THIS VISIT: CPT | Performed by: ADVANCED PRACTICE MIDWIFE

## 2022-10-21 PROCEDURE — 76801 OB US < 14 WKS SINGLE FETUS: CPT | Performed by: OBSTETRICS & GYNECOLOGY

## 2022-10-21 NOTE — PROGRESS NOTES
Lindsey is a 32 year old who is being evaluated via a billable telephone visit.      Assessment & Plan     (Z34.91) Viable pregnancy in first trimester  (primary encounter diagnosis)    Has [facility] appt for genetic screening and NOB appts set up already.    No follow-ups on file.    Slade Taylor CNM  [facility]    Subjective   Lindsey is a 32 year old, presenting for the following health issues:  ultrasound review      HPI     Lindsey is seen via telephone visit to review viability U/S from earlier today. She is feeling well, no complaints. Nausea of early pregnancy has passed. She is feeling confident about pregnancy, as this is her third, and she has done this before. Has some questions about genetic screening appt. No other concerns. Today's U/S shows viable IUP at 10w1d, which is consistent with LMP dating. AIME remains 5/22/23    Review of Systems   CONSTITUTIONAL: NEGATIVE for fever, chills, change in weight  GI: NEGATIVE for nausea, abdominal pain, heartburn, or change in bowel habits    Objective           Vitals:  No vitals were obtained today due to virtual visit.    Physical Exam   healthy, alert and no distress  PSYCH: Alert and oriented times 3; coherent speech, normal   rate and volume, able to articulate logical thoughts, able   to abstract reason, no tangential thoughts, no hallucinations   or delusions  Her affect is normal  RESP: No cough, no audible wheezing, able to talk in full sentences  Remainder of exam unable to be completed due to telephone visits    US OB < 14 Weeks Single    Result Date: 10/21/2022  Obstetrical Ultrasound Report OB U/S  < 14 Weeks -  Transabdominal  ealth [facility] Referring Provider: Inés Edward MD Sonographer: Christina Gonzalez RDMS Indication:  Viability check   Dating (mm/dd/yyyy): LMP: 08/15/22                 EDC:  05/22/23  GA by LMP:         9w4d   Current Scan On: 10/21/2022        EDC:  05/18/23   GA by Current Scan:        10w1d The calculation of the gestational age by current scan was based on CRL. Anatomy Scan: Dietrich gestation. Biometry: CRL                       3.2 cm                  10w1d                  Yolk Sac                              3.3 mm                                                Fetal heart activity:  Rate and rhythm is within normal limits.  Fetal heart rate: 168 bpm Findings: Viable IUP   Maternal Structures: Cervix: The cervix appears long and closed. Right Ovary: Simple cyst 3.4 x 2.0 x 2.4 cm Left Ovary: Wnl Impression: Early dietrich IUP with yolk sac and cardiac activity, measures 10w 1d by today's ultrasound, EDC remains 05/22/2023. MD Slade Pedroza CN          Phone call duration: 5 minutes

## 2022-10-22 ENCOUNTER — IMMUNIZATION (OUTPATIENT)
Dept: PEDIATRICS | Facility: CLINIC | Age: 32
End: 2022-10-22
Payer: COMMERCIAL

## 2022-10-22 PROCEDURE — 90471 IMMUNIZATION ADMIN: CPT

## 2022-10-22 PROCEDURE — 90686 IIV4 VACC NO PRSV 0.5 ML IM: CPT

## 2022-11-04 ENCOUNTER — PRE VISIT (OUTPATIENT)
Dept: MATERNAL FETAL MEDICINE | Facility: CLINIC | Age: 32
End: 2022-11-04

## 2022-11-09 ENCOUNTER — OFFICE VISIT (OUTPATIENT)
Dept: MATERNAL FETAL MEDICINE | Facility: CLINIC | Age: 32
End: 2022-11-09
Attending: OBSTETRICS & GYNECOLOGY
Payer: COMMERCIAL

## 2022-11-09 ENCOUNTER — HOSPITAL ENCOUNTER (OUTPATIENT)
Dept: ULTRASOUND IMAGING | Facility: CLINIC | Age: 32
Discharge: HOME | End: 2022-11-09
Attending: OBSTETRICS & GYNECOLOGY
Payer: COMMERCIAL

## 2022-11-09 DIAGNOSIS — O26.90 PREGNANCY RELATED CONDITION, ANTEPARTUM: ICD-10-CM

## 2022-11-09 DIAGNOSIS — O09.521 MULTIGRAVIDA OF ADVANCED MATERNAL AGE IN FIRST TRIMESTER: Primary | ICD-10-CM

## 2022-11-09 PROCEDURE — 76813 OB US NUCHAL MEAS 1 GEST: CPT

## 2022-11-09 PROCEDURE — 76813 OB US NUCHAL MEAS 1 GEST: CPT | Mod: 26 | Performed by: OBSTETRICS & GYNECOLOGY

## 2022-11-09 PROCEDURE — 96040 HC GENETIC COUNSELING, EACH 30 MINUTES: CPT | Performed by: GENETIC COUNSELOR, MS

## 2022-11-09 NOTE — PROGRESS NOTES
[facility]  Genetic Counseling Consult    Patient: Lindsey Giles YOB: 1990   Date of Service: 22      Lindsey Giles was seen with her , Joel, at [facility] for genetic consultation to discuss the options for screening and testing for fetal chromosome abnormalities.  The indication for genetic counseling is advanced maternal age.         Impression/Plan:   1.  Lindsey elected to proceed with NT ultrasound today, see corresponding report. They have declined screening and diagnostic testing at this time, however are aware these options remain available. They were provided with written information regarding NIPT and my direct contact information if any questions arise or if they wish to pursue any testing.      2.  Maternal serum AFP (single marker screen) is recommended after 15 weeks to screen for open neural tube defects.     3.  An 18-20 week comprehensive ultrasound is standard of care for all women 35 or older at delivery.    Pregnancy History:   /Parity:     Age at Delivery: 33 year old  AIME: 2023, by Last Menstrual Period  Gestational Age: 12w2d    No significant complications or exposures were reported in the current pregnancy.    Lindsey treadwell pregnancy history is significant for a history of high blood pressure and preeclampsia at the end of her first pregnancy at 40w. She was induced for that pregnancy and had not other complications. Lindsey's second pregnancy was delivered via caesarian section due to fetal positioning.    Medical History:   Lindsey treadwell reported medical history is not expected to impact pregnancy management or risks to fetal development.       Family History:   A three-generation pedigree was obtained, and is scanned under the  Media  tab.   The following significant findings were reported by Lindsey:    Lindsey's partner, Joel, is currently 33 and healthy.    Lindsey reports her mother and maternal uncle have both been diagnosed with multiple sclerosis. Multiple sclerosis (MS) is an autoimmune disease of the central nervous system, characterized by focal inflammation, demyelination, and axonal injury. MS is thought to be multifactorial, meaning a combination of environmental factors and variations in dozens of genes are involved in the development of MS. Given that there is a genetic component, the risk of developing MS is higher for siblings or children of a person with the condition than for the general population. This information should be shared with her primary care provider.     Lindsey also reported that she has a maternal aunt and maternal cousin, both diagnosed with autism spectrum disorder (ASD). Autism is a spectrum of developmental disorders characterized by impaired social interaction, problems with verbal and nonverbal communication, and unusual, repetitive, or severely limited activities and interests.  Autism is a heterogeneous disorder, including genetic and non-genetic causes. In a small percentage of individuals with ASD, it is a feature of a certain genetic condition such as Down syndrome, fragile X syndrome, or Rett syndrome. However, in most isolated cases the cause may be multifactorial, meaning a combination of genetic and environmental factors.    Lindsey reports that she has a maternal aunt diagnosed with thyroid cancer. We discussed how most cancer seen in families occurs sporadically, but about 5-10% may be due to an underlying genetic etiology. Lindsey was encouraged to share this family history information with her primary care provider to ensure appropriate screening.     Otherwise, the reported family history is negative for multiple miscarriages, stillbirths, birth defects, intellectual disability, known genetic conditions, and consanguinity.       Carrier Screening:     Expanded carrier screening for mutations in a large panel of genes  associated with autosomal recessive conditions including cystic fibrosis, spinal muscular atrophy, and others, is now available.      The patient has declined the carrier screening options reviewed today.       Risk Assessment for Chromosome Conditions:   We explained that the risk for fetal chromosome abnormalities increases with maternal age. We discussed specific features of common chromosome abnormalities, including Down syndrome, trisomy 13, trisomy 18, and sex chromosome trisomies.      - At age 35 at midtrimester, the risk to have a baby with Down syndrome is 1 in 274.     - At age 33 at midtrimester, the risk to have a baby with any chromosome abnormality is 1 in 135.        Testing Options:   We discussed the following options:   Non-invasive Prenatal Testing (NIPT)    Maternal plasma cell-free DNA testing; first trimester ultrasound with nuchal translucency and nasal bone assessment is recommended, when appropriate    Screens for fetal trisomy 21, trisomy 13, trisomy 18, and sex chromosome aneuploidy    Cannot screen for open neural tube defects; maternal serum AFP after 15 weeks is recommended     Chorionic villus sampling (CVS)    Invasive procedure typically performed in the first trimester by which placental villi are obtained for the purpose of chromosome analysis and/or other prenatal genetic analysis    Diagnostic results; >99% sensitivity for fetal chromosome abnormalities    Cannot test for open neural tube defects; maternal serum AFP after 15 weeks is recommended     Genetic Amniocentesis    Invasive procedure typically performed in the second trimester by which amniotic fluid is obtained for the purpose of chromosome analysis and/or other prenatal genetic analysis    Diagnostic results; >99% sensitivity for fetal chromosome abnormalities    AFAFP measurement tests for open neural tube defects     Comprehensive (Level II) ultrasound: Detailed ultrasound performed between 18-22 weeks gestation to  screen for major birth defects and markers for aneuploidy.      We reviewed the benefits and limitations of this testing.  Screening tests provide a risk assessment specific to the pregnancy for certain fetal chromosome abnormalities, but cannot definitively diagnose or exclude a fetal chromosome abnormality.  Follow-up genetic counseling and consideration of diagnostic testing is recommended with any abnormal screening result.     Diagnostic tests carry inherent risks- including risk of miscarriage- that require careful consideration.  These tests can detect fetal chromosome abnormalities with greater than 99% certainty.  Results can be compromised by maternal cell contamination or mosaicism, and are limited by the resolution of cytogenetic G-banding technology.  There is no screening nor diagnostic test that can detect all forms of birth defects or mental disability.     It was a pleasure to be involved with Lindsey [facility]. Face-to-face time of the meeting was 35 minutes.    Teresa Huerta GC Student    Patient seen, evaluated and discussed with the Genetic Counseling Student. I have verified the content of the note, which accurately reflects my assessment of the patient and the plan of care.  Supervising Genetic Counselor    Myrna Mcclellan MS, [facility]  Licensed Genetic Counselor  [facility]  Maternal Fetal Medicine  Ph: [phone]  [email or web address]

## 2022-11-09 NOTE — PROGRESS NOTES
Please see the imaging tab for details of the ultrasound performed today.    Nury Montalvo MD  Specialist in Maternal-Fetal Medicine

## 2022-11-10 LAB
ABO/RH(D): NORMAL
ANTIBODY SCREEN: NEGATIVE
SPECIMEN EXPIRATION DATE: NORMAL

## 2022-11-11 ENCOUNTER — PRENATAL OFFICE VISIT (OUTPATIENT)
Dept: OBGYN | Facility: CLINIC | Age: 32
End: 2022-11-11
Payer: COMMERCIAL

## 2022-11-11 VITALS
HEART RATE: 68 BPM | HEIGHT: 66 IN | SYSTOLIC BLOOD PRESSURE: 122 MMHG | DIASTOLIC BLOOD PRESSURE: 82 MMHG | BODY MASS INDEX: 23.78 KG/M2 | WEIGHT: 148 LBS | OXYGEN SATURATION: 100 %

## 2022-11-11 DIAGNOSIS — Z34.81 ENCOUNTER FOR SUPERVISION OF OTHER NORMAL PREGNANCY IN FIRST TRIMESTER: Primary | ICD-10-CM

## 2022-11-11 LAB
ALBUMIN UR-MCNC: NEGATIVE MG/DL
APPEARANCE UR: CLEAR
BILIRUB UR QL STRIP: NEGATIVE
COLOR UR AUTO: YELLOW
CREAT UR-MCNC: 58 MG/DL
ERYTHROCYTE [DISTWIDTH] IN BLOOD BY AUTOMATED COUNT: 12.4 % (ref 10–15)
GLUCOSE UR STRIP-MCNC: NEGATIVE MG/DL
HCT VFR BLD AUTO: 35.2 % (ref 35–47)
HGB BLD-MCNC: 12 G/DL (ref 11.7–15.7)
HGB UR QL STRIP: NEGATIVE
KETONES UR STRIP-MCNC: NEGATIVE MG/DL
LEUKOCYTE ESTERASE UR QL STRIP: NEGATIVE
MCH RBC QN AUTO: 30.2 PG (ref 26.5–33)
MCHC RBC AUTO-ENTMCNC: 34.1 G/DL (ref 31.5–36.5)
MCV RBC AUTO: 89 FL (ref 78–100)
NITRATE UR QL: NEGATIVE
PH UR STRIP: 6.5 [PH] (ref 5–7)
PLATELET # BLD AUTO: 196 10E3/UL (ref 150–450)
PROT UR-MCNC: 0.13 G/L
PROT/CREAT 24H UR: 0.22 G/G CR (ref 0–0.2)
RBC # BLD AUTO: 3.97 10E6/UL (ref 3.8–5.2)
SP GR UR STRIP: 1.02 (ref 1–1.03)
UROBILINOGEN UR STRIP-ACNC: 0.2 E.U./DL
WBC # BLD AUTO: 9.1 10E3/UL (ref 4–11)

## 2022-11-11 PROCEDURE — 86901 BLOOD TYPING SEROLOGIC RH(D): CPT | Performed by: OBSTETRICS & GYNECOLOGY

## 2022-11-11 PROCEDURE — 85027 COMPLETE CBC AUTOMATED: CPT | Performed by: OBSTETRICS & GYNECOLOGY

## 2022-11-11 PROCEDURE — 86900 BLOOD TYPING SEROLOGIC ABO: CPT | Performed by: OBSTETRICS & GYNECOLOGY

## 2022-11-11 PROCEDURE — 84460 ALANINE AMINO (ALT) (SGPT): CPT | Performed by: OBSTETRICS & GYNECOLOGY

## 2022-11-11 PROCEDURE — 86762 RUBELLA ANTIBODY: CPT | Performed by: OBSTETRICS & GYNECOLOGY

## 2022-11-11 PROCEDURE — 86850 RBC ANTIBODY SCREEN: CPT | Performed by: OBSTETRICS & GYNECOLOGY

## 2022-11-11 PROCEDURE — 87086 URINE CULTURE/COLONY COUNT: CPT | Performed by: OBSTETRICS & GYNECOLOGY

## 2022-11-11 PROCEDURE — 86780 TREPONEMA PALLIDUM: CPT | Performed by: OBSTETRICS & GYNECOLOGY

## 2022-11-11 PROCEDURE — 81003 URINALYSIS AUTO W/O SCOPE: CPT | Performed by: OBSTETRICS & GYNECOLOGY

## 2022-11-11 PROCEDURE — 87340 HEPATITIS B SURFACE AG IA: CPT | Performed by: OBSTETRICS & GYNECOLOGY

## 2022-11-11 PROCEDURE — 84450 TRANSFERASE (AST) (SGOT): CPT | Performed by: OBSTETRICS & GYNECOLOGY

## 2022-11-11 PROCEDURE — 86803 HEPATITIS C AB TEST: CPT | Performed by: OBSTETRICS & GYNECOLOGY

## 2022-11-11 PROCEDURE — 87389 HIV-1 AG W/HIV-1&-2 AB AG IA: CPT | Performed by: OBSTETRICS & GYNECOLOGY

## 2022-11-11 PROCEDURE — 99213 OFFICE O/P EST LOW 20 MIN: CPT | Performed by: OBSTETRICS & GYNECOLOGY

## 2022-11-11 PROCEDURE — 84156 ASSAY OF PROTEIN URINE: CPT | Performed by: OBSTETRICS & GYNECOLOGY

## 2022-11-11 PROCEDURE — 36415 COLL VENOUS BLD VENIPUNCTURE: CPT | Performed by: OBSTETRICS & GYNECOLOGY

## 2022-11-11 PROCEDURE — 82565 ASSAY OF CREATININE: CPT | Performed by: OBSTETRICS & GYNECOLOGY

## 2022-11-11 ASSESSMENT — PATIENT HEALTH QUESTIONNAIRE - PHQ9: SUM OF ALL RESPONSES TO PHQ QUESTIONS 1-9: 4

## 2022-11-11 NOTE — PROGRESS NOTES
OB - New OB History and Physical    HPI: Lindsey Giles is a 32 year old  at 12w4d as dated by LMP consistent with 10 week US .   Estimated Date of Delivery: May 22, 2023    Since becoming pregnant, patient reports she's been feeling ok, some fatigue and nausea but coping ok and starting to feel better. Denies vaginal bleeding or pelvic pain.       Obstetric history:     OB History    Para Term  AB Living   3 2 2 0 0 2   SAB IAB Ectopic Multiple Live Births   0 0 0 0 2      # Outcome Date GA Lbr Maycol/2nd Weight Sex Delivery Anes PTL Lv   3 Current            2 Term 05/15/19 39w0d  3.5 kg (7 lb 11.5 oz) M CS-LTranv   BELLA      Name: Michi      Apgar1: 8  Apgar5: 9   1 Term 16 40w1d / 00:23 3.771 kg (8 lb 5 oz) M Vag-Spont EPI N BELLA      Birth Comments: IOL for GHTN.  Pitocin      Complications: Preeclampsia/Hypertension      Name: Jerrell      Obstetric Comments   No PPH, no GDM. +Gestational Hypertension with IOL - no Magnesium Sulfate     Patient denies gestational diabetes,  labor.    Gynecologic History:   Patient's last menstrual period was 08/15/2022.   She denies hx of STIs or abnormal pap smears, last pap was NIL with negative HPV in 2018     Allergy: Patient has no known allergies.      Current Medications:  Current Outpatient Medications   Medication     aspirin (ASA) 81 MG EC tablet     No current facility-administered medications for this visit.       Past Medical History:  Past Medical History:   Diagnosis Date     History of depression     Situational in high school - Lexapro. No SI or hospitalizations     History of gestational hypertension 2017     Varicella        Past Surgical History:  Past Surgical History:   Procedure Laterality Date      SECTION N/A 05/15/2019    Procedure: Primary  Section;  Surgeon: Liz Hicks MD;  Location:  L+D       Social History:  Works as a therapist.   Denies current tobacco, alcohol or recreational drug use.  "  DV screening not done today as SO accompanied patient to appointment     Family History:  Family History   Problem Relation Age of Onset     Musculoskeletal Disorder Mother      Multiple Sclerosis Mother 42        doing well,     Hypertension Mother      Fibroids Mother         hysterectomy     Thyroid Disease Mother         unsure of what- takes medication     Hyperlipidemia Mother      Diabetes Maternal Grandmother      Autism Spectrum Disorder Cousin      Autism Spectrum Disorder Other      Birth defects No family hx of      Breast Cancer No family hx of      Ovarian Cancer No family hx of      Clotting Disorder No family hx of        Review of Systems  See HPI     Physical Exam:  Vitals:    22 1518   BP: 122/82   Pulse: 68   SpO2: 100%   Weight: 67.1 kg (148 lb)   Height: 1.676 m (5' 6\")     Body mass index is 23.89 kg/m .    General: Patient alert and oriented, no acute distress  CV: no peripheral edema or cyanosis  Resp: normal respiratory effort and equal lung expansion  Abdomen: non-tender to light and deep palpation, no masses, organomegaly or hernia  : deferred   Ext: non-tender, no edema    Assessment:  Lindsey Giles is a 32 year old  at 12w4d presenting to establish prenatal care.    Problem List:   AMA   Hx of gHTN   Hx of prior LTCS    Plan:  1. Prenatal labs drawn   2. Baseline pre-e labs ordered and 81 mg ASA recommended through delivery   3. Reviewed routine prenatal care. Discussed MD call schedule as well as role of residents and med students both in clinic and hospital.  She is okay with resident care  4. Briefly reviewed risks/benefits of repeat CS vs TOLAC but will further discuss as pregnancy progresses   5. Pap: UTD   6. Diet, Nutrition and Exercise:  Continue PNVs. Continue normal exercise. Her prepregnancy BMI is 22.6.  According to the WHO guidelines, patient is given a goal of gaining approximately 25-35 pounds during the course of her pregnancy.    7. Immunizations: " plan TdaP at 28 weeks, COVID booster and flu vaccine UTD   8. Fetal anomaly screening: s/p genetic counseling appointment. Normal NT but patient declines NIPT or FTS.   9. Routine Prenatal Care: the patient will return to clinic in 4 weeks and lily Elliott MD

## 2022-11-12 LAB
HBV SURFACE AG SERPL QL IA: NONREACTIVE
HCV AB SERPL QL IA: NONREACTIVE
HIV 1+2 AB+HIV1 P24 AG SERPL QL IA: NONREACTIVE
T PALLIDUM AB SER QL: NONREACTIVE

## 2022-11-13 LAB
ALT SERPL W P-5'-P-CCNC: 9 U/L (ref 10–35)
AST SERPL W P-5'-P-CCNC: 13 U/L (ref 10–35)
BACTERIA UR CULT: NORMAL
CREAT SERPL-MCNC: 0.5 MG/DL (ref 0.51–0.95)
GFR SERPL CREATININE-BSD FRML MDRD: >90 ML/MIN/1.73M2

## 2022-11-14 LAB
RUBV IGG SERPL QL IA: 5.92 INDEX
RUBV IGG SERPL QL IA: POSITIVE

## 2022-12-04 ENCOUNTER — NURSE TRIAGE (OUTPATIENT)
Dept: NURSING | Facility: CLINIC | Age: 32
End: 2022-12-04
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
## 2022-12-20 ENCOUNTER — PRENATAL OFFICE VISIT (OUTPATIENT)
Dept: OBGYN | Facility: CLINIC | Age: 32
End: 2022-12-20
Payer: COMMERCIAL

## 2022-12-20 VITALS
DIASTOLIC BLOOD PRESSURE: 58 MMHG | HEART RATE: 81 BPM | OXYGEN SATURATION: 100 % | BODY MASS INDEX: 24.58 KG/M2 | WEIGHT: 152.3 LBS | SYSTOLIC BLOOD PRESSURE: 110 MMHG

## 2022-12-20 DIAGNOSIS — O09.522 MULTIGRAVIDA OF ADVANCED MATERNAL AGE IN SECOND TRIMESTER: Primary | ICD-10-CM

## 2022-12-20 PROCEDURE — 99212 OFFICE O/P EST SF 10 MIN: CPT | Performed by: OBSTETRICS & GYNECOLOGY

## 2022-12-20 RX ORDER — PRENATAL VIT/IRON FUM/FOLIC AC 27MG-0.8MG
1 TABLET ORAL DAILY
COMMUNITY

## 2022-12-21 NOTE — PROGRESS NOTES
Doing well.  Feeling fetal movement.   Discussed urine protein.   Elevated at end of last pregnancy, negative postpartum.   Slightly elevated baseline urine protein. Discussed only relevant if she develops high blood pressures. Will then consult MFM if there is rise in baseline, and then only if it would . Would recommend postpartum assessment.   Discussed delivery plans. H/o  and LTCS for breech. Would be open to TOLAC if conditions favorable at end of pregnancy, will re-evaluate then.   Fetal survey next week.   RTC 4 weeks.

## 2022-12-23 ENCOUNTER — HOSPITAL ENCOUNTER (OUTPATIENT)
Dept: ULTRASOUND IMAGING | Facility: CLINIC | Age: 32
Discharge: HOME | End: 2022-12-23
Attending: OBSTETRICS & GYNECOLOGY
Payer: COMMERCIAL

## 2022-12-23 ENCOUNTER — OFFICE VISIT (OUTPATIENT)
Dept: MATERNAL FETAL MEDICINE | Facility: CLINIC | Age: 32
End: 2022-12-23
Attending: OBSTETRICS & GYNECOLOGY
Payer: COMMERCIAL

## 2022-12-23 DIAGNOSIS — O09.521 MULTIGRAVIDA OF ADVANCED MATERNAL AGE IN FIRST TRIMESTER: ICD-10-CM

## 2022-12-23 DIAGNOSIS — O44.00 PLACENTA PREVIA ANTEPARTUM: Primary | ICD-10-CM

## 2022-12-23 DIAGNOSIS — O09.522 ANTEPARTUM MULTIGRAVIDA OF ADVANCED MATERNAL AGE, SECOND TRIMESTER: ICD-10-CM

## 2022-12-23 PROCEDURE — 76811 OB US DETAILED SNGL FETUS: CPT | Mod: 26 | Performed by: OBSTETRICS & GYNECOLOGY

## 2022-12-23 PROCEDURE — 76811 OB US DETAILED SNGL FETUS: CPT

## 2023-01-20 ENCOUNTER — PRENATAL OFFICE VISIT (OUTPATIENT)
Dept: OBGYN | Facility: CLINIC | Age: 33
End: 2023-01-20
Payer: COMMERCIAL

## 2023-01-20 VITALS
TEMPERATURE: 98 F | WEIGHT: 160 LBS | BODY MASS INDEX: 25.82 KG/M2 | DIASTOLIC BLOOD PRESSURE: 63 MMHG | SYSTOLIC BLOOD PRESSURE: 116 MMHG | HEART RATE: 75 BPM

## 2023-01-20 DIAGNOSIS — O44.02 PLACENTA PREVIA ANTEPARTUM IN SECOND TRIMESTER: ICD-10-CM

## 2023-01-20 DIAGNOSIS — O09.522 MULTIGRAVIDA OF ADVANCED MATERNAL AGE IN SECOND TRIMESTER: Primary | ICD-10-CM

## 2023-01-20 PROCEDURE — 99207 PR PRENATAL VISIT: CPT | Performed by: OBSTETRICS & GYNECOLOGY

## 2023-01-20 ASSESSMENT — PATIENT HEALTH QUESTIONNAIRE - PHQ9
SUM OF ALL RESPONSES TO PHQ QUESTIONS 1-9: 3
5. POOR APPETITE OR OVEREATING: SEVERAL DAYS

## 2023-01-20 ASSESSMENT — ANXIETY QUESTIONNAIRES
7. FEELING AFRAID AS IF SOMETHING AWFUL MIGHT HAPPEN: NOT AT ALL
3. WORRYING TOO MUCH ABOUT DIFFERENT THINGS: SEVERAL DAYS
1. FEELING NERVOUS, ANXIOUS, OR ON EDGE: NOT AT ALL
GAD7 TOTAL SCORE: 3
6. BECOMING EASILY ANNOYED OR IRRITABLE: SEVERAL DAYS
5. BEING SO RESTLESS THAT IT IS HARD TO SIT STILL: NOT AT ALL
GAD7 TOTAL SCORE: 3
2. NOT BEING ABLE TO STOP OR CONTROL WORRYING: NOT AT ALL

## 2023-01-20 NOTE — PROGRESS NOTES
Doing well.   Good fetal movement.   Discussed placenta previa, precautions.   GCT next visit   RTC 4 weeks.

## 2023-02-20 ENCOUNTER — PRENATAL OFFICE VISIT (OUTPATIENT)
Dept: OBGYN | Facility: CLINIC | Age: 33
End: 2023-02-20
Payer: COMMERCIAL

## 2023-02-20 VITALS
BODY MASS INDEX: 26.81 KG/M2 | TEMPERATURE: 97.7 F | SYSTOLIC BLOOD PRESSURE: 109 MMHG | DIASTOLIC BLOOD PRESSURE: 72 MMHG | WEIGHT: 166.8 LBS | HEART RATE: 68 BPM | HEIGHT: 66 IN

## 2023-02-20 DIAGNOSIS — O99.019 ANEMIA IN PREGNANCY: Primary | ICD-10-CM

## 2023-02-20 DIAGNOSIS — O99.012 ANEMIA DURING PREGNANCY IN SECOND TRIMESTER: ICD-10-CM

## 2023-02-20 DIAGNOSIS — O09.522 MULTIGRAVIDA OF ADVANCED MATERNAL AGE IN SECOND TRIMESTER: Primary | ICD-10-CM

## 2023-02-20 LAB
ERYTHROCYTE [DISTWIDTH] IN BLOOD BY AUTOMATED COUNT: 13.3 % (ref 10–15)
GLUCOSE 1H P 50 G GLC PO SERPL-MCNC: 135 MG/DL (ref 70–129)
HCT VFR BLD AUTO: 32 % (ref 35–47)
HGB BLD-MCNC: 10.7 G/DL (ref 11.7–15.7)
HGB BLD-MCNC: 10.9 G/DL (ref 11.7–15.7)
HOLD SPECIMEN: NORMAL
MCH RBC QN AUTO: 31.6 PG (ref 26.5–33)
MCHC RBC AUTO-ENTMCNC: 34.1 G/DL (ref 31.5–36.5)
MCV RBC AUTO: 93 FL (ref 78–100)
PLATELET # BLD AUTO: 172 10E3/UL (ref 150–450)
RBC # BLD AUTO: 3.45 10E6/UL (ref 3.8–5.2)
WBC # BLD AUTO: 9.1 10E3/UL (ref 4–11)

## 2023-02-20 PROCEDURE — 82950 GLUCOSE TEST: CPT | Performed by: OBSTETRICS & GYNECOLOGY

## 2023-02-20 PROCEDURE — 85027 COMPLETE CBC AUTOMATED: CPT | Performed by: OBSTETRICS & GYNECOLOGY

## 2023-02-20 PROCEDURE — 83540 ASSAY OF IRON: CPT | Performed by: OBSTETRICS & GYNECOLOGY

## 2023-02-20 PROCEDURE — 99207 PR PRENATAL VISIT: CPT | Performed by: OBSTETRICS & GYNECOLOGY

## 2023-02-20 PROCEDURE — 36415 COLL VENOUS BLD VENIPUNCTURE: CPT | Performed by: OBSTETRICS & GYNECOLOGY

## 2023-02-20 PROCEDURE — 90715 TDAP VACCINE 7 YRS/> IM: CPT | Performed by: OBSTETRICS & GYNECOLOGY

## 2023-02-20 PROCEDURE — 90471 IMMUNIZATION ADMIN: CPT | Performed by: OBSTETRICS & GYNECOLOGY

## 2023-02-20 PROCEDURE — 82728 ASSAY OF FERRITIN: CPT | Performed by: OBSTETRICS & GYNECOLOGY

## 2023-02-20 PROCEDURE — 83550 IRON BINDING TEST: CPT | Performed by: OBSTETRICS & GYNECOLOGY

## 2023-02-20 NOTE — PROGRESS NOTES
"Return OB visit    Subjective:  Patient reports active fetal movement, no vaginal bleeding or leaking fluid. She denies contractions. She has no concerns today.       Objective:  /72   Pulse 68   Temp 97.7  F (36.5  C) (Temporal)   Ht 1.676 m (5' 6\")   Wt 75.7 kg (166 lb 12.8 oz)   LMP 08/15/2022   Breastfeeding No   BMI 26.92 kg/m     See OB flow sheet    Assessment and Plan    Lindsey Giels is a 32 year old  at 27w0d here for YOLANDA visit, pregnancy complicated by placenta previa and history of prior CS     This visit:  -GTT and OB hemoglobin done today   -Tdap given     Next visit:  -F/U on repeat US (3/3)     RTC in 2 weeks or sooner PRELIZABETH Elliott MD      "

## 2023-02-21 LAB
FERRITIN SERPL-MCNC: 16 NG/ML (ref 6–175)
IRON BINDING CAPACITY (ROCHE): 372 UG/DL (ref 240–430)
IRON SATN MFR SERPL: 18 % (ref 15–46)
IRON SERPL-MCNC: 66 UG/DL (ref 37–145)

## 2023-02-22 ENCOUNTER — TELEPHONE (OUTPATIENT)
Dept: OBGYN | Facility: CLINIC | Age: 33
End: 2023-02-22
Payer: COMMERCIAL

## 2023-02-22 DIAGNOSIS — R73.09 ELEVATED GLUCOSE: Primary | ICD-10-CM

## 2023-02-24 RX ORDER — FERROUS SULFATE 325(65) MG
325 TABLET ORAL
Qty: 90 TABLET | Refills: 0 | Status: SHIPPED | OUTPATIENT
Start: 2023-02-24 | End: 2023-05-25

## 2023-02-24 RX ORDER — MULTIVIT WITH MINERALS/LUTEIN
250 TABLET ORAL DAILY
Qty: 90 TABLET | Refills: 0 | Status: SHIPPED | OUTPATIENT
Start: 2023-02-24 | End: 2023-05-25

## 2023-02-24 RX ORDER — LANOLIN ALCOHOL/MO/W.PET/CERES
1000 CREAM (GRAM) TOPICAL DAILY
Qty: 90 TABLET | Refills: 0 | Status: SHIPPED | OUTPATIENT
Start: 2023-02-24 | End: 2023-05-25

## 2023-02-27 ENCOUNTER — LAB (OUTPATIENT)
Dept: LAB | Facility: CLINIC | Age: 33
End: 2023-02-27
Payer: COMMERCIAL

## 2023-02-27 DIAGNOSIS — R73.09 ELEVATED GLUCOSE: ICD-10-CM

## 2023-02-27 LAB
GESTATIONAL GTT 1 HR POST DOSE: 151 MG/DL (ref 60–179)
GESTATIONAL GTT 2 HR POST DOSE: 107 MG/DL (ref 60–154)
GESTATIONAL GTT 3 HR POST DOSE: 143 MG/DL (ref 60–139)
GLUCOSE P FAST SERPL-MCNC: 90 MG/DL (ref 60–94)

## 2023-02-27 PROCEDURE — 82952 GTT-ADDED SAMPLES: CPT

## 2023-02-27 PROCEDURE — 82951 GLUCOSE TOLERANCE TEST (GTT): CPT

## 2023-02-27 PROCEDURE — 36415 COLL VENOUS BLD VENIPUNCTURE: CPT

## 2023-03-03 ENCOUNTER — OFFICE VISIT (OUTPATIENT)
Dept: MATERNAL FETAL MEDICINE | Facility: CLINIC | Age: 33
End: 2023-03-03
Attending: OBSTETRICS & GYNECOLOGY
Payer: COMMERCIAL

## 2023-03-03 ENCOUNTER — HOSPITAL ENCOUNTER (OUTPATIENT)
Dept: ULTRASOUND IMAGING | Facility: CLINIC | Age: 33
Discharge: HOME | End: 2023-03-03
Attending: OBSTETRICS & GYNECOLOGY
Payer: COMMERCIAL

## 2023-03-03 DIAGNOSIS — O44.00 PLACENTA PREVIA ANTEPARTUM: Primary | ICD-10-CM

## 2023-03-03 DIAGNOSIS — O44.00 PLACENTA PREVIA ANTEPARTUM: ICD-10-CM

## 2023-03-03 PROCEDURE — 76816 OB US FOLLOW-UP PER FETUS: CPT

## 2023-03-03 PROCEDURE — 76816 OB US FOLLOW-UP PER FETUS: CPT | Mod: 26 | Performed by: OBSTETRICS & GYNECOLOGY

## 2023-03-03 NOTE — PROGRESS NOTES
"Please see \"Imaging\" tab under \"Chart Review\" for details of today's visit.    Dee Meredith    "

## 2023-03-13 ENCOUNTER — PRENATAL OFFICE VISIT (OUTPATIENT)
Dept: OBGYN | Facility: CLINIC | Age: 33
End: 2023-03-13
Payer: COMMERCIAL

## 2023-03-13 VITALS
SYSTOLIC BLOOD PRESSURE: 106 MMHG | DIASTOLIC BLOOD PRESSURE: 66 MMHG | HEART RATE: 72 BPM | OXYGEN SATURATION: 100 % | BODY MASS INDEX: 26.68 KG/M2 | HEIGHT: 66 IN | WEIGHT: 166 LBS

## 2023-03-13 DIAGNOSIS — Z34.83 ENCOUNTER FOR SUPERVISION OF OTHER NORMAL PREGNANCY IN THIRD TRIMESTER: Primary | ICD-10-CM

## 2023-03-13 DIAGNOSIS — O44.02 PLACENTA PREVIA ANTEPARTUM IN SECOND TRIMESTER: ICD-10-CM

## 2023-03-13 PROCEDURE — 99207 PR PRENATAL VISIT: CPT | Performed by: OBSTETRICS & GYNECOLOGY

## 2023-03-13 NOTE — PROGRESS NOTES
Doing well.   Good fetal movement.   Has placenta previa, no bleeding. Ultrasound next visit.   Taking iron supplements, will recheck next visit.   RTC 2 weeks.

## 2023-03-22 ENCOUNTER — OFFICE VISIT (OUTPATIENT)
Dept: URGENT CARE | Facility: URGENT CARE | Age: 33
End: 2023-03-22
Payer: COMMERCIAL

## 2023-03-22 ENCOUNTER — MYC MEDICAL ADVICE (OUTPATIENT)
Dept: OBGYN | Facility: CLINIC | Age: 33
End: 2023-03-22

## 2023-03-22 ENCOUNTER — NURSE TRIAGE (OUTPATIENT)
Dept: NURSING | Facility: CLINIC | Age: 33
End: 2023-03-22

## 2023-03-22 VITALS
DIASTOLIC BLOOD PRESSURE: 70 MMHG | RESPIRATION RATE: 14 BRPM | TEMPERATURE: 98.5 F | SYSTOLIC BLOOD PRESSURE: 106 MMHG | OXYGEN SATURATION: 98 % | HEART RATE: 74 BPM

## 2023-03-22 DIAGNOSIS — R07.0 THROAT PAIN: ICD-10-CM

## 2023-03-22 DIAGNOSIS — Z3A.31 31 WEEKS GESTATION OF PREGNANCY: ICD-10-CM

## 2023-03-22 DIAGNOSIS — R09.89 SCATTERED RHONCHI OF RIGHT LUNG: Primary | ICD-10-CM

## 2023-03-22 DIAGNOSIS — J22 LRTI (LOWER RESPIRATORY TRACT INFECTION): ICD-10-CM

## 2023-03-22 LAB
DEPRECATED S PYO AG THROAT QL EIA: NEGATIVE
GROUP A STREP BY PCR: NOT DETECTED

## 2023-03-22 PROCEDURE — 87651 STREP A DNA AMP PROBE: CPT | Performed by: INTERNAL MEDICINE

## 2023-03-22 PROCEDURE — 99204 OFFICE O/P NEW MOD 45 MIN: CPT | Performed by: INTERNAL MEDICINE

## 2023-03-22 RX ORDER — ALBUTEROL SULFATE 90 UG/1
2 AEROSOL, METERED RESPIRATORY (INHALATION) EVERY 6 HOURS PRN
Qty: 18 G | Refills: 0 | Status: SHIPPED | OUTPATIENT
Start: 2023-03-22 | End: 2023-05-23

## 2023-03-22 RX ORDER — AZITHROMYCIN 250 MG/1
TABLET, FILM COATED ORAL
Qty: 6 TABLET | Refills: 0 | Status: SHIPPED | OUTPATIENT
Start: 2023-03-22 | End: 2023-03-27

## 2023-03-22 ASSESSMENT — ENCOUNTER SYMPTOMS
FEVER: 0
HEADACHES: 1
RHINORRHEA: 1
CHILLS: 0
DIAPHORESIS: 0
DIARRHEA: 0
MYALGIAS: 0

## 2023-03-22 NOTE — PATIENT INSTRUCTIONS
Noise noted in R lung  Xray avoided due to pregnancy    treatment zpak antibiotics   Albuterol inhaler every 4-6 hours as needed  Medication safety reviewed for pregnancy in up-to-date.    Review care plan with your OB

## 2023-03-22 NOTE — CONFIDENTIAL NOTE
31w2d    Pt went to  and has PNA  Okay for albuteral and azithro in pregnancy  Pt wanted to verify   Routing to provider to advise.  Vandana Yang RN on 3/22/2023 at 1:29 PM

## 2023-03-22 NOTE — PROGRESS NOTES
ASSESSMENT AND PLAN:      ICD-10-CM    1. Scattered rhonchi of right lung  R09.89 azithromycin (ZITHROMAX) 250 MG tablet     albuterol (PROAIR HFA/PROVENTIL HFA/VENTOLIN HFA) 108 (90 Base) MCG/ACT inhaler      2. Throat pain  R07.0 Streptococcus A Rapid Screen w/Reflex to PCR - Clinic Collect     Group A Streptococcus PCR Throat Swab      3. 31 weeks gestation of pregnancy  Z3A.31       4. LRTI (lower respiratory tract infection)  J22         xray not performed due to pregnancy    PLAN:      Patient Instructions         Noise noted in R lung  Xray avoided due to pregnancy    treatment zpak antibiotics   Albuterol inhaler every 4-6 hours as needed  Medication safety reviewed for pregnancy in up-to-date.    Review care plan with your OB        Return in about 2 weeks (around 4/5/2023) for lung exam.        Suzanna Hinkle MD  [facility] U[facility]    Subjective     Lindsey Giles is a 32 year old who presents for Patient presents with:  Urgent Care  Throat Problem: Started last night, inflamed throat, white spots in the back of throat, no fever, chest congestion x2days, 31 wks pregnant     a new patient of [facility].    URI Adult    Onset of symptoms was 2 day(s) ago.    Current and Associated symptoms: sore throat and chest congestion  Treatment measures tried include Tylenol, muscinex  Predisposing factors include ill contact: 1 year old with croup & antibiotics for ear infection.        Review of Systems   Constitutional: Negative for chills, diaphoresis and fever.   HENT: Positive for congestion and rhinorrhea.    Gastrointestinal: Negative for diarrhea.   Musculoskeletal: Negative for myalgias.   Neurological: Positive for headaches (last night).           Objective    /70   Pulse 74   Temp 98.5  F (36.9  C) (Oral)   Resp 14   LMP 08/15/2022   SpO2 98%   Physical Exam  Vitals reviewed.   Constitutional:       Appearance: Normal appearance.   HENT:      Right Ear: Tympanic membrane  normal.      Left Ear: Tympanic membrane normal.      Nose: Nose normal.      Mouth/Throat:      Mouth: Mucous membranes are moist.      Pharynx: Oropharynx is clear. No posterior oropharyngeal erythema.   Cardiovascular:      Rate and Rhythm: Normal rate and regular rhythm.      Pulses: Normal pulses.      Heart sounds: Normal heart sounds.   Pulmonary:      Breath sounds: Wheezing (right side) and rhonchi (right) present.   Neurological:      Mental Status: She is alert.            Results for orders placed or performed in visit on 03/22/23 (from the past 24 hour(s))   Streptococcus A Rapid Screen w/Reflex to PCR - Clinic Collect    Specimen: Throat; Swab   Result Value Ref Range    Group A Strep antigen Negative Negative

## 2023-03-23 NOTE — TELEPHONE ENCOUNTER
Patient calling reports being seen earlier today in urgent care for early pneumonia. Reports being prescribed Albuterol and Azithromycin with advice to check with her OB prior to taking these medications. Patient requesting on call provider input regarding if these medications are ok.   Dr. Adelina Bain paged and advised both medications are ok for patient to take. Voicemail left for patient with this information.     Jennifer Gilliam RN 03/22/23 9:26 PM   [facility] Triage Nurse Advisor          Reason for Disposition    [1] Caller has pregnancy question AND [2] triage nurse able to answer question    Additional Information    Negative: ALSO, Normal pregnancy -  how to stay healthy    Negative: [1] Lower back pain - Mild AND [2] 3rd trimester    Negative: [1] Heartburn - Mild AND [2] 2nd or 3rd trimester    Negative: [1] Vaginal bleeding (spotting) - Mild AND [2] One Time    Negative: [1] Morning sickness AND [2] 1st Trimester    Negative: [1] Swollen feet (edema) - Mild AND [2] 3rd trimester    Protocols used: PREGNANCY [id number]

## 2023-03-31 ENCOUNTER — HOSPITAL ENCOUNTER (OUTPATIENT)
Dept: ULTRASOUND IMAGING | Facility: CLINIC | Age: 33
Discharge: HOME | End: 2023-03-31
Attending: OBSTETRICS & GYNECOLOGY
Payer: COMMERCIAL

## 2023-03-31 ENCOUNTER — OFFICE VISIT (OUTPATIENT)
Dept: MATERNAL FETAL MEDICINE | Facility: CLINIC | Age: 33
End: 2023-03-31
Attending: OBSTETRICS & GYNECOLOGY
Payer: COMMERCIAL

## 2023-03-31 ENCOUNTER — PRENATAL OFFICE VISIT (OUTPATIENT)
Dept: OBGYN | Facility: CLINIC | Age: 33
End: 2023-03-31
Payer: COMMERCIAL

## 2023-03-31 VITALS
SYSTOLIC BLOOD PRESSURE: 103 MMHG | DIASTOLIC BLOOD PRESSURE: 69 MMHG | TEMPERATURE: 98.5 F | WEIGHT: 169.8 LBS | BODY MASS INDEX: 27.41 KG/M2 | HEART RATE: 80 BPM

## 2023-03-31 DIAGNOSIS — O99.013 ANEMIA DURING PREGNANCY IN THIRD TRIMESTER: ICD-10-CM

## 2023-03-31 DIAGNOSIS — O36.63X0 LARGE FOR GESTATIONAL AGE FETUS AFFECTING MOTHER, ANTEPARTUM, THIRD TRIMESTER, SINGLE GESTATION: ICD-10-CM

## 2023-03-31 DIAGNOSIS — O44.00 PLACENTA PREVIA ANTEPARTUM: ICD-10-CM

## 2023-03-31 DIAGNOSIS — O44.00 PLACENTA PREVIA ANTEPARTUM: Primary | ICD-10-CM

## 2023-03-31 DIAGNOSIS — Z34.83 ENCOUNTER FOR SUPERVISION OF OTHER NORMAL PREGNANCY IN THIRD TRIMESTER: Primary | ICD-10-CM

## 2023-03-31 LAB
ERYTHROCYTE [DISTWIDTH] IN BLOOD BY AUTOMATED COUNT: 13.8 % (ref 10–15)
FERRITIN SERPL-MCNC: 22 NG/ML (ref 6–175)
HCT VFR BLD AUTO: 33.2 % (ref 35–47)
HGB BLD-MCNC: 11.1 G/DL (ref 11.7–15.7)
IRON BINDING CAPACITY (ROCHE): 375 UG/DL (ref 240–430)
IRON SATN MFR SERPL: 60 % (ref 15–46)
IRON SERPL-MCNC: 225 UG/DL (ref 37–145)
MCH RBC QN AUTO: 31.3 PG (ref 26.5–33)
MCHC RBC AUTO-ENTMCNC: 33.4 G/DL (ref 31.5–36.5)
MCV RBC AUTO: 94 FL (ref 78–100)
PLATELET # BLD AUTO: 154 10E3/UL (ref 150–450)
RBC # BLD AUTO: 3.55 10E6/UL (ref 3.8–5.2)
WBC # BLD AUTO: 9 10E3/UL (ref 4–11)

## 2023-03-31 PROCEDURE — 83550 IRON BINDING TEST: CPT | Performed by: OBSTETRICS & GYNECOLOGY

## 2023-03-31 PROCEDURE — 99207 PR PRENATAL VISIT: CPT | Performed by: OBSTETRICS & GYNECOLOGY

## 2023-03-31 PROCEDURE — 76816 OB US FOLLOW-UP PER FETUS: CPT

## 2023-03-31 PROCEDURE — 82728 ASSAY OF FERRITIN: CPT | Performed by: OBSTETRICS & GYNECOLOGY

## 2023-03-31 PROCEDURE — 83540 ASSAY OF IRON: CPT | Performed by: OBSTETRICS & GYNECOLOGY

## 2023-03-31 PROCEDURE — 36415 COLL VENOUS BLD VENIPUNCTURE: CPT | Performed by: OBSTETRICS & GYNECOLOGY

## 2023-03-31 PROCEDURE — 76816 OB US FOLLOW-UP PER FETUS: CPT | Mod: 26 | Performed by: OBSTETRICS & GYNECOLOGY

## 2023-03-31 PROCEDURE — 85027 COMPLETE CBC AUTOMATED: CPT | Performed by: OBSTETRICS & GYNECOLOGY

## 2023-03-31 NOTE — PROGRESS NOTES
"Please see \"Imaging\" tab under \"Chart Review\" for details of today's US at the [facility].    Miles Sosa MD  Maternal-Fetal Medicine      "

## 2023-04-06 NOTE — PROGRESS NOTES
Doing well.   Good fetal movement.   Ultrasound today for placenta previa. Resolved, but fetal macrosomia present.     FETAL BIOMETRY  ---------------------------------------------------------------------------------------------------------  Main Fetal Biometry:  BPD                                        86.3                    mm                         34w 6d                Hadlock  OFD                                        115.1                  mm                          36w 1d                Nicolaides  HC                                          320.6                  mm                          36w 1d                Hadlock  Cerebellum tr                            43.3                   mm                          37w 2d                Nicolaides  AC                                          314.1                  mm                          35w 2d        98%        Hadlock  Femur                                      64.9                   mm                          33w 3d                Hadlock  Fetal Weight Calculation:  EFW                                       2,537                  g                                     95%        Hadlock  EFW (lb,oz)                             5 lb 9                  oz      Other children were smaller.   CS for second child for breech.   Discussed relative inaccuracy of third trimester ultrasound for growth estimation - could be off in either direction.   Discussed possible increased risk of failed TOLAC, shoulder dystocia. Not contra-indicated for trial of labor.   Will consider and repeat growth US in 4 weeks.   Recheck iron today.   RTC 2 weeks.     (O36.63X0) Large for gestational age fetus affecting mother, antepartum, third trimester, single gestation  (primary encounter diagnosis)  Comment:   Plan: US OB >14 Weeks Follow Up            (O99.013) Anemia during pregnancy in third trimester  Comment:   Plan: CBC with platelets, Iron and iron binding          capacity, Ferritin

## 2023-04-18 ENCOUNTER — PRENATAL OFFICE VISIT (OUTPATIENT)
Dept: OBGYN | Facility: CLINIC | Age: 33
End: 2023-04-18
Payer: COMMERCIAL

## 2023-04-18 VITALS
HEART RATE: 83 BPM | WEIGHT: 176 LBS | SYSTOLIC BLOOD PRESSURE: 111 MMHG | DIASTOLIC BLOOD PRESSURE: 64 MMHG | TEMPERATURE: 97.2 F | BODY MASS INDEX: 28.41 KG/M2

## 2023-04-18 DIAGNOSIS — Z34.83 ENCOUNTER FOR SUPERVISION OF OTHER NORMAL PREGNANCY IN THIRD TRIMESTER: Primary | ICD-10-CM

## 2023-04-18 PROCEDURE — 99207 PR PRENATAL VISIT: CPT | Performed by: OBSTETRICS & GYNECOLOGY

## 2023-04-18 NOTE — PROGRESS NOTES
Doing well.   Good fetal movement.   Cephalic on US today - feels position has been changing.   Growth US next week. GBS, hgb then.  RTC one week.

## 2023-04-28 ENCOUNTER — ANCILLARY PROCEDURE (OUTPATIENT)
Dept: ULTRASOUND IMAGING | Facility: CLINIC | Age: 33
End: 2023-04-28
Attending: OBSTETRICS & GYNECOLOGY
Payer: COMMERCIAL

## 2023-04-28 ENCOUNTER — PRENATAL OFFICE VISIT (OUTPATIENT)
Dept: OBGYN | Facility: CLINIC | Age: 33
End: 2023-04-28
Payer: COMMERCIAL

## 2023-04-28 VITALS
WEIGHT: 174.2 LBS | BODY MASS INDEX: 28.12 KG/M2 | DIASTOLIC BLOOD PRESSURE: 76 MMHG | SYSTOLIC BLOOD PRESSURE: 114 MMHG | TEMPERATURE: 98.5 F | HEART RATE: 72 BPM

## 2023-04-28 DIAGNOSIS — O32.2XX0 TRANSVERSE OR OBLIQUE FETAL PRESENTATION, SINGLE OR UNSPECIFIED FETUS: ICD-10-CM

## 2023-04-28 DIAGNOSIS — O36.63X0 LARGE FOR GESTATIONAL AGE FETUS AFFECTING MOTHER, ANTEPARTUM, THIRD TRIMESTER, SINGLE GESTATION: ICD-10-CM

## 2023-04-28 DIAGNOSIS — Z34.83 ENCOUNTER FOR SUPERVISION OF OTHER NORMAL PREGNANCY IN THIRD TRIMESTER: Primary | ICD-10-CM

## 2023-04-28 LAB — HGB BLD-MCNC: 12 G/DL (ref 11.7–15.7)

## 2023-04-28 PROCEDURE — 99207 PR PRENATAL VISIT: CPT | Performed by: OBSTETRICS & GYNECOLOGY

## 2023-04-28 PROCEDURE — 76816 OB US FOLLOW-UP PER FETUS: CPT | Performed by: OBSTETRICS & GYNECOLOGY

## 2023-04-28 PROCEDURE — 87653 STREP B DNA AMP PROBE: CPT | Performed by: OBSTETRICS & GYNECOLOGY

## 2023-04-28 PROCEDURE — 36415 COLL VENOUS BLD VENIPUNCTURE: CPT | Performed by: OBSTETRICS & GYNECOLOGY

## 2023-04-28 ASSESSMENT — ANXIETY QUESTIONNAIRES
GAD7 TOTAL SCORE: 5
7. FEELING AFRAID AS IF SOMETHING AWFUL MIGHT HAPPEN: NOT AT ALL
GAD7 TOTAL SCORE: 5
5. BEING SO RESTLESS THAT IT IS HARD TO SIT STILL: SEVERAL DAYS
IF YOU CHECKED OFF ANY PROBLEMS ON THIS QUESTIONNAIRE, HOW DIFFICULT HAVE THESE PROBLEMS MADE IT FOR YOU TO DO YOUR WORK, TAKE CARE OF THINGS AT HOME, OR GET ALONG WITH OTHER PEOPLE: SOMEWHAT DIFFICULT
1. FEELING NERVOUS, ANXIOUS, OR ON EDGE: NOT AT ALL
6. BECOMING EASILY ANNOYED OR IRRITABLE: SEVERAL DAYS
3. WORRYING TOO MUCH ABOUT DIFFERENT THINGS: SEVERAL DAYS
2. NOT BEING ABLE TO STOP OR CONTROL WORRYING: NOT AT ALL

## 2023-04-28 ASSESSMENT — PATIENT HEALTH QUESTIONNAIRE - PHQ9
SUM OF ALL RESPONSES TO PHQ QUESTIONS 1-9: 4
5. POOR APPETITE OR OVEREATING: MORE THAN HALF THE DAYS

## 2023-04-29 LAB — GP B STREP DNA SPEC QL NAA+PROBE: NEGATIVE

## 2023-05-02 ENCOUNTER — TELEPHONE (OUTPATIENT)
Dept: OBGYN | Facility: CLINIC | Age: 33
End: 2023-05-02
Payer: COMMERCIAL

## 2023-05-02 ENCOUNTER — HOSPITAL ENCOUNTER (INPATIENT)
Facility: CLINIC | Age: 33
Setting detail: SURGERY ADMIT
End: 2023-05-02
Attending: OBSTETRICS & GYNECOLOGY | Admitting: OBSTETRICS & GYNECOLOGY
Payer: COMMERCIAL

## 2023-05-02 NOTE — PROGRESS NOTES
Doing ok.   Good fetal movement.   Growth US today:    Obstetrical Ultrasound Report  OB U/S Follow Up > 14 Weeks - Transabdominal   [facility] Obstetrics and Gynecology  Referring physician: Dr. Iraida Paz  Sonographer: Christina Gonzalez RDMS  Indication:  F/U Growth     Dating (mm/dd/yyyy):   LMP: 08/15/2022.               EDC:  May 22, 2023   GA by LMP:  36w4d     Current Scan On (mm/dd/yyyy):  2023                       EDC:   23             GA by Current Scan:      37w6d  The calculation of the gestational age by current scan was based on BPD, HC, AC and FL.     Anatomy Scan:  Dietrich gestation.  Visualized: Technically difficult due to Poor Visualization Due To: fetal position.  Biometry:  BPD 9.4 cm 38w2d 94.5%   HC 34.2 cm 39w3d 86.3%   AC 33.4 cm 37w2d 80.6%   FL 7.1 cm 36w2d 38.4%   EFW (lbs/oz) 7 lbs               1ozs       EFW (g) 3198 g 74.9%        Fetal heart rate: 126 bpm  Fetal presentation: Transverse, head maternal right  Amniotic fluid: 6.7cm MVP  Placenta: Anterior , placental edge not visualized     Impression:   Growth is appropriate for gestational age.  EFW by today's ultrasound is 3198grams, which is the 75%tile.  BPD is 95%ile.  Normal MVP, TRANSVERSE presentation.     Liz Carbajal MD           Discussed transverse lie. Back posterior.   Reviewed labor and SROM precautions.   Discussed options for management: ECV at 37 weeks (failed in last pregnancy), expectant management until 39 weeks then mode of delivery depending on fetal lie, ECV at 39 weeks with IOL and not sooner given unstable lie.   Doesn't want to do early ECV as baby reverted to breech after last ECV.   Will proceed with expectant management for now, but plan  at 39 weeks if needed.

## 2023-05-02 NOTE — TELEPHONE ENCOUNTER
GIANFRANCOCB to finalize c/s scheduling with AO for DOS 05/17/23.     Carol  She/her/hers  [facility] Scheduler

## 2023-05-05 ENCOUNTER — PRENATAL OFFICE VISIT (OUTPATIENT)
Dept: OBGYN | Facility: CLINIC | Age: 33
End: 2023-05-05
Payer: COMMERCIAL

## 2023-05-05 VITALS
WEIGHT: 174.7 LBS | HEART RATE: 68 BPM | BODY MASS INDEX: 28.2 KG/M2 | TEMPERATURE: 97.6 F | SYSTOLIC BLOOD PRESSURE: 115 MMHG | DIASTOLIC BLOOD PRESSURE: 75 MMHG

## 2023-05-05 DIAGNOSIS — Z34.83 ENCOUNTER FOR SUPERVISION OF OTHER NORMAL PREGNANCY IN THIRD TRIMESTER: Primary | ICD-10-CM

## 2023-05-05 PROCEDURE — 99207 PR PRENATAL VISIT: CPT | Performed by: OBSTETRICS & GYNECOLOGY

## 2023-05-05 NOTE — PROGRESS NOTES
GBS: Negative  Hemoglobin   Date Value Ref Range Status   04/28/2023 12.0 11.7 - 15.7 g/dL Final   07/17/2019 13.4 11.7 - 15.7 g/dL Final   ]    Breast pump rx: ordered  Labor orders: signed and held  Birth plan: TOLAC if remains cephalic  Length of stay: discussed  Disability paperwork: NA  Resident involvement: discussed and agrees.  PP meds OTC    Good fetal movement.   No signs of labor.   Cephalic on US.   RTC weekly

## 2023-05-06 ENCOUNTER — HEALTH MAINTENANCE LETTER (OUTPATIENT)
Age: 33
End: 2023-05-06

## 2023-05-10 ENCOUNTER — PRENATAL OFFICE VISIT (OUTPATIENT)
Dept: OBGYN | Facility: CLINIC | Age: 33
End: 2023-05-10
Payer: COMMERCIAL

## 2023-05-10 VITALS
WEIGHT: 177.8 LBS | DIASTOLIC BLOOD PRESSURE: 72 MMHG | HEART RATE: 70 BPM | BODY MASS INDEX: 28.7 KG/M2 | SYSTOLIC BLOOD PRESSURE: 113 MMHG

## 2023-05-10 DIAGNOSIS — Z34.83 ENCOUNTER FOR SUPERVISION OF OTHER NORMAL PREGNANCY IN THIRD TRIMESTER: Primary | ICD-10-CM

## 2023-05-10 PROCEDURE — 99207 PR PRENATAL VISIT: CPT | Performed by: OBSTETRICS & GYNECOLOGY

## 2023-05-11 RX ORDER — MISOPROSTOL 100 UG/1
400 TABLET ORAL
Status: CANCELLED | OUTPATIENT
Start: 2023-05-11

## 2023-05-11 RX ORDER — KETOROLAC TROMETHAMINE 30 MG/ML
30 INJECTION, SOLUTION INTRAMUSCULAR; INTRAVENOUS
Status: CANCELLED | OUTPATIENT
Start: 2023-05-11 | End: 2023-05-16

## 2023-05-11 RX ORDER — CITRIC ACID/SODIUM CITRATE 334-500MG
30 SOLUTION, ORAL ORAL
Status: CANCELLED | OUTPATIENT
Start: 2023-05-11

## 2023-05-11 RX ORDER — CARBOPROST TROMETHAMINE 250 UG/ML
250 INJECTION, SOLUTION INTRAMUSCULAR
Status: CANCELLED | OUTPATIENT
Start: 2023-05-11

## 2023-05-11 RX ORDER — OXYTOCIN/0.9 % SODIUM CHLORIDE 30/500 ML
100-340 PLASTIC BAG, INJECTION (ML) INTRAVENOUS CONTINUOUS PRN
Status: CANCELLED | OUTPATIENT
Start: 2023-05-11

## 2023-05-11 RX ORDER — NALOXONE HYDROCHLORIDE 0.4 MG/ML
0.4 INJECTION, SOLUTION INTRAMUSCULAR; INTRAVENOUS; SUBCUTANEOUS
Status: CANCELLED | OUTPATIENT
Start: 2023-05-11

## 2023-05-11 RX ORDER — METHYLERGONOVINE MALEATE 0.2 MG/ML
200 INJECTION INTRAVENOUS
Status: CANCELLED | OUTPATIENT
Start: 2023-05-11

## 2023-05-11 RX ORDER — OXYTOCIN/0.9 % SODIUM CHLORIDE 30/500 ML
340 PLASTIC BAG, INJECTION (ML) INTRAVENOUS CONTINUOUS PRN
Status: CANCELLED | OUTPATIENT
Start: 2023-05-11

## 2023-05-11 RX ORDER — NALOXONE HYDROCHLORIDE 0.4 MG/ML
0.2 INJECTION, SOLUTION INTRAMUSCULAR; INTRAVENOUS; SUBCUTANEOUS
Status: CANCELLED | OUTPATIENT
Start: 2023-05-11

## 2023-05-11 RX ORDER — ONDANSETRON 4 MG/1
4 TABLET, ORALLY DISINTEGRATING ORAL EVERY 6 HOURS PRN
Status: CANCELLED | OUTPATIENT
Start: 2023-05-11

## 2023-05-11 RX ORDER — AMOXICILLIN 250 MG
1 CAPSULE ORAL DAILY
Status: ON HOLD | COMMUNITY
Start: 2023-05-11 | End: 2023-05-31

## 2023-05-11 RX ORDER — FENTANYL CITRATE 50 UG/ML
50 INJECTION, SOLUTION INTRAMUSCULAR; INTRAVENOUS EVERY 30 MIN PRN
Status: CANCELLED | OUTPATIENT
Start: 2023-05-11

## 2023-05-11 RX ORDER — METOCLOPRAMIDE 5 MG/1
10 TABLET ORAL EVERY 6 HOURS PRN
Status: CANCELLED | OUTPATIENT
Start: 2023-05-11

## 2023-05-11 RX ORDER — IBUPROFEN 200 MG
600 TABLET ORAL EVERY 6 HOURS PRN
Status: ON HOLD | COMMUNITY
Start: 2023-05-11 | End: 2023-05-31

## 2023-05-11 RX ORDER — OXYTOCIN 10 [USP'U]/ML
10 INJECTION, SOLUTION INTRAMUSCULAR; INTRAVENOUS
Status: CANCELLED | OUTPATIENT
Start: 2023-05-11

## 2023-05-11 RX ORDER — PROCHLORPERAZINE 25 MG
25 SUPPOSITORY, RECTAL RECTAL EVERY 12 HOURS PRN
Status: CANCELLED | OUTPATIENT
Start: 2023-05-11

## 2023-05-11 RX ORDER — IBUPROFEN 200 MG
800 TABLET ORAL
Status: CANCELLED | OUTPATIENT
Start: 2023-05-11 | End: 2023-05-16

## 2023-05-11 RX ORDER — METOCLOPRAMIDE HYDROCHLORIDE 5 MG/ML
10 INJECTION INTRAMUSCULAR; INTRAVENOUS EVERY 6 HOURS PRN
Status: CANCELLED | OUTPATIENT
Start: 2023-05-11

## 2023-05-11 RX ORDER — ACETAMINOPHEN 325 MG/1
650 TABLET ORAL EVERY 6 HOURS PRN
Status: ON HOLD | COMMUNITY
Start: 2023-05-11 | End: 2023-05-31

## 2023-05-11 RX ORDER — LIDOCAINE 40 MG/G
CREAM TOPICAL
Status: CANCELLED | OUTPATIENT
Start: 2023-05-11

## 2023-05-11 RX ORDER — MISOPROSTOL 200 UG/1
800 TABLET ORAL
Status: CANCELLED | OUTPATIENT
Start: 2023-05-11

## 2023-05-11 RX ORDER — PROCHLORPERAZINE MALEATE 5 MG
10 TABLET ORAL EVERY 6 HOURS PRN
Status: CANCELLED | OUTPATIENT
Start: 2023-05-11

## 2023-05-11 RX ORDER — ONDANSETRON 2 MG/ML
4 INJECTION INTRAMUSCULAR; INTRAVENOUS EVERY 6 HOURS PRN
Status: CANCELLED | OUTPATIENT
Start: 2023-05-11

## 2023-05-11 NOTE — PROGRESS NOTES
Doing well.   Good fetal movement.   No signs of labor. More pelvis pressure.   Still cephalic on US. Will cancel CS if remains cephalic next week.   TOLAC consent held and signed.   RTC weekly

## 2023-05-15 ENCOUNTER — PRENATAL OFFICE VISIT (OUTPATIENT)
Dept: OBGYN | Facility: CLINIC | Age: 33
End: 2023-05-15
Payer: COMMERCIAL

## 2023-05-15 ENCOUNTER — APPOINTMENT (OUTPATIENT)
Dept: LAB | Facility: CLINIC | Age: 33
End: 2023-05-15
Payer: COMMERCIAL

## 2023-05-15 VITALS
TEMPERATURE: 97.4 F | SYSTOLIC BLOOD PRESSURE: 123 MMHG | WEIGHT: 180 LBS | HEART RATE: 70 BPM | BODY MASS INDEX: 29.05 KG/M2 | DIASTOLIC BLOOD PRESSURE: 78 MMHG

## 2023-05-15 DIAGNOSIS — Z34.83 ENCOUNTER FOR SUPERVISION OF OTHER NORMAL PREGNANCY IN THIRD TRIMESTER: Primary | ICD-10-CM

## 2023-05-15 PROCEDURE — 99207 PR PRENATAL VISIT: CPT | Performed by: OBSTETRICS & GYNECOLOGY

## 2023-05-23 ENCOUNTER — PRENATAL OFFICE VISIT (OUTPATIENT)
Dept: OBGYN | Facility: CLINIC | Age: 33
End: 2023-05-23
Payer: COMMERCIAL

## 2023-05-23 VITALS
OXYGEN SATURATION: 98 % | HEART RATE: 68 BPM | DIASTOLIC BLOOD PRESSURE: 81 MMHG | WEIGHT: 177.7 LBS | SYSTOLIC BLOOD PRESSURE: 123 MMHG | BODY MASS INDEX: 28.68 KG/M2

## 2023-05-23 DIAGNOSIS — O48.0 POST-TERM PREGNANCY, 40-42 WEEKS OF GESTATION: Primary | ICD-10-CM

## 2023-05-23 PROCEDURE — 59426 ANTEPARTUM CARE ONLY: CPT | Performed by: OBSTETRICS & GYNECOLOGY

## 2023-05-23 PROCEDURE — 99207 PR PRENATAL VISIT: CPT | Performed by: OBSTETRICS & GYNECOLOGY

## 2023-05-23 NOTE — Clinical Note
Hey,  This patient and her  are super sweet and scheduled for IOL with you on Tuesday. She's a TOLAC, but she's had a vaginal delivery first. Then CS for breech, failed version.  Very effaced today and I stripped her membranes, so hopefully she will labor before then! LMK if you're not ok with that plan and I'll reschedule her.  AO

## 2023-05-24 NOTE — PROGRESS NOTES
Doing well.   Good fetal movement.   Intermittent contractions. Cervix more effaced. Membranes stripped after consent.   Labor precautions reviewed.   BPP in 3 days for post-dates monitoring.   IOL scheduled for 5/30 with hussein balloon vs pitocin.

## 2023-05-26 ENCOUNTER — ANCILLARY PROCEDURE (OUTPATIENT)
Dept: ULTRASOUND IMAGING | Facility: CLINIC | Age: 33
End: 2023-05-26
Attending: OBSTETRICS & GYNECOLOGY
Payer: COMMERCIAL

## 2023-05-26 DIAGNOSIS — O48.0 POST-TERM PREGNANCY, 40-42 WEEKS OF GESTATION: ICD-10-CM

## 2023-05-26 PROCEDURE — 76819 FETAL BIOPHYS PROFIL W/O NST: CPT | Performed by: OBSTETRICS & GYNECOLOGY

## 2023-05-30 ENCOUNTER — HOSPITAL ENCOUNTER (INPATIENT)
Facility: CLINIC | Age: 33
LOS: 2 days | Discharge: HOME | End: 2023-06-01
Attending: OBSTETRICS & GYNECOLOGY | Admitting: OBSTETRICS & GYNECOLOGY
Payer: COMMERCIAL

## 2023-05-30 ENCOUNTER — ANESTHESIA EVENT (OUTPATIENT)
Dept: OBGYN | Facility: CLINIC | Age: 33
End: 2023-05-30
Payer: COMMERCIAL

## 2023-05-30 ENCOUNTER — NURSE TRIAGE (OUTPATIENT)
Dept: NURSING | Facility: CLINIC | Age: 33
End: 2023-05-30

## 2023-05-30 ENCOUNTER — ANESTHESIA (OUTPATIENT)
Dept: OBGYN | Facility: CLINIC | Age: 33
End: 2023-05-30
Payer: COMMERCIAL

## 2023-05-30 DIAGNOSIS — Z34.83 ENCOUNTER FOR SUPERVISION OF OTHER NORMAL PREGNANCY IN THIRD TRIMESTER: ICD-10-CM

## 2023-05-30 LAB
ABO/RH(D): NORMAL
ANTIBODY SCREEN: NEGATIVE
HGB BLD-MCNC: 12.5 G/DL (ref 11.7–15.7)
PLATELET # BLD AUTO: 135 10E3/UL (ref 150–450)
RUPTURE OF FETAL MEMBRANES BY ROM PLUS: NEGATIVE
SARS-COV-2 RNA RESP QL NAA+PROBE: NEGATIVE
SPECIMEN EXPIRATION DATE: NORMAL
T PALLIDUM AB SER QL: NONREACTIVE

## 2023-05-30 PROCEDURE — 36415 COLL VENOUS BLD VENIPUNCTURE: CPT | Performed by: STUDENT IN AN ORGANIZED HEALTH CARE EDUCATION/TRAINING PROGRAM

## 2023-05-30 PROCEDURE — 258N000003 HC RX IP 258 OP 636

## 2023-05-30 PROCEDURE — 120N000002 HC R&B MED SURG/OB UMMC

## 2023-05-30 PROCEDURE — 722N000001 HC LABOR CARE VAGINAL DELIVERY SINGLE

## 2023-05-30 PROCEDURE — 85049 AUTOMATED PLATELET COUNT: CPT | Performed by: STUDENT IN AN ORGANIZED HEALTH CARE EDUCATION/TRAINING PROGRAM

## 2023-05-30 PROCEDURE — 370N000003 HC ANESTHESIA WARD SERVICE: Performed by: STUDENT IN AN ORGANIZED HEALTH CARE EDUCATION/TRAINING PROGRAM

## 2023-05-30 PROCEDURE — 0KQM0ZZ REPAIR PERINEUM MUSCLE, OPEN APPROACH: ICD-10-PCS | Performed by: OBSTETRICS & GYNECOLOGY

## 2023-05-30 PROCEDURE — 86780 TREPONEMA PALLIDUM: CPT | Performed by: STUDENT IN AN ORGANIZED HEALTH CARE EDUCATION/TRAINING PROGRAM

## 2023-05-30 PROCEDURE — 250N000011 HC RX IP 250 OP 636

## 2023-05-30 PROCEDURE — 84112 EVAL AMNIOTIC FLUID PROTEIN: CPT | Performed by: STUDENT IN AN ORGANIZED HEALTH CARE EDUCATION/TRAINING PROGRAM

## 2023-05-30 PROCEDURE — 258N000003 HC RX IP 258 OP 636: Performed by: STUDENT IN AN ORGANIZED HEALTH CARE EDUCATION/TRAINING PROGRAM

## 2023-05-30 PROCEDURE — 10907ZC DRAINAGE OF AMNIOTIC FLUID, THERAPEUTIC FROM PRODUCTS OF CONCEPTION, VIA NATURAL OR ARTIFICIAL OPENING: ICD-10-PCS | Performed by: OBSTETRICS & GYNECOLOGY

## 2023-05-30 PROCEDURE — C9803 HOPD COVID-19 SPEC COLLECT: HCPCS

## 2023-05-30 PROCEDURE — 00HU33Z INSERTION OF INFUSION DEVICE INTO SPINAL CANAL, PERCUTANEOUS APPROACH: ICD-10-PCS | Performed by: STUDENT IN AN ORGANIZED HEALTH CARE EDUCATION/TRAINING PROGRAM

## 2023-05-30 PROCEDURE — 250N000009 HC RX 250: Performed by: STUDENT IN AN ORGANIZED HEALTH CARE EDUCATION/TRAINING PROGRAM

## 2023-05-30 PROCEDURE — 250N000011 HC RX IP 250 OP 636: Performed by: STUDENT IN AN ORGANIZED HEALTH CARE EDUCATION/TRAINING PROGRAM

## 2023-05-30 PROCEDURE — 86850 RBC ANTIBODY SCREEN: CPT | Performed by: STUDENT IN AN ORGANIZED HEALTH CARE EDUCATION/TRAINING PROGRAM

## 2023-05-30 PROCEDURE — 3E0R3BZ INTRODUCTION OF ANESTHETIC AGENT INTO SPINAL CANAL, PERCUTANEOUS APPROACH: ICD-10-PCS | Performed by: STUDENT IN AN ORGANIZED HEALTH CARE EDUCATION/TRAINING PROGRAM

## 2023-05-30 PROCEDURE — 87635 SARS-COV-2 COVID-19 AMP PRB: CPT | Performed by: STUDENT IN AN ORGANIZED HEALTH CARE EDUCATION/TRAINING PROGRAM

## 2023-05-30 PROCEDURE — 85018 HEMOGLOBIN: CPT | Performed by: STUDENT IN AN ORGANIZED HEALTH CARE EDUCATION/TRAINING PROGRAM

## 2023-05-30 PROCEDURE — 3E033VJ INTRODUCTION OF OTHER HORMONE INTO PERIPHERAL VEIN, PERCUTANEOUS APPROACH: ICD-10-PCS | Performed by: OBSTETRICS & GYNECOLOGY

## 2023-05-30 RX ORDER — LIDOCAINE 40 MG/G
CREAM TOPICAL
Status: DISCONTINUED | OUTPATIENT
Start: 2023-05-30 | End: 2023-05-30 | Stop reason: HOSPADM

## 2023-05-30 RX ORDER — ONDANSETRON 2 MG/ML
4 INJECTION INTRAMUSCULAR; INTRAVENOUS EVERY 6 HOURS PRN
Status: DISCONTINUED | OUTPATIENT
Start: 2023-05-30 | End: 2023-05-30 | Stop reason: HOSPADM

## 2023-05-30 RX ORDER — NALOXONE HYDROCHLORIDE 0.4 MG/ML
0.2 INJECTION, SOLUTION INTRAMUSCULAR; INTRAVENOUS; SUBCUTANEOUS
Status: DISCONTINUED | OUTPATIENT
Start: 2023-05-30 | End: 2023-05-30 | Stop reason: HOSPADM

## 2023-05-30 RX ORDER — BISACODYL 10 MG
10 SUPPOSITORY, RECTAL RECTAL DAILY PRN
Status: DISCONTINUED | OUTPATIENT
Start: 2023-05-30 | End: 2023-06-01 | Stop reason: HOSPADM

## 2023-05-30 RX ORDER — LIDOCAINE HYDROCHLORIDE AND EPINEPHRINE 15; 5 MG/ML; UG/ML
3 INJECTION, SOLUTION EPIDURAL
Status: DISCONTINUED | OUTPATIENT
Start: 2023-05-30 | End: 2023-05-30 | Stop reason: HOSPADM

## 2023-05-30 RX ORDER — OXYTOCIN 10 [USP'U]/ML
INJECTION, SOLUTION INTRAMUSCULAR; INTRAVENOUS
Status: DISCONTINUED
Start: 2023-05-30 | End: 2023-05-30 | Stop reason: HOSPADM

## 2023-05-30 RX ORDER — PROCHLORPERAZINE 25 MG
25 SUPPOSITORY, RECTAL RECTAL EVERY 12 HOURS PRN
Status: DISCONTINUED | OUTPATIENT
Start: 2023-05-30 | End: 2023-05-30 | Stop reason: HOSPADM

## 2023-05-30 RX ORDER — FERROUS GLUCONATE 324(38)MG
324 TABLET ORAL
COMMUNITY

## 2023-05-30 RX ORDER — MODIFIED LANOLIN
OINTMENT (GRAM) TOPICAL
Status: DISCONTINUED | OUTPATIENT
Start: 2023-05-30 | End: 2023-06-01 | Stop reason: HOSPADM

## 2023-05-30 RX ORDER — LIDOCAINE HYDROCHLORIDE 10 MG/ML
INJECTION, SOLUTION EPIDURAL; INFILTRATION; INTRACAUDAL; PERINEURAL
Status: DISCONTINUED
Start: 2023-05-30 | End: 2023-05-30 | Stop reason: WASHOUT

## 2023-05-30 RX ORDER — MISOPROSTOL 200 UG/1
400 TABLET ORAL
Status: DISCONTINUED | OUTPATIENT
Start: 2023-05-30 | End: 2023-05-30 | Stop reason: HOSPADM

## 2023-05-30 RX ORDER — HYDROCORTISONE 25 MG/G
CREAM TOPICAL 3 TIMES DAILY PRN
Status: DISCONTINUED | OUTPATIENT
Start: 2023-05-30 | End: 2023-06-01 | Stop reason: HOSPADM

## 2023-05-30 RX ORDER — ACETAMINOPHEN 325 MG/1
650 TABLET ORAL EVERY 4 HOURS PRN
Status: DISCONTINUED | OUTPATIENT
Start: 2023-05-30 | End: 2023-06-01 | Stop reason: HOSPADM

## 2023-05-30 RX ORDER — NALOXONE HYDROCHLORIDE 0.4 MG/ML
0.4 INJECTION, SOLUTION INTRAMUSCULAR; INTRAVENOUS; SUBCUTANEOUS
Status: DISCONTINUED | OUTPATIENT
Start: 2023-05-30 | End: 2023-05-30 | Stop reason: HOSPADM

## 2023-05-30 RX ORDER — CITRIC ACID/SODIUM CITRATE 334-500MG
30 SOLUTION, ORAL ORAL
Status: DISCONTINUED | OUTPATIENT
Start: 2023-05-30 | End: 2023-05-30 | Stop reason: HOSPADM

## 2023-05-30 RX ORDER — CARBOPROST TROMETHAMINE 250 UG/ML
250 INJECTION, SOLUTION INTRAMUSCULAR
Status: DISCONTINUED | OUTPATIENT
Start: 2023-05-30 | End: 2023-06-01 | Stop reason: HOSPADM

## 2023-05-30 RX ORDER — IBUPROFEN 800 MG/1
800 TABLET, FILM COATED ORAL
Status: DISCONTINUED | OUTPATIENT
Start: 2023-05-30 | End: 2023-06-01 | Stop reason: HOSPADM

## 2023-05-30 RX ORDER — MISOPROSTOL 200 UG/1
800 TABLET ORAL
Status: DISCONTINUED | OUTPATIENT
Start: 2023-05-30 | End: 2023-06-01 | Stop reason: HOSPADM

## 2023-05-30 RX ORDER — OXYTOCIN 10 [USP'U]/ML
10 INJECTION, SOLUTION INTRAMUSCULAR; INTRAVENOUS
Status: DISCONTINUED | OUTPATIENT
Start: 2023-05-30 | End: 2023-06-01 | Stop reason: HOSPADM

## 2023-05-30 RX ORDER — METOCLOPRAMIDE 10 MG/1
10 TABLET ORAL EVERY 6 HOURS PRN
Status: DISCONTINUED | OUTPATIENT
Start: 2023-05-30 | End: 2023-05-30 | Stop reason: HOSPADM

## 2023-05-30 RX ORDER — NALBUPHINE HYDROCHLORIDE 10 MG/ML
2.5-5 INJECTION, SOLUTION INTRAMUSCULAR; INTRAVENOUS; SUBCUTANEOUS EVERY 6 HOURS PRN
Status: DISCONTINUED | OUTPATIENT
Start: 2023-05-30 | End: 2023-06-01 | Stop reason: HOSPADM

## 2023-05-30 RX ORDER — PROCHLORPERAZINE MALEATE 10 MG
10 TABLET ORAL EVERY 6 HOURS PRN
Status: DISCONTINUED | OUTPATIENT
Start: 2023-05-30 | End: 2023-05-30 | Stop reason: HOSPADM

## 2023-05-30 RX ORDER — OXYTOCIN/0.9 % SODIUM CHLORIDE 30/500 ML
340 PLASTIC BAG, INJECTION (ML) INTRAVENOUS CONTINUOUS PRN
Status: DISCONTINUED | OUTPATIENT
Start: 2023-05-30 | End: 2023-05-30 | Stop reason: HOSPADM

## 2023-05-30 RX ORDER — TRANEXAMIC ACID 10 MG/ML
1 INJECTION, SOLUTION INTRAVENOUS EVERY 30 MIN PRN
Status: DISCONTINUED | OUTPATIENT
Start: 2023-05-30 | End: 2023-05-30 | Stop reason: HOSPADM

## 2023-05-30 RX ORDER — MISOPROSTOL 200 UG/1
400 TABLET ORAL
Status: DISCONTINUED | OUTPATIENT
Start: 2023-05-30 | End: 2023-06-01 | Stop reason: HOSPADM

## 2023-05-30 RX ORDER — FENTANYL CITRATE-0.9 % NACL/PF 10 MCG/ML
100 PLASTIC BAG, INJECTION (ML) INTRAVENOUS EVERY 5 MIN PRN
Status: DISCONTINUED | OUTPATIENT
Start: 2023-05-30 | End: 2023-05-30 | Stop reason: HOSPADM

## 2023-05-30 RX ORDER — ONDANSETRON 4 MG/1
4 TABLET, ORALLY DISINTEGRATING ORAL EVERY 6 HOURS PRN
Status: DISCONTINUED | OUTPATIENT
Start: 2023-05-30 | End: 2023-05-30 | Stop reason: HOSPADM

## 2023-05-30 RX ORDER — MISOPROSTOL 200 UG/1
TABLET ORAL
Status: DISCONTINUED
Start: 2023-05-30 | End: 2023-05-30 | Stop reason: HOSPADM

## 2023-05-30 RX ORDER — IBUPROFEN 800 MG/1
800 TABLET, FILM COATED ORAL EVERY 6 HOURS PRN
Status: DISCONTINUED | OUTPATIENT
Start: 2023-05-30 | End: 2023-06-01 | Stop reason: HOSPADM

## 2023-05-30 RX ORDER — KETOROLAC TROMETHAMINE 30 MG/ML
30 INJECTION, SOLUTION INTRAMUSCULAR; INTRAVENOUS
Status: DISCONTINUED | OUTPATIENT
Start: 2023-05-30 | End: 2023-06-01 | Stop reason: HOSPADM

## 2023-05-30 RX ORDER — CARBOPROST TROMETHAMINE 250 UG/ML
250 INJECTION, SOLUTION INTRAMUSCULAR
Status: DISCONTINUED | OUTPATIENT
Start: 2023-05-30 | End: 2023-05-30 | Stop reason: HOSPADM

## 2023-05-30 RX ORDER — OXYTOCIN/0.9 % SODIUM CHLORIDE 30/500 ML
340 PLASTIC BAG, INJECTION (ML) INTRAVENOUS CONTINUOUS PRN
Status: DISCONTINUED | OUTPATIENT
Start: 2023-05-30 | End: 2023-06-01 | Stop reason: HOSPADM

## 2023-05-30 RX ORDER — ASCORBIC ACID 100 MG
TABLET,CHEWABLE ORAL
COMMUNITY

## 2023-05-30 RX ORDER — OXYTOCIN/0.9 % SODIUM CHLORIDE 30/500 ML
100-340 PLASTIC BAG, INJECTION (ML) INTRAVENOUS CONTINUOUS PRN
Status: DISCONTINUED | OUTPATIENT
Start: 2023-05-30 | End: 2023-06-01 | Stop reason: HOSPADM

## 2023-05-30 RX ORDER — METOCLOPRAMIDE HYDROCHLORIDE 5 MG/ML
10 INJECTION INTRAMUSCULAR; INTRAVENOUS EVERY 6 HOURS PRN
Status: DISCONTINUED | OUTPATIENT
Start: 2023-05-30 | End: 2023-05-30 | Stop reason: HOSPADM

## 2023-05-30 RX ORDER — TRANEXAMIC ACID 10 MG/ML
1 INJECTION, SOLUTION INTRAVENOUS EVERY 30 MIN PRN
Status: DISCONTINUED | OUTPATIENT
Start: 2023-05-30 | End: 2023-06-01 | Stop reason: HOSPADM

## 2023-05-30 RX ORDER — SODIUM CHLORIDE, SODIUM LACTATE, POTASSIUM CHLORIDE, CALCIUM CHLORIDE 600; 310; 30; 20 MG/100ML; MG/100ML; MG/100ML; MG/100ML
INJECTION, SOLUTION INTRAVENOUS CONTINUOUS PRN
Status: DISCONTINUED | OUTPATIENT
Start: 2023-05-30 | End: 2023-05-30 | Stop reason: HOSPADM

## 2023-05-30 RX ORDER — FENTANYL/ROPIVACAINE/NS/PF 2MCG/ML-.1
PLASTIC BAG, INJECTION (ML) EPIDURAL
Status: DISCONTINUED | OUTPATIENT
Start: 2023-05-30 | End: 2023-05-30 | Stop reason: HOSPADM

## 2023-05-30 RX ORDER — FENTANYL CITRATE 50 UG/ML
50 INJECTION, SOLUTION INTRAMUSCULAR; INTRAVENOUS EVERY 30 MIN PRN
Status: DISCONTINUED | OUTPATIENT
Start: 2023-05-30 | End: 2023-05-30 | Stop reason: HOSPADM

## 2023-05-30 RX ORDER — DOCUSATE SODIUM 100 MG/1
100 CAPSULE, LIQUID FILLED ORAL DAILY
Status: DISCONTINUED | OUTPATIENT
Start: 2023-05-31 | End: 2023-06-01 | Stop reason: HOSPADM

## 2023-05-30 RX ORDER — MISOPROSTOL 200 UG/1
800 TABLET ORAL
Status: DISCONTINUED | OUTPATIENT
Start: 2023-05-30 | End: 2023-05-30 | Stop reason: HOSPADM

## 2023-05-30 RX ORDER — OXYTOCIN 10 [USP'U]/ML
10 INJECTION, SOLUTION INTRAMUSCULAR; INTRAVENOUS
Status: DISCONTINUED | OUTPATIENT
Start: 2023-05-30 | End: 2023-05-30 | Stop reason: HOSPADM

## 2023-05-30 RX ORDER — METHYLERGONOVINE MALEATE 0.2 MG/ML
200 INJECTION INTRAVENOUS
Status: DISCONTINUED | OUTPATIENT
Start: 2023-05-30 | End: 2023-05-30 | Stop reason: HOSPADM

## 2023-05-30 RX ORDER — ASPIRIN 81 MG/1
81 TABLET, CHEWABLE ORAL DAILY
Status: ON HOLD | COMMUNITY
End: 2023-05-30

## 2023-05-30 RX ORDER — METHYLERGONOVINE MALEATE 0.2 MG/ML
200 INJECTION INTRAVENOUS
Status: DISCONTINUED | OUTPATIENT
Start: 2023-05-30 | End: 2023-06-01 | Stop reason: HOSPADM

## 2023-05-30 RX ORDER — OXYTOCIN/0.9 % SODIUM CHLORIDE 30/500 ML
1-24 PLASTIC BAG, INJECTION (ML) INTRAVENOUS CONTINUOUS
Status: DISCONTINUED | OUTPATIENT
Start: 2023-05-30 | End: 2023-05-30 | Stop reason: HOSPADM

## 2023-05-30 RX ADMIN — KETOROLAC TROMETHAMINE 30 MG: 30 INJECTION, SOLUTION INTRAMUSCULAR; INTRAVENOUS at 21:46

## 2023-05-30 RX ADMIN — SODIUM CHLORIDE, POTASSIUM CHLORIDE, SODIUM LACTATE AND CALCIUM CHLORIDE: 600; 310; 30; 20 INJECTION, SOLUTION INTRAVENOUS at 17:44

## 2023-05-30 RX ADMIN — Medication 2 MILLI-UNITS/MIN: at 11:15

## 2023-05-30 RX ADMIN — METOCLOPRAMIDE HYDROCHLORIDE 10 MG: 5 INJECTION INTRAMUSCULAR; INTRAVENOUS at 17:11

## 2023-05-30 RX ADMIN — SODIUM CHLORIDE, POTASSIUM CHLORIDE, SODIUM LACTATE AND CALCIUM CHLORIDE 1000 ML: 600; 310; 30; 20 INJECTION, SOLUTION INTRAVENOUS at 16:45

## 2023-05-30 RX ADMIN — ONDANSETRON 4 MG: 2 INJECTION INTRAMUSCULAR; INTRAVENOUS at 18:52

## 2023-05-30 RX ADMIN — Medication: at 17:47

## 2023-05-30 RX ADMIN — SODIUM CHLORIDE, POTASSIUM CHLORIDE, SODIUM LACTATE AND CALCIUM CHLORIDE: 600; 310; 30; 20 INJECTION, SOLUTION INTRAVENOUS at 11:14

## 2023-05-30 ASSESSMENT — ACTIVITIES OF DAILY LIVING (ADL)
ADLS_ACUITY_SCORE: 18
ADLS_ACUITY_SCORE: 18
ADLS_ACUITY_SCORE: 35
ADLS_ACUITY_SCORE: 18

## 2023-05-30 ASSESSMENT — LIFESTYLE VARIABLES: TOBACCO_USE: 0

## 2023-05-30 NOTE — TELEPHONE ENCOUNTER
Induction at 9am   Thinks her water broke      OB Triage Call      Is patient's OB/Midwife with the [facility] or [facility]? [facility]- Proceed with triage     Reason for call: Pt is calling to report that she is having some clear vaginal fluid leakage since 3am today and is wondering if she should go to [facility] now rather than wait for her 9am induction appt that is scheduled for today.     Assessment:   Pt reports that she has been having contractions on and off for the past week. Right now she reports her contractions are weak and about twenty minutes apart.   Early this morning at 3am she noticed that she started having slow leak of clear fluids since 3am    No other sx reported.     Plan: Pt is going to go to Labor and Delivery at [facility] now per protocol    Patient plans to deliver at [facility]    Patient's primary OB Provider is Dr. Iraida Paz      Per protocol recommendations Patient to be evaluated in L&D. Patient's primary OB is [facility].  Labor and delivery at [facility] ([phone]) notified of patient's pending arrival.  [facility]      Is patient's delivering hospital on divert? No      41w1d    Estimated Date of Delivery: May 22, 2023        OB History    Para Term  AB Living   3 2 2 0 0 2   SAB IAB Ectopic Multiple Live Births   0 0 0 0 2      # Outcome Date GA Lbr Maycol/2nd Weight Sex Delivery Anes PTL Lv   3 Current            2 Term 05/15/19 39w0d  3.5 kg (7 lb 11.5 oz) M CS-LTranv   BELLA      Name: Michi      Apgar1: 8  Apgar5: 9   1 Term /16 40w1d / 00:23 3.771 kg (8 lb 5 oz) M Vag-Spont EPI N BELLA      Birth Comments: IOL for GHTN.  Pitocin      Complications: Preeclampsia/Hypertension      Name: Jerrell      Obstetric Comments   No PPH, no GDM. +Gestational Hypertension with IOL - no Magnesium Sulfate       Lab Results   Component Value Date    GBS Negative 2019       "      Reason for Disposition    Leakage of fluid from vagina and leakage started < 4 hours ago    Additional Information    Negative: Passed out (i.e., fainted, collapsed and was not responding)    Negative: Shock suspected (e.g., cold/pale/clammy skin, too weak to stand, low BP, rapid pulse)    Negative: Difficult to awaken or acting confused (e.g., disoriented, slurred speech)    Negative: SEVERE constant abdominal pain (e.g., excruciating) and present > 1 hour    Negative: SEVERE bleeding (e.g., continuous red blood from vagina, or large blood clots)    Negative: Umbilical cord hanging out of the vagina (shiny, white, curled appearance, \"like telephone cord\")    Negative: Uncontrollable urge to push (i.e., feels like baby is coming out now)    Negative: Can see baby    Negative: Sounds like a life-threatening emergency to the triager    Negative: Pregnant < 37 weeks (i.e., )    Negative: Uncertain delivery date and possibly pregnant < 37 weeks (i.e., )    Negative: MODERATE-SEVERE abdominal pain    Negative: First baby (primipara) with contractions < 6 minutes apart, and present 2 hours    Negative: History of prior delivery (multipara) with contractions < 10 minutes apart, and present 1 hour    Negative: History of rapid prior delivery with contractions < 10 minutes apart    Negative: Leakage of fluid from vagina and green or brown in color    Negative: Leakage of fluid from vagina and leakage started > 4 hours ago    Negative: Vaginal bleeding or spotting  (Exception: Brief spotting after intercourse or pelvic exam.)    Negative: Baby moving less today (e.g., kick count < 5 in 1 hour or < 10 in 2 hours) and 23 or more weeks pregnant    Negative: Severe headache or headache that won't go away    Negative: New blurred vision or vision changes    Negative: Constant abdominal pain lasting > 2 hours    Negative: Fever > 100.4 F (38.0 C)    Protocols used: PREGNANCY - LABOR-A-OH    KIMANI Menjivar " [facility] Nurse Advisor 7:19 AM 5/30/2023

## 2023-05-30 NOTE — PROGRESS NOTES
[facility] PROGRESS NOTE:   May 30, 2023 2:38 PM         SUBJECTIVE:   Patient reports contractions are getting stronger, but she's still managing ok.           OBJECTIVE:     Vitals:    23 0900 23 1255   BP: 124/85 126/87   BP Location: Left arm Right arm   Patient Position: Semi-Walters's Semi-Walters's   Cuff Size: Adult Regular Adult Regular   Temp: 98.6  F (37  C) 98  F (36.7  C)   TempSrc: Oral Oral         NST:  reactive  FHT:  135/mod/+ accels, no decels  Cat:   1  [place]:  q 2-6 min  SVE:  4.5/80/-1             ASSESSMENT / PLAN:   33 year old  at 41w1d admitted for post-dates IOL.    Complications:  Previous :  S/p TOLAC consult.  Discussed risks of uterine rupture, which is slightly increased when using pitocin, but this is still considered a reasonable and safe means of IOL in the setting of TOLAC.    Labor: cont pitocin, titrating as appropriate.  Consider AROM after next check around 6pm  Fetal well being: Category 1 tracing.  GBS: neg  Pain: desires epidural as appropriate.    Liz Carbajal MD

## 2023-05-30 NOTE — ANESTHESIA PREPROCEDURE EVALUATION
Anesthesia Pre-Procedure Evaluation    Patient: Lindsey Giles   MRN: [id number] : 1990        Procedure :           Past Medical History:   Diagnosis Date     History of depression     Situational in high school - Lexapro. No SI or hospitalizations     History of gestational hypertension 2017     Varicella       Past Surgical History:   Procedure Laterality Date      SECTION N/A 05/15/2019    Procedure: Primary  Section;  Surgeon: Liz Hicks MD;  Location: [facility] L+D      No Known Allergies   Social History     Tobacco Use     Smoking status: Never     Smokeless tobacco: Never   Vaping Use     Vaping status: Never Used   Substance Use Topics     Alcohol use: Not Currently     Comment: 3-4 per week      Wt Readings from Last 1 Encounters:   23 80.6 kg (177 lb 11.2 oz)        Anesthesia Evaluation   Pt has had prior anesthetic. Type: OB Labor Epidural.        ROS/MED HX  ENT/Pulmonary: Comment: Seasonal allergies   (-) tobacco use   Neurologic:  - neg neurologic ROS     Cardiovascular: Comment: Hx gHTN in previous pregnancy - neg cardiovascular ROS     METS/Exercise Tolerance:     Hematologic: Comments: Gestational thrombocytopenia (Plts 135 5/30)      Musculoskeletal:  - neg musculoskeletal ROS     GI/Hepatic:  - neg GI/hepatic ROS     Renal/Genitourinary:  - neg Renal ROS     Endo:  - neg endo ROS     Psychiatric/Substance Use:     (+) psychiatric history depression     Infectious Disease:  - neg infectious disease ROS     Malignancy:  - neg malignancy ROS     Other: Comment: Gestational thrombocytopenia   History of gestational hypertension      IOL for post-dates   (+) , previous , TOLAC candidate,        Physical Exam    Airway        Mallampati: II   TM distance: > 3 FB   Neck ROM: full   Mouth opening: > 3 cm    Respiratory Devices and Support         Dental  no notable dental history         Cardiovascular   cardiovascular exam normal          Pulmonary   pulmonary  Yayo Carver is a 50 y.o. female here for a diabetic eye screening with non-dilated fundus photos per [employer].    Patient cooperative?: Yes  Small pupils?: No  Last eye exam: ONE YEAR AGO    WEAR READING GLASSES     For exam results, see Encounter Report.     exam normal                OUTSIDE LABS:  CBC:   Lab Results   Component Value Date    WBC 9.0 03/31/2023    WBC 9.1 02/20/2023    HGB 12.5 05/30/2023    HGB 12.0 04/28/2023    HCT 33.2 (L) 03/31/2023    HCT 32.0 (L) 02/20/2023     (L) 05/30/2023     03/31/2023     BMP:   Lab Results   Component Value Date     07/17/2019     10/15/2018    POTASSIUM 4.5 07/17/2019    POTASSIUM 4.1 10/15/2018    CHLORIDE 107 07/17/2019    CHLORIDE 108 10/15/2018    CO2 24 07/17/2019    CO2 25 10/15/2018    BUN 16 07/17/2019    BUN 8 10/15/2018    CR 0.50 (L) 11/11/2022    CR 0.57 07/17/2019    GLC 84 07/17/2019    GLC 94 10/15/2018     COAGS: No results found for: PTT, INR, FIBR  POC:   Lab Results   Component Value Date    HCG Negative 07/17/2019     HEPATIC:   Lab Results   Component Value Date    ALBUMIN 4.1 07/17/2019    ALT 9 (L) 11/11/2022    AST 13 11/11/2022     OTHER:   Lab Results   Component Value Date    BALTAZAR 9.0 07/17/2019    PHOS 3.7 07/17/2019    TSH 1.20 02/24/2022       Anesthesia Plan    ASA Status:  3      Anesthesia Type: Epidural.              Consents    Anesthesia Plan(s) and associated risks, benefits, and realistic alternatives discussed. Questions answered and patient/representative(s) expressed understanding.    - Discussed:     - Discussed with:  Patient         Postoperative Care            Comments:                Carlos Graham MD

## 2023-05-30 NOTE — ANESTHESIA PROCEDURE NOTES
"Epidural catheter Procedure Note    Pre-Procedure   Staff -        Anesthesiologist:  Addis Sainz MD       Resident/Fellow: Ayaan Graham MD       Performed By: resident       Location: OB       Pre-Anesthestic Checklist: patient identified, IV checked, risks and benefits discussed, informed consent, monitors and equipment checked, pre-op evaluation, at physician/surgeon's request and post-op pain management  Timeout:       Correct Patient: Yes        Correct Procedure: Yes        Correct Site: Yes        Correct Position: Yes   Procedure Documentation  Procedure: epidural catheter       Patient Position: sitting       Patient Prep/Sterile Barriers: sterile gloves, mask, patient draped       Skin prep: Chloraprep       Local skin infiltrated with 3 mL of 1% lidocaine.        Insertion Site: L3-4. (midline approach).       Technique: LORT saline        NORY at 3.5 cm.       Needle Type: LIFE INTERACTIONy needle       Needle Gauge: 17.        Needle Length (Inches): 3.5        Catheter: 19 G.          Catheter threaded easily.         5.5 cm epidural space.         Threaded 9 cm at skin.         # of attempts: 1 and  # of redirects:  0    Assessment/Narrative         Paresthesias: No.       Test dose of 3 mL lidocaine 1.5% w/ 1:200,000 epinephrine at 17:28 CDT.         Test dose negative, 3 minutes after injection, for signs of intravascular, subdural, or intrathecal injection.       Insertion/Infusion Method: LORT saline       Aspiration negative for Heme or CSF via Epidural Catheter.      FOR [facility] ([facility]) ONLY:   Pain Team Contact information: please page the Pain Team Via [employer]. Search \"Pain\". During daytime hours, please page the attending first. At night please page the resident first.      "

## 2023-05-30 NOTE — PROGRESS NOTES
[facility]  Labor Progress Note    S:  Patient just got epidural, waiting for the pain relief to start. Otherwise doing well, no acute complaints. No increased pressure below.    O:   Patient Vitals for the past 4 hrs:   BP Temp Temp src   23 1525 128/72 98.2  F (36.8  C) Oral     SVE: 4.5/80/-1 per 1445 check    FHT: Baseline 135, moderate variability, accelerations present. Potential variable decelerations at 1751 and 1754 , though tracing broken in segments, spontaneous return to baseline. Overall reassuring tracing. Potential variable decel at 1314.  [place]: 3 contractions in 10 minutes    A/P:  Ms. Lindsey Giles is a 33 year old  at 41w1d, here for IOL in the setting of TOLAC.    Labor:  - Cervix: 4.5/80%/-2 at 1445  - Plan: Continue augmentation with pitocin, currently at 6 mark-units/min. AROM PRN.   - Pain: Epidural  - GBS negative  - PPH: standard meds    # gTCP  - Platelets 154 3/31, recheck today 135  - Repeat CBC prn      # H/o gHTN  - Baseline HELLP labs wnl, recent diagnosis of gTCP as above  - Continue serial BP monitoring    FWB:  - Category 2 FHT, but overall reassuring with good variability and accels  - EFW 75%ile    Janna Rea, MS3    I saw and evaluated the patient with Janna Rea, MS3. I have reviewed and edited the documentation above and agree with the assessment and plan as stated.    Ivette Platt MD  ObGyn Resident, PGY-1  2023 5:39 PM

## 2023-05-30 NOTE — H&P
History and Physical     Lindsey Giles MRN# [id number]   YOB: 1990 Age: 33 year old      Date of Admission: 2023    Primary care provider: Luli Llanes      Assessment and Plan:       33 year old  at 41w1d by LMP c/w 10w1d US who was scheduled for IOL TOLAC for late term pregnancy. Pregnancy is notable for h/o prior CS x1 (breech), h/o gHTN, placenta previa (resolved).     # IOL/TOLAC  - Cervix: 3/90/-2 on exam this AM   - Some leaking today around 0300, scant. ROM+ neg and bag palpated.   - Augmentation: pitocin per protocol  - Membranes: suspect intact  - Pain: reviewed options, would like epidural  - PPH: no contraindications to uterotonics   - Previously has reviewed risk of TOLAC and consent signed, briefly reviewed again today. Consented for CS delivery and let her know that at any point if she would like to proceed with a CS that is an option for her. Risks including bleeding, infection, injury, need for blood transfusion, remote risk of need for life-saving hysterectomy reviewed.     # gTCP  - Platelets 154 3/31, recheck today 135  - Repeat CBC prn     # H/o gHTN  - Baseline HELLP labs wnl, recent diagnosis of gTCP as above  - Continue serial BP monitoring    # PNC  - Rh pos, Rubella immune, HepB/HIV/RPR NR, GC/CT NR, , GBS neg  - Other prenatal labs wnl  - Imaging: placenta anterior, previously previa (resolved), last growth US  EFW 75%, AC 80%  - S/p flu, Tdap vaccines     # FWB:   Cat I tracing, reactive and reassuring; Cephalic by BSUS; EFW 7.5#  - Continuous Fetal Monitoring  - Intrauterine resuscitative measures prn      Patient discussed with Dr. Carbajal.     Olivia Duque MD  OB/GYN Resident PGY-3  12:26 PM May 30, 2023            HPI:     Lindsey Giles is a 33 year old  at 41w1d by LMP c/w 10w1d US who presents today for IOL for late term pregnancy. Thought that maybe her water broke this AM around 3, has had a small amount of  leaking since that time but no gushes. No VB. Good FM. Otherwise feeling well without any complaints this AM.    First delivery was  after IOL for several days. Second delivery CS for breech presentation. Does have a h/o gHTN but BP has been normal this pregnancy. No HA, vision changes, CP, SOB, RUQ pain, or significant edema. Denies any other symptoms.     Planning TOLAC, has had counseling and signed TOLAC consent in the clinic.      Pregnancy notable for:   - H/o prior CS x1  - H/o gHTN  - gTCP   - Placenta previa, resolved  - Fetal macrosomia, resolved     OB History:    OB History    Para Term  AB Living   3 2 2 0 0 2   SAB IAB Ectopic Multiple Live Births   0 0 0 0 2      # Outcome Date GA Lbr Maycol/2nd Weight Sex Delivery Anes PTL Lv   3 Current            2 Term 05/15/19 39w0d  3.5 kg (7 lb 11.5 oz) M CS-LTranv   BELLA      Name: Michi      Apgar1: 8  Apgar5: 9   1 Term 16 40w1d / 00:23 3.771 kg (8 lb 5 oz) M Vag-Spont EPI N BELLA      Birth Comments: IOL for GHTN.  Pitocin      Complications: Preeclampsia/Hypertension      Name: Jerrell      Obstetric Comments   No PPH, no GDM. +Gestational Hypertension with IOL - no Magnesium Sulfate        Prenatal Lab Results:  ABO - A   Rh - pos  Ab - neg   Rubella - immune  HepB/HIV/RPR - NR  GC/CT - NR  GCT -135  GBS - neg             Past Medical History:     Past Medical History:   Diagnosis Date     History of depression     Situational in high school - Lexapro. No SI or hospitalizations     History of gestational hypertension 2017     Varicella    No h/o HTN or asthma           Past Surgical History:     Past Surgical History:   Procedure Laterality Date      SECTION N/A 05/15/2019    Procedure: Primary  Section;  Surgeon: Liz Hikcs MD;  Location:  L+D             Social History:     Social History     Tobacco Use     Smoking status: Never     Smokeless tobacco: Never   Vaping Use     Vaping status: Never Used    Substance Use Topics     Alcohol use: Not Currently     Comment: 3-4 per week             Family History:     Family History   Problem Relation Age of Onset     Musculoskeletal Disorder Mother      Multiple Sclerosis Mother 42        doing well,     Hypertension Mother      Fibroids Mother         hysterectomy     Thyroid Disease Mother         unsure of what- takes medication     Hyperlipidemia Mother      Diabetes Maternal Grandmother      Autism Spectrum Disorder Cousin      Autism Spectrum Disorder Other      Birth defects No family hx of      Breast Cancer No family hx of      Ovarian Cancer No family hx of      Clotting Disorder No family hx of              Immunizations:     Immunization History   Administered Date(s) Administered     COVID-19 Bivalent 12+ (Pfizer) 09/17/2022     COVID-19 Monovalent 18+ (Moderna) 04/13/2021, 05/11/2021, 11/19/2021     Flu, Unspecified 09/11/2018     Hepatits B (Peds <19Y) 07/22/1997, 10/10/1997, 04/30/1998     Influenza Vaccine >6 months (Alfuria,Fluzone) 10/26/2015, 09/23/2016, 01/29/2018, 09/11/2018, 11/02/2019, 09/28/2020, 10/02/2021, 10/22/2022     Influenza Vaccine, 6+MO IM (QUADRIVALENT W/PRESERVATIVES) 09/11/2018     Influenza,INJ,MDCK,PF,Quad >6mo(Flucelvax) 01/29/2018     MMR 08/08/1989, 08/03/1994     TDAP (Adacel,Boostrix) 02/20/2023     TDAP Vaccine (Boostrix) 09/23/2016, 02/20/2019            Allergies:   No Known Allergies          Medications:     Medications Prior to Admission   Medication Sig Dispense Refill Last Dose     acetaminophen (TYLENOL) 325 MG tablet Take 2 tablets (650 mg) by mouth every 6 hours as needed for mild pain Start after Delivery.        ibuprofen (ADVIL/MOTRIN) 200 MG tablet Take 3 tablets (600 mg) by mouth every 6 hours as needed for moderate pain Start after delivery        Prenatal Vit-Fe Fumarate-FA (PRENATAL MULTIVITAMIN W/IRON) 27-0.8 MG tablet Take 1 tablet by mouth daily        senna-docusate (SENOKOT-S/PERICOLACE) 8.6-50 MG  tablet Take 1 tablet by mouth daily for 30 days Start after delivery.                Review of Systems & Physical Exam:     The Review of Systems is negative other than noted in the HPI      /85 (BP Location: Left arm, Patient Position: Semi-Walters's, Cuff Size: Adult Regular)   Temp 98.6  F (37  C) (Oral)   LMP 08/15/2022   Gen: Well appearing, NAD  CV: Warm and well perfused   Lungs: Breathing comfortably on room air   Abd: Gravid, non-tender, non-distended  Ext: Trace peripheral extremity edema    Cervix: 3/90/-2, bag palpates intact  Membranes: Intact  Presentation: Cephalic by BSUS  Estimated Fetal Weight: 7.5# by Leopold's    FHT:  Monitoring External  FHT: Baseline 140 bpm; moderate variability; accels present; no decelerations  TOCO 0 contractions in 10 minutes, irritability         Data:      Latest Reference Range & Units 05/30/23 09:04   Hemoglobin 11.7 - 15.7 g/dL 12.5   Platelet Count 150 - 450 10e3/uL 135 (L)   ABO/Rh(D)  A POS   Antibody Screen Negative  Negative   (L): Data is abnormally low    ROM+ negative              Spine appears normal, range of motion is not limited, no muscle or joint tenderness

## 2023-05-30 NOTE — PROGRESS NOTES
"[facility]  Labor Progress Note    S:  Patient is doing well. Managing labor on labor ball. Feeling contractions but not too uncomfortable. No HA, chest pain, SOB, fever, MSK pain or swelling. No history of asthma or diabetes. Reports history of \"one high blood pressure reading\" in previous pregnancy but no issues with HTN since. No spotting and no discharge. Regular fetal movement.   Has mild lower extremity edema.    O:   Patient Vitals for the past 4 hrs:   BP Temp Temp src   23 1255 126/87 98  F (36.7  C) Oral     SVE: 3/80/-2 at 0931 check    FHT: Baseline 135, minimal to moderate variability,  accelerations,  Intermittent subtle late decelerations with spontaneous return to baseline, overall reassuring tracing. Potential variable decel at 1314.  [place]: 3 contractions in 10 minutes    A/P:  Ms. Lindsey Giles is a 33 year old  at 41w1d, here for IOL in the setting of TOLAC.    Labor:  - Cervix: 3/80%/-2 at 0931  - Plan: Continue augmentation with pitocin, currently at 6 mark-units/min. AROM PRN.   - Pain: Epidural  - GBS negative  - PPH: standard meds    # gTCP  - Platelets 154 3/31, recheck today 135  - Repeat CBC prn      # H/o gHTN  - Baseline HELLP labs wnl, recent diagnosis of gTCP as above  - Continue serial BP monitoring    FWB:  - Category 2 FHT, but overall reassuring with good variability and accels  - EFW 75%ile    Janna Rea MS3    I saw and evaluated the patient with Janna Rea MS3. I have reviewed and edited the documentation above and agree with the assessment and plan as stated.    Ivette Platt MD  ObGyn Resident, PGY-1  2023 1:23 PM       "

## 2023-05-31 LAB
ERYTHROCYTE [DISTWIDTH] IN BLOOD BY AUTOMATED COUNT: 12.2 % (ref 10–15)
HCT VFR BLD AUTO: 32.2 % (ref 35–47)
HGB BLD-MCNC: 11 G/DL (ref 11.7–15.7)
MCH RBC QN AUTO: 31.7 PG (ref 26.5–33)
MCHC RBC AUTO-ENTMCNC: 34.2 G/DL (ref 31.5–36.5)
MCV RBC AUTO: 93 FL (ref 78–100)
PLATELET # BLD AUTO: 126 10E3/UL (ref 150–450)
RBC # BLD AUTO: 3.47 10E6/UL (ref 3.8–5.2)
WBC # BLD AUTO: 11.1 10E3/UL (ref 4–11)

## 2023-05-31 PROCEDURE — 120N000002 HC R&B MED SURG/OB UMMC

## 2023-05-31 PROCEDURE — 85027 COMPLETE CBC AUTOMATED: CPT

## 2023-05-31 PROCEDURE — 250N000013 HC RX MED GY IP 250 OP 250 PS 637

## 2023-05-31 PROCEDURE — 36415 COLL VENOUS BLD VENIPUNCTURE: CPT

## 2023-05-31 RX ORDER — IBUPROFEN 600 MG/1
600 TABLET, FILM COATED ORAL EVERY 6 HOURS PRN
Qty: 60 TABLET | Refills: 0 | Status: SHIPPED | OUTPATIENT
Start: 2023-05-31 | End: 2023-08-08

## 2023-05-31 RX ORDER — AMOXICILLIN 250 MG
1 CAPSULE ORAL DAILY
Qty: 60 TABLET | Refills: 0 | Status: SHIPPED | OUTPATIENT
Start: 2023-05-31 | End: 2023-08-08

## 2023-05-31 RX ORDER — ACETAMINOPHEN 325 MG/1
650 TABLET ORAL EVERY 6 HOURS PRN
Qty: 100 TABLET | Refills: 0 | Status: SHIPPED | OUTPATIENT
Start: 2023-05-31 | End: 2023-08-08

## 2023-05-31 RX ADMIN — ACETAMINOPHEN 650 MG: 325 TABLET, FILM COATED ORAL at 08:17

## 2023-05-31 RX ADMIN — IBUPROFEN 800 MG: 800 TABLET, FILM COATED ORAL at 10:46

## 2023-05-31 RX ADMIN — DOCUSATE SODIUM 100 MG: 100 CAPSULE, LIQUID FILLED ORAL at 08:17

## 2023-05-31 RX ADMIN — IBUPROFEN 800 MG: 800 TABLET, FILM COATED ORAL at 03:44

## 2023-05-31 RX ADMIN — ACETAMINOPHEN 650 MG: 325 TABLET, FILM COATED ORAL at 16:18

## 2023-05-31 RX ADMIN — IBUPROFEN 800 MG: 800 TABLET, FILM COATED ORAL at 22:29

## 2023-05-31 RX ADMIN — ACETAMINOPHEN 650 MG: 325 TABLET, FILM COATED ORAL at 20:12

## 2023-05-31 RX ADMIN — ACETAMINOPHEN 650 MG: 325 TABLET, FILM COATED ORAL at 12:26

## 2023-05-31 RX ADMIN — ACETAMINOPHEN 650 MG: 325 TABLET, FILM COATED ORAL at 00:10

## 2023-05-31 RX ADMIN — ACETAMINOPHEN 650 MG: 325 TABLET, FILM COATED ORAL at 03:45

## 2023-05-31 RX ADMIN — IBUPROFEN 800 MG: 800 TABLET, FILM COATED ORAL at 16:18

## 2023-05-31 ASSESSMENT — ACTIVITIES OF DAILY LIVING (ADL)
ADLS_ACUITY_SCORE: 18

## 2023-05-31 NOTE — PLAN OF CARE
Goal Outcome Evaluation:      Plan of Care Reviewed With: patient    Overall Patient Progress: improvingOverall Patient Progress: improving    Outcome Evaluation: VSS. Uterine cramping and perineal pain well controlled with Tylenol and Ibuprofen. Pt breastfeeding infant on cue, good latch noted. Pt tolerating regular diet, ambulating in room and voiding without difficulty. Postpartum checks WNL. Spouse, Bucky, present and attentive at bedside. Pt's parents and two older kids visited this afternoon. Everyone bonding well with infant.

## 2023-05-31 NOTE — PROGRESS NOTES
Anesthesia Post-Partum Follow-Up Note After Vaginal Delivery with Epidural    Patient: Lindsey Giles    Patient location: Post-partum floor    Anesthesia type: Epidural    Subjective  Lindsey Giles does not complain of pruritis at this time. She denies weakness, denies paresthesia, denies difficulties breathing or voiding, denies nausea or vomiting, and denies headache. She is able to ambulate and tolerates regular diet. Patient endorsed an overall positive anesthesia experience.    Objective  Respiratory Function (RR / SpO2 / Airway Patency): Satisfactory  Cardiac Function (HR / Rhythm / BP): Satisfactory  Strength and sensation lower extremities: Normal  Site of epidural insertion: No signs of infection or inflammation    Most recent vitals  BP 97/55 (BP Location: Right arm, Patient Position: Supine, Cuff Size: Adult Regular)   Pulse 63   Temp 37  C (98.6  F) (Oral)   Resp 16   LMP 08/15/2022   SpO2 97%   Breastfeeding Unknown     Assessment and plan  Lindsey Giles is a 33 year old female  post-partum #1 s/p vaginal delivery. An epidural catheter was successfully inserted  and provided labor analgesia via programmed intermittent bolus pump infusing 0.1% ropivacaine and 2 mcg/ml fentanyl, in addition to patient-controlled epidural analgesia and physician hand boluses as needed. The patient delivered via  and the epidural catheter was removed immediately thereafter by the L&D RN.     At this time, there is no evidence of adverse side effects associated with the insertion or removal of the epidural catheter. If the patient develops new lower extremity paresis or paresthesias, or if there are concerns regarding the insertion site of the catheter, please reach out to the anesthesia department OB division [phone]    Thank you for including us in the care for this patient. Anesthesia is signing off at this time.     Carlos Graham MD  Anesthesiology Resident, CA-2  May 31, 2023

## 2023-05-31 NOTE — CARE PLAN
Data: Lindsey Giles transferred to [place] [id number] via wheelchair at 0000. Baby transferred via parent's arms.  Action: Receiving unit notified of transfer: Yes. Patient and family notified of room change. Report given to Fatoumata HARMAN at 0000. Updated bedside report, patient up to bathroom and attempted to void, unable to void. Belongings sent to receiving unit. Accompanied by Registered Nurse. Oriented patient to surroundings. Call light within reach. ID bands double-checked with receiving RN.   Response: Patient tolerated transfer and is stable.

## 2023-05-31 NOTE — PROGRESS NOTES
Patient arrived to [facility] unit via wheelchair at 0000,with belongings, accompanied by spouse/ significant other, with infant in arms. Received report from KIMANI Davila and checked bands. Unit and room orientation completd. Call light given; no concerns present at this time. Continue with plan of care.

## 2023-05-31 NOTE — PROGRESS NOTES
[facility] DELIV[facility] PROGRESS NOTE:   May 30, 2023 6:30PM         SUBJECTIVE:   Comfortable with epidural.  Feeling intermittent pressure.           OBJECTIVE:     Vitals:    23 1755 23 1800 23 1835 23 1905   BP: 131/74 122/70 134/74 116/56   BP Location: Right arm Right arm Right arm Right arm   Patient Position: Semi-Walters's Semi-Walters's Semi-Walters's Semi-Walters's   Cuff Size: Adult Regular Adult Regular Adult Regular Adult Regular   Resp:  18  18   Temp:  98.6  F (37  C)     TempSrc:       SpO2: 98% 98% 98% 95%         NST:  reactive  FHT:  120/mod/- accels, occasional early decels  Cat:   1  [place]:  q 2 min  SVE:  Complete, 0 station, CHELITA             ASSESSMENT / PLAN:   33 year old  at 41w1d admitted for post-dates IOL.    Complications:  Previous :  S/p TOLAC consult.  Discussed risks of uterine rupture, which is slightly increased when using pitocin, but this is still considered a reasonable and safe means of IOL in the setting of TOLAC.    Labor: s/p AROM.  Cont pitocin, titrating as appropriate. Start pushing.  Fetal well being: Category 1 tracing.  GBS: neg  Pain: epidural working well    Geovani Paz MD, MPH  Ob/Gyn Resident, PGY-2  23 8:51 PM

## 2023-05-31 NOTE — PLAN OF CARE
Data: Patient admitted to room 477 at 0841. Patient is a . Prenatal record reviewed.   OB History    Para Term  AB Living   3 2 2 0 0 2   SAB IAB Ectopic Multiple Live Births   0 0 0 0 2      # Outcome Date GA Lbr Maycol/2nd Weight Sex Delivery Anes PTL Lv   3 Current            2 Term 05/15/19 39w0d  3.5 kg (7 lb 11.5 oz) M CS-LTranv   BELLA      Name: Michi      Apgar1: 8  Apgar5: 9   1 Term 16 40w1d / 00:23 3.771 kg (8 lb 5 oz) M Vag-Spont EPI N BELLA      Birth Comments: IOL for GHTN.  Pitocin      Complications: Preeclampsia/Hypertension      Name: Jerrell      Obstetric Comments   No PPH, no GDM. +Gestational Hypertension with IOL - no Magnesium Sulfate   .  Medical History:   Past Medical History:   Diagnosis Date     History of depression     Situational in high school - Lexapro. No SI or hospitalizations     History of gestational hypertension 2017     Varicella    .  Gestational age 41w1d. Vital signs per doc flowsheet. Fetal movement present. Patient reports Induction Of Labor   as reason for admission. Support persons Bucky present.  Action: Verbal consent for EFM, external fetal monitors applied. Admission assessment completed. Patient and support persons educated on labor process. Patient instructed to report change in fetal movement, contractions, vaginal leaking of fluid or bleeding, abdominal pain, or any concerns related to the pregnancy to her nurse/physician. Patient oriented to room, call light in reach.   Response: DONNA Duque and Dr Carbajal informed of arrival.  Plan per provider is start IV oxytocin for IOL. Patient verbalized understanding of education and verbalized agreement with plan. Patient coping with labor.

## 2023-05-31 NOTE — PROGRESS NOTES
[facility]   Post-partum Note    Name:  Lindsey Giles  MRN: [id number]    S: Patient is feeling well overall, just tired.  Pain somewhat controlled.  Tolerating regular diet without nausea or vomiting.  Ambulating without dizziness.  Lochia appropriate.  Working on breast feeding.    O:   Patient Vitals for the past 24 hrs:   BP Temp Temp src Pulse Resp SpO2   05/31/23 0355 97/55 98.6  F (37  C) Oral 63 16 --   05/31/23 0004 131/79 98.6  F (37  C) Oral 68 18 97 %   05/30/23 2317 131/69 -- -- -- -- --   05/30/23 2316 -- -- -- -- -- 95 %   05/30/23 2311 -- -- -- -- -- 97 %   05/30/23 2307 122/64 98.8  F (37.1  C) Oral -- 18 95 %   05/30/23 2300 124/63 -- -- -- -- 95 %   05/30/23 2256 -- -- -- -- -- 96 %   05/30/23 2251 -- -- -- -- -- 95 %   05/30/23 2246 -- -- -- -- -- 95 %   05/30/23 2241 -- -- -- -- -- 96 %   05/30/23 2236 -- -- -- -- -- 96 %   05/30/23 2231 -- -- -- -- -- 95 %   05/30/23 2226 -- -- -- -- -- 97 %   05/30/23 2221 -- -- -- -- -- 97 %   05/30/23 2219 130/75 -- -- -- -- --   05/30/23 2216 -- -- -- -- -- 96 %   05/30/23 2211 -- -- -- -- -- 97 %   05/30/23 2206 -- -- -- -- -- 96 %   05/30/23 2200 122/70 -- -- -- -- 95 %   05/30/23 2156 -- -- -- -- -- 94 %   05/30/23 2154 -- -- -- -- -- 94 %   05/30/23 2151 -- -- -- -- -- 94 %   05/30/23 2149 126/67 -- -- -- -- --   05/30/23 2148 -- -- -- -- -- 94 %   05/30/23 2146 -- -- -- -- -- 95 %   05/30/23 2141 -- -- -- -- -- 94 %   05/30/23 2139 -- -- -- -- -- 94 %   05/30/23 2136 -- -- -- -- -- 97 %   05/30/23 2134 124/65 -- -- -- -- --   05/30/23 2131 -- -- -- -- -- 96 %   05/30/23 2126 -- -- -- -- -- 96 %   05/30/23 2121 -- -- -- -- -- 97 %   05/30/23 2119 128/72 -- -- -- -- --   05/30/23 2116 -- -- -- -- -- 97 %   05/30/23 2111 -- -- -- -- -- 97 %   05/30/23 2106 -- -- -- -- -- 98 %   05/30/23 2100 -- -- -- -- -- 97 %   05/30/23 2057 -- -- -- -- -- 94 %   05/30/23 2056 -- -- -- -- -- 98 %   05/30/23 2051 -- -- -- -- -- 98 %    23 -- -- -- -- -- 97 %   23 137/84 -- -- -- -- --   23 (!) 140/88 98.5  F (36.9  C) Oral -- 20 96 %   23 (!) 140/90 -- -- -- -- --   23 1905 116/56 98.4  F (36.9  C) Oral -- 18 95 %   23 190 -- -- -- -- -- 94 %   23 1835 134/74 -- -- -- -- 98 %   23 1800 122/70 98.6  F (37  C) Oral -- 18 98 %   23 175 131/74 -- -- -- -- 98 %   23 1753 121/62 -- -- -- -- --   23 1750 131/66 -- -- -- -- 98 %   23 1745 124/60 -- -- -- -- 98 %   23 1744 125/58 -- -- -- -- --   23 1742 124/64 -- -- -- -- --   23 1740 137/70 -- -- -- -- 97 %   23 1738 129/66 -- -- -- -- --   23 1735 125/61 -- -- -- -- 98 %   23 173 137/84 -- -- -- -- --   23 173 131/79 -- -- -- 20 --   23 173 (!) 144/88 -- -- -- 18 98 %   23 1725 136/80 98.6  F (37  C) -- -- 18 99 %   23 1525 128/72 98.2  F (36.8  C) Oral -- 18 --   23 1255 126/87 98  F (36.7  C) Oral -- 18 --   23 0900 124/85 98.6  F (37  C) Oral -- 16 --     Gen:  Resting comfortably, NAD  CV:  RR, well perfused  Pulm:  Breathing comfortably on room air  Abd:  Soft, appropriately ttp, non-distended. Fundus below umbilicus, firm and non-tender.  Ext:  non-tender, trace LE edema b/l    I/O last 3 completed shifts:  In: -   Out: 325 [Urine:125; Blood:200]    Hgb:   Hemoglobin   Date Value Ref Range Status   2023 12.5 11.7 - 15.7 g/dL Final   2019 13.4 11.7 - 15.7 g/dL Final       Assessment/Plan:  Lindsey J Tisha 33 year old  on PPD#1 s/p  yesterday evening. Doing well in early postpartum period.  Gestational thrombocytopenia  H/o gestational htn with few mild range BPs in labor.    #Postpartum management  Pain: Well-controlled with ibuprofen and tylenol.  Hgb: 12.5> > AM CBC pending.  Rh: Positive  Rubella: Immune  GI: PRN bowel regimen ordered  Feed: Breast  BC: Need to discuss    # gTCP  - Plt 135    -  Repeat CBC ordered for this AM    # H/o gHTN  # MR BP <4H  - BPs predominantly normotensive since delivery  - HELLP labs PRN      Dispo: Anticipate DC morning PPD#2.    Geovani Paz MD PGY2  Obstetrics & Gynecology  23 6:16 AM       Physician Attestation   I personally examined and evaluated this patient.  I discussed the patient with the resident/fellow and care team, and agree with the assessment and plan of care as documented in the note.     Key findings: 33 year old  on PPD 1 s/p . Overall doing well. Working on pain control. Monitoring BPs and platelets as above. Continue routine advancements and anticipate discharge to home tomorrow morning.     Please see A&P for additional details of medical decision making.        Liz Oleary MD  Date of Service (when I saw the patient): 23

## 2023-05-31 NOTE — PLAN OF CARE
Goal Outcome Evaluation:  VSS and postpartum assessments WDL.  Up ad sandra with steady gait and independent with cares. Voiding spontaneously. Bonding well with infant. Breastfeeding on cue every 2-3 hours.  Pain managed with Tylenol and Ibuprofen.   Bucky present and supportive.  Will continue with postpartum cares and education per plan of care.

## 2023-05-31 NOTE — DISCHARGE SUMMARY
VAGINAL DELIVERY DISCHARGE SUMMARY    Lindsey Giles  : 1990  MRN: [id number]    Admit date: 2023  Discharge date: 23     Admit Dx:   - 33 year old  at 41w1d  - H/o CS x1  - TOLAC  - gTCP  - H/o gHTN    Discharge Dx:  - Same as above, s/p     Procedures:  - Spontaneous vaginal delivery  - Epidural analgesia    Admit HPI:  Ms. Lindsey Giles is a  at 41w1d who was admitted for IOL for post dates in the setting of TOLAC on 2023. Please see her admit H&P for full details of her PMH, PSH, Meds, Allergies and exam on admit.    Consults:  Anesthesia  Lactation    Hospital course:  Lindsey Giles was admitted to the hospital on 2023 for the above listed indications.     Stage 1:  33 year old  presented at 41 weeks 1 days for post-dates IOL.  Concern for ROM on arrival, but ROM plus negative.  GBS negative.  She received pitocin and had AROM when appropriate.  Received an epidural that adequately managed her labor pain.     Stage 2:  Patient did not labor down. She pushed effectively. She delivered a viable male infant with Apgars 7/9 over a second degree laceration. Weight is pending.   Delayed cord clamping was performed. Cord was clamped and cut.     Stage 3:  Placenta delivered rapidly with active management, intact, three vessel cord.   second degree laceration was repaired with 3-0 vicryl in standard fashion.  EBL 200ml.  Sponge and needle counts correct.    Please see her Delivery Summary for full details regarding her delivery.    Her postpartum course was uncomplicated. On PPD#2, she was meeting all of her postpartum goals and deemed stable for discharge. She was voiding without difficulty, tolerating a regular diet without nausea and vomiting, her pain was well controlled on oral pain medicines and her lochia was appropriate. Her hemoglobin prior to delivery was 12.5 and after delivery was 11.0. Her Rh status was positive, and Rhogam was not indicated. She was  rubella immune and a vaccine was therefore not indicated.    HGB  Recent Labs   Lab 23  0859 23  0904   HGB 11.0* 12.5       Discharge Medications:     Review of your medicines      CONTINUE these medicines which may have CHANGED, or have new prescriptions. If we are uncertain of the size of tablets/capsules you have at home, strength may be listed as something that might have changed.      Dose / Directions   ibuprofen 600 MG tablet  Commonly known as: ADVIL/MOTRIN  This may have changed:     medication strength    additional instructions  Used for:  (spontaneous vaginal delivery)      Dose: 600 mg  Take 1 tablet (600 mg) by mouth every 6 hours as needed for moderate pain  Quantity: 60 tablet  Refills: 0        CONTINUE these medicines which have NOT CHANGED      Dose / Directions   acetaminophen 325 MG tablet  Commonly known as: TYLENOL  Used for: Encounter for supervision of other normal pregnancy in third trimester      Dose: 650 mg  Take 2 tablets (650 mg) by mouth every 6 hours as needed for mild pain Start after Delivery.  Quantity: 100 tablet  Refills: 0     ferrous gluconate 324 (38 Fe) MG tablet  Commonly known as: FERGON      Dose: 324 mg  Take 324 mg by mouth daily (with breakfast)  Refills: 0     prenatal multivitamin w/iron 27-0.8 MG tablet      Dose: 1 tablet  Take 1 tablet by mouth daily  Refills: 0     senna-docusate 8.6-50 MG tablet  Commonly known as: SENOKOT-S/PERICOLACE  Used for: Encounter for supervision of other normal pregnancy in third trimester      Dose: 1 tablet  Take 1 tablet by mouth daily Start after delivery.  Quantity: 60 tablet  Refills: 0     vitamin C 100 MG Chew      Refills: 0        STOP taking    aspirin 81 MG chewable tablet  Commonly known as: ASA              Where to get your medicines      These medications were sent to [facility] [place], [place] - [address]  [address] [address]    Phone: [phone]      acetaminophen 325 MG tablet    ibuprofen 600 MG tablet    senna-docusate 8.6-50 MG tablet         Discharge/Disposition:  Lindsey Giles was discharged to home in stable condition with the following instructions/medications:  1) Call for temperature > 100.4, bright red vaginal bleeding >1 pad an hour x 2 hours, foul smelling vaginal discharge, pain not controlled by usual oral pain meds, persistent nausea and vomiting not controlled on medications  2) Follow up at 6wk visit for contraception.  3) For feeding she decided to breast feed.  4) She was instructed to follow-up with her primary OB in 6 weeks for a routine postpartum visit.    Geovani Paz MD, MPH  Ob/Gyn Resident, PGY-2

## 2023-05-31 NOTE — PROGRESS NOTES
VSS. Patient resting in bed, comfortable. Pain managed effectively with epidural. Continuous EFM in place (see flow sheet), IV running LR @ 125 ml/hr and oxytocin for IOL (See MAR). Patient leaking clear amniotic fluid. Report given to Екатерина HARMAN.

## 2023-05-31 NOTE — PLAN OF CARE
of viable male with Raven [facility] and EJ Paz MD in attendance. Nursery RN Chidi and Sharon present. Infant with spontaneous cry to mothers abdomen, dried and stimulated. Apgars 7 & 9. Placenta delivered without complications, pitocin bolused, 2nd degree laceration, repairs done. Arianna cares provided. Mother and baby in stable condition.

## 2023-05-31 NOTE — L&D DELIVERY NOTE
Delivery Summary    Lindsey Giles MRN# [id number]   Age: 33 year old YOB: 1990     ASSESSMENT & PLAN:   Stage 1:  33 year old  presented at 41 weeks 1 days for post-dates IOL.  Concern for ROM on arrival, but ROM plus negative.  GBS negative.  She received pitocin and had AROM when appropriate.  Received an epidural that adequately managed her labor pain.    Stage 2:  Patient did not labor down. She pushed effectively. She delivered a viable male infant with Apgars 7/9 over a second degree laceration. Weight is pending.   Delayed cord clamping was performed. Cord was clamped and cut.    Stage 3:  Placenta delivered rapidly with active management, intact, three vessel cord.   second degree laceration was repaired with 3-0 vicryl in standard fashion.  EBL 200ml.  Sponge and needle counts correct.    Expected date of discharge: 23    Liz Carbajal MD         Tisha, Male-Lindsey [[id number]]    Rupture date/time: 23 1843   Rupture type: Artificial Rupture of Membranes  Fluid color: Clear  Fluid odor: Normal     Induction: AROM, Oxytocin  Induction date/time:      Cervical ripening date/time:      Indications for induction: Post-term Gestation, Advanced Maternal Age     Delivery/Placenta Date and Time    Delivery Date: 23 Delivery Time:  9:08 PM           Apgars    Living status: Living   1 Minute 5 Minute 10 Minute 15 Minute 20 Minute   Skin color: 1  1       Heart rate: 2  2       Reflex irritability: 2  2       Muscle tone: 1  2       Respiratory effort: 1  2       Total: 7  9       Apgars assigned by: JAYLYNN SANDIFER,RN     Cord    Cord Complications: Nuchal   Nuchal Intervention: delivered through   Nuchal cord description: tight nuchal cord   Cord Blood Disposition: Discard      Gases Sent?: Yes      Delayed cord clamping?: Yes      Cord Clamping Delay (seconds): 31-60 seconds      Stem cell collection?: No                     Labor Events and Shoulder Dystocia    Fetal Tracing  Prior to Delivery: Category 2  Shoulder dystocia present?: Neg     Delivery (Maternal) (Provider to Complete) ([id number])    Episiotomy: None  Perineal lacerations: 2nd Repaired?: Yes   Repair suture: 3-0 Vicryl  Number of repair packets: 1     Blood Loss  Mother: Lindsey Giles #[id number]   Start of Mother's Information    Delivery Blood Loss  23 0908 - 23 2132    None           End of Mother's Information  Mother: Corwin Gilespeyton CORTES #[id number]          Delivery - Provider to Complete ([id number])    Delivery Type (Choose the 1 that will go to the Birth History): , Spontaneous                                 Placenta    Removal: Expressed  Disposition: Hospital disposal           Presentation and Position    Presentation: Vertex    Position: Left Occiput Anterior                 Liz Carbajal MD

## 2023-05-31 NOTE — PROGRESS NOTES
[facility] LABOR [facility] DELIVERY PROGRESS NOTE:   May 30, 2023 6:30PM         SUBJECTIVE:   Comfortable with epidural.  Ok with AROM if appropriate.           OBJECTIVE:     Vitals:    23 1750 23 1753 23 1755 23 1800   BP: 131/66 121/62 131/74 122/70   BP Location:       Patient Position:       Cuff Size:       Temp:       TempSrc:       SpO2: 98%  98% 98%         NST:  reactive  FHT:  155/mod/+ accels, no decels  Cat:   1  [place]:  q 3-5 min  SVE:  6/90/-1, s/p AROM.  No fluid return, but definite ROM             ASSESSMENT / PLAN:   33 year old  at 41w1d admitted for post-dates IOL.    Complications:  Previous :  S/p TOLAC consult.  Discussed risks of uterine rupture, which is slightly increased when using pitocin, but this is still considered a reasonable and safe means of IOL in the setting of TOLAC.    Labor: s/p AROM.  Cont pitocin, titrating as appropriate.    Fetal well being: Category 1 tracing.  GBS: neg  Pain: desires epidural as appropriate.    Liz Carbajal MD    ------------  Addendum at 7:09 PM  Variable decels following AROM, but recovered to normal baseline.    Liz Carbajal MD    ---------------  Addendum at 7:28 PM  Decelerations continued.  Patient did feel leaking of large amount of fluid after AROM.  SCE 7.5/90/0.  Pitocin decreased by half and FHT improved with repositioning to right side.      Patient and  aware that if persistent concerns re FHT, would recommend repeat .    Liz Carbajal MD

## 2023-06-01 VITALS
DIASTOLIC BLOOD PRESSURE: 79 MMHG | TEMPERATURE: 97.6 F | SYSTOLIC BLOOD PRESSURE: 118 MMHG | RESPIRATION RATE: 17 BRPM | HEART RATE: 62 BPM | OXYGEN SATURATION: 97 %

## 2023-06-01 PROCEDURE — 250N000013 HC RX MED GY IP 250 OP 250 PS 637

## 2023-06-01 RX ADMIN — IBUPROFEN 800 MG: 800 TABLET, FILM COATED ORAL at 10:39

## 2023-06-01 RX ADMIN — IBUPROFEN 800 MG: 800 TABLET, FILM COATED ORAL at 04:31

## 2023-06-01 RX ADMIN — ACETAMINOPHEN 650 MG: 325 TABLET, FILM COATED ORAL at 01:33

## 2023-06-01 RX ADMIN — DOCUSATE SODIUM 100 MG: 100 CAPSULE, LIQUID FILLED ORAL at 07:28

## 2023-06-01 RX ADMIN — ACETAMINOPHEN 650 MG: 325 TABLET, FILM COATED ORAL at 07:28

## 2023-06-01 ASSESSMENT — ACTIVITIES OF DAILY LIVING (ADL)
ADLS_ACUITY_SCORE: 18

## 2023-06-01 NOTE — PROVIDER NOTIFICATION
06/01/23 1027   Provider Notification   Provider Name/Title G2 A. Scmiesing   Method of Notification Electronic Page   Request Evaluate-Remote   Notification Reason Status Update     Pt just passed golf ball-sized clot. Lochis now scant. Fundus U/1, firm and midline.

## 2023-06-01 NOTE — PLAN OF CARE
Patient's postpartum assessment WDL, vital signs stable. Fundus firm and midline, lochia scant. Passed one golfball-sized clot this morning, G2 notified.   Experiencing perineal pain and uterine cramping, taking tylenol and ibuprofen. Also using ice and tucks.   Voiding without difficulty and is passing gas. No BM yet since delivery, colace given  Bonding well with infant.   Breastfeeds infant independently on cue, latch-on verified.   Discharging to home with spouse and infant. Discharge education done, AVS signed. Has breast pump at home. Birth certificate completed and handed in, no ROP needed. Videos watched. EDS completed and patient scored 6 (0 on last question about self harm).

## 2023-06-01 NOTE — PLAN OF CARE
Goal Outcome Evaluation:     Vital signs and postpartum assessments within normal limits. Fundus is midline, firm, and at umbilicus. Lochia is scant. Pain adequately managed with tylenol and ibuprofen.  Up ad sandra voiding without difficulty. Breastfeeding on cue every 2-3 hours with good latch. Bonding well with . Continue with plan of care.

## 2023-06-01 NOTE — PROGRESS NOTES
[facility]   Post-partum Note    Name:  Lindsey Giles  MRN: [id number]    S: Patient is feeling well overall, just tired.  Pain somewhat controlled.  Tolerating regular diet without nausea or vomiting.  Ambulating without dizziness.  Lochia appropriate.  Working on breast feeding. Planning to go home today.    O:   Patient Vitals for the past 24 hrs:   BP Temp Temp src Pulse Resp   23 0721 118/79 97.6  F (36.4  C) Oral 62 17   23 0434 115/76 98  F (36.7  C) Oral 65 14   23 2012 130/88 98  F (36.7  C) Oral 62 16   23 1614 122/84 98.1  F (36.7  C) Oral 68 17   23 1226 121/77 97  F (36.1  C) Axillary 74 16   23 0814 121/84 97.9  F (36.6  C) Oral 63 16     Gen:  Resting comfortably, NAD  CV:  RR, well perfused  Pulm:  Breathing comfortably on room air  Abd:  Soft, appropriately ttp, non-distended. Fundus below umbilicus, firm and non-tender.  Ext:  non-tender, trace LE edema b/l    No intake/output data recorded.    Hgb:   Hemoglobin   Date Value Ref Range Status   2023 11.0 (L) 11.7 - 15.7 g/dL Final   2019 13.4 11.7 - 15.7 g/dL Final       Assessment/Plan:  Lindsey Giles 33 year old  on PPD#2 s/p  yesterday evening. Doing well in early postpartum period. Meeting goals for discharge to home.    #Postpartum management  Pain: Well-controlled with ibuprofen and tylenol.  Hgb: 12.5> > 11.0.  Rh: Positive  Rubella: Immune  GI: PRN bowel regimen ordered  Feed: Breast  BC: Follow up at 6wk PP visit.    # gTCP  - Plt 135> 126, stable    # H/o gHTN  # MR BP <4H  - BPs predominantly normotensive since delivery  - HELLP labs PRN      Dispo: Anticipate DC later this morning.    Geovani Paz MD PGY2  Obstetrics & Gynecology  23 7:54 AM     Patient seen and examined by me, agree with above resident note. Overall doing well and meeting all goals, stable for discharge.  Instructions given.  Planning IUD, will make pp visit in 8  heidy BRYAN MD

## 2023-06-01 NOTE — PROVIDER NOTIFICATION
06/01/23 0930   Provider Notification   Provider Name/Title SANTHOSH Esquivel   Method of Notification Electronic Page   Request Evaluate-Remote   Notification Reason Other   Pt is wondering if she should continue taking B12 vitamin at home?  Response: Yes, she can keep taking it.

## 2023-06-01 NOTE — DISCHARGE INSTRUCTIONS

## 2023-06-29 ENCOUNTER — MEDICAL CORRESPONDENCE (OUTPATIENT)
Dept: HEALTH INFORMATION MANAGEMENT | Facility: CLINIC | Age: 33
End: 2023-06-29
Payer: COMMERCIAL

## 2023-07-11 ENCOUNTER — PRENATAL OFFICE VISIT (OUTPATIENT)
Dept: OBGYN | Facility: CLINIC | Age: 33
End: 2023-07-11
Payer: COMMERCIAL

## 2023-07-11 VITALS
BODY MASS INDEX: 23.89 KG/M2 | HEART RATE: 61 BPM | TEMPERATURE: 97.2 F | WEIGHT: 148 LBS | SYSTOLIC BLOOD PRESSURE: 119 MMHG | DIASTOLIC BLOOD PRESSURE: 84 MMHG

## 2023-07-11 DIAGNOSIS — N81.89 PELVIC FLOOR WEAKNESS: ICD-10-CM

## 2023-07-11 PROCEDURE — 99207 PR POST PARTUM EXAM: CPT | Performed by: OBSTETRICS & GYNECOLOGY

## 2023-07-11 ASSESSMENT — ANXIETY QUESTIONNAIRES
7. FEELING AFRAID AS IF SOMETHING AWFUL MIGHT HAPPEN: NOT AT ALL
IF YOU CHECKED OFF ANY PROBLEMS ON THIS QUESTIONNAIRE, HOW DIFFICULT HAVE THESE PROBLEMS MADE IT FOR YOU TO DO YOUR WORK, TAKE CARE OF THINGS AT HOME, OR GET ALONG WITH OTHER PEOPLE: SOMEWHAT DIFFICULT
1. FEELING NERVOUS, ANXIOUS, OR ON EDGE: NOT AT ALL
2. NOT BEING ABLE TO STOP OR CONTROL WORRYING: NOT AT ALL
GAD7 TOTAL SCORE: 5
GAD7 TOTAL SCORE: 5
6. BECOMING EASILY ANNOYED OR IRRITABLE: SEVERAL DAYS
5. BEING SO RESTLESS THAT IT IS HARD TO SIT STILL: SEVERAL DAYS
3. WORRYING TOO MUCH ABOUT DIFFERENT THINGS: SEVERAL DAYS

## 2023-07-11 ASSESSMENT — PATIENT HEALTH QUESTIONNAIRE - PHQ9
5. POOR APPETITE OR OVEREATING: MORE THAN HALF THE DAYS
SUM OF ALL RESPONSES TO PHQ QUESTIONS 1-9: 5

## 2023-07-11 NOTE — PROGRESS NOTES
[facility]- OBGYN    CC: routine postpartum visit    S:Lindsey Giles is a 33 year old  s/p  on 23 who presents today for routine postpartum visit.  Patient reports overall feeling well.  Pain is resolved.  No further vaginal bleeding.  No issues with bowel or bladder function.  Does feel some pelvic floor weakness.  Baby feeding well and growing appropriately,  She feels sleep is going ok.  She feels mood is good.  Has not resumed intercourse.  Plans Kyleena IUD for birth control to be inserted in a couple of weeks.  She has not resumed intercourse.         O: Patient Vitals for the past 24 hrs:   BP Temp Pulse Weight   23 1041 119/84 97.2  F (36.2  C) 61 67.1 kg (148 lb)   ]  Exam:  General- awake, alert, answering questions appropriately, appears comfortable  CV- regular rate  Lung- breathing comfortably on room air  - normal appearing external genitalia, well healed second degree perineal laceration site  Ext- bilateral lower extremities with no edema      A&P:Lindsey Giles is a 33 year old  s/p  on 23 who presents today for routine postpartum visit.    (Z39.2) Routine postpartum follow-up  (primary encounter diagnosis)  Comment: Patient overall doing well.  Healed from delivery.  Discussed no further activity restrictions.  Reviewed resumption of normal exercise.  Patient plans Kyleena IUD for birth control.  Discussed option of Kyleena vs. Mirena and the patient has used Kyleena in the past and was happy with it, so would like to proceed with placement of Kyleena.  Recommend use of condoms 100% of the time or abstinence between now and IUD placement.    Plan: next visit scheduled 23 for IUD insertion with Dr. Paz.  Patient due for pap in October.  Plan for PAP smear during IUD insertion    (N81.89) Pelvic floor weakness  Comment: Patient interested in possible pelvic floor PT for overall pelvic floor muscle optimization.  She is a runner.  No  incontinence now, but has not tried running since delivery.  Plan: Physical Therapy Referral    Inés Edward MD

## 2023-07-31 ENCOUNTER — MEDICAL CORRESPONDENCE (OUTPATIENT)
Dept: HEALTH INFORMATION MANAGEMENT | Facility: CLINIC | Age: 33
End: 2023-07-31
Payer: COMMERCIAL

## 2023-08-08 ENCOUNTER — OFFICE VISIT (OUTPATIENT)
Dept: OBGYN | Facility: CLINIC | Age: 33
End: 2023-08-08
Payer: COMMERCIAL

## 2023-08-08 VITALS
WEIGHT: 146.7 LBS | SYSTOLIC BLOOD PRESSURE: 113 MMHG | HEART RATE: 62 BPM | BODY MASS INDEX: 23.68 KG/M2 | DIASTOLIC BLOOD PRESSURE: 69 MMHG | OXYGEN SATURATION: 98 %

## 2023-08-08 DIAGNOSIS — Z30.430 ENCOUNTER FOR IUD INSERTION: ICD-10-CM

## 2023-08-08 DIAGNOSIS — Z30.430 ENCOUNTER FOR INSERTION OF INTRAUTERINE CONTRACEPTIVE DEVICE: ICD-10-CM

## 2023-08-08 DIAGNOSIS — Z12.4 SCREENING FOR MALIGNANT NEOPLASM OF CERVIX: ICD-10-CM

## 2023-08-08 LAB — HCG UR QL: NEGATIVE

## 2023-08-08 PROCEDURE — 81025 URINE PREGNANCY TEST: CPT | Performed by: OBSTETRICS & GYNECOLOGY

## 2023-08-08 PROCEDURE — 87624 HPV HI-RISK TYP POOLED RSLT: CPT | Performed by: OBSTETRICS & GYNECOLOGY

## 2023-08-08 PROCEDURE — 58300 INSERT INTRAUTERINE DEVICE: CPT | Performed by: OBSTETRICS & GYNECOLOGY

## 2023-08-08 PROCEDURE — G0145 SCR C/V CYTO,THINLAYER,RESCR: HCPCS | Performed by: OBSTETRICS & GYNECOLOGY

## 2023-08-08 NOTE — PROGRESS NOTES
Lindsey is here for a 6-week postpartum checkup.    She had a  of a viable boy, weight 9 pounds 1 oz., with no complications.  Since delivery, she has been breast feeding.  She has no signs of infection, bleeding or other complications.  She is not pregnant.  We discussed contraception and she has chosen Mirena IUD.    Mood is good.  Today's Depression Rating was       2023    11:06 AM   PHQ-9 SCORE   PHQ-9 Total Score 5       EXAM:  Vitals:    23 0956   BP: 113/69   BP Location: Left arm   Patient Position: Sitting   Cuff Size: Adult Regular   Pulse: 62   SpO2: 98%   Weight: 66.5 kg (146 lb 11.2 oz)     HEENT: grossly normal.  NECK: no lymphadenopathy or thyroidomegaly.  LUNGS: CTA X 2, no rales or crackles.  BREASTS: symmetric, no masses or discharge  BACK: No spinal or CVA tenderness.  HEART: RRR without murmurs clicks or gallops.  ABDOMEN: soft, non tender, good bowel sounds, without masses rebound, guarding or tenderness.      PELVIC:    External genitalia: normal without lesion, repair well healed.                            Vagina: normal mucosa and rugae, no discharge.  Cervix: multiparous, well healed, without lesion.  Uterus: non pregnant in size, firm , mobile, no lesions.  Adnexa: non tender, without masses    EXTREMITIES: Warm to touch, good pulses, no ankle edema or calf tenderness.  NEUROLOGIC: grossly normal.    ASSESSMENT:   Normal 6-week postpartum exam after .    PLAN:  Pap smear Done and Mirena IUD for contraception.  IUD Insertion:  CONSULT:    Is a pregnancy test required: Yes.  Was it positive or negative?  Negative  Was a consent obtained?  Yes    Subjective: Lindsey Giles is a 33 year old  presents for IUD and desires Mirena type IUD.    Patient has been given the opportunity to ask questions about all forms of birth control, including all options appropriate for Lindsey Giles. Discussed that no method of birth control, except abstinence is 100% effective against  pregnancy or sexually transmitted infection.     Lindsey Giles understands she may have the IUD removed at any time. IUD should be removed by a health care provider.    The entire insertion procedure was reviewed with the patient, including care after placement.    Patient's last menstrual period was 08/15/2022. . No allergy to betadine or shellfish. Patient declines STD screening  HCG Qual Urine   Date Value Ref Range Status   07/17/2019 Negative neg Final     hCG Urine Qualitative   Date Value Ref Range Status   08/08/2023 Negative Negative Final     Comment:     This test is for screening purposes.  Results should be interpreted along with the clinical picture.  Confirmation testing is available if warranted by ordering [id number], HCG Quantitative Pregnancy.         /69 (BP Location: Left arm, Patient Position: Sitting, Cuff Size: Adult Regular)   Pulse 62   Wt 66.5 kg (146 lb 11.2 oz)   LMP 08/15/2022   SpO2 98%   Breastfeeding Yes   BMI 23.68 kg/m      Pelvic Exam:   EG/BUS: normal genital architecture without lesions, erythema or abnormal secretions.   Vagina: moist, pink, rugae with physiologic discharge and secretions  Cervix: parous no lesions and pink, moist, closed, without lesion or CMT  Uterus: retroverted position, mobile, no pain  Adnexa: within normal limits and no masses, nodularity, tenderness    PROCEDURE NOTE: -- IUD Insertion    Reason for Insertion: contraception    Premedicated with ibuprofen.  Under sterile technique, cervix was visualized with speculum and prepped with Betadine solution swab x 3. Tenaculum was placed for stability. The uterus was gently straightened and sounded to 7.0 cm. IUD prepared for placement, and IUD inserted according to 's instructions without difficulty or significant resitance, and deployed at the fundus. The strings were visualized and trimmed to 3.0 cm from the external os. Tenaculum was removed and hemostasis noted. Speculum removed.   Patient tolerated procedure well.    Lot # see MAR  Exp: see MAR    EBL: minimal    Complications: none    ASSESSMENT:     ICD-10-CM    1. Routine postpartum follow-up  Z39.2       2. Encounter for IUD insertion  Z30.430 HCG Qual, Urine ([id number])     levonorgestrel (MIRENA) 52 MG (20 mcg/day) IUD 1 each     INSERTION INTRAUTERINE DEVICE      3. Screening for malignant neoplasm of cervix  Z12.4 Pap screen with HPV - recommended age 30 - 65 years      4. Encounter for insertion of intrauterine contraceptive device  Z30.430              PLAN:    Given 's handouts, including when to have IUD removed, list of danger s/sx, side effects and follow up recommended. Encouraged condom use for prevention of STD. Back up contraception advised for 7 days if progestin method. Advised to call for any fever, for prolonged or severe pain or bleeding, abnormal vaginal discharge, or unable to palpate strings. She was advised to use pain medications (ibuprofen) as needed for mild to moderate pain. Advised to follow-up in clinic in 4-6 weeks for IUD string check if unable to find strings or as directed by provider.     Iraida Paz MD

## 2023-08-08 NOTE — PATIENT INSTRUCTIONS
Called patient and left msg to find out if she is in need of the Dexcom reader or if she is going to use the G6 rakesh.    Sent patient a msg. Will await her reply before sending the Dexcom order to pharmacy.        IUD information:     Benefits: The IUD can be 97-99% effective when carefully following directions regarding use. It can be more effective if used with additional contraception. IUD containing progestin may decrease menstrual flow and menstrual cramping.     Risks/Side Effects: include but are not limited to spotting, bleeding, hemorrhage, or anemia: cramping or pain: partial or complete expulsion of device; lost IUD strings; uterine or cervical perforation; embedding of IUD in the uterine wall; increased risk of pelvic inflammatory disease. Women who become pregnant with an IUD in place are at a higher risk for ectopic pregnancy should a pregnancy occur with an IUD in situ. There is a higher rate of miscarriage when pregnancy occurs with IUD in place.    Warning signs: Please call clinic if you have abnormal spotting or heavy bleeding, abdominal pain, dyspareunia, fever, chills, flu like symptoms, or unable to locate strings of IUD, or strings are longer or shorter than expected.    You are encouraged to use condoms for prevention of STD. You may also need back up contraception for 7 days with your IUD. You may use pain medications (ibuprofen) as needed for mild to moderate pain. Please follow-up in clinic in 4-6 weeks for IUD string check if unable to find strings or as directed by provider.

## 2023-08-10 LAB
BKR LAB AP GYN ADEQUACY: NORMAL
BKR LAB AP GYN INTERPRETATION: NORMAL
BKR LAB AP HPV REFLEX: NORMAL
BKR LAB AP PREVIOUS ABNORMAL: NORMAL
PATH REPORT.COMMENTS IMP SPEC: NORMAL
PATH REPORT.COMMENTS IMP SPEC: NORMAL
PATH REPORT.RELEVANT HX SPEC: NORMAL

## 2023-08-11 LAB
HUMAN PAPILLOMA VIRUS 16 DNA: NEGATIVE
HUMAN PAPILLOMA VIRUS 18 DNA: NEGATIVE
HUMAN PAPILLOMA VIRUS FINAL DIAGNOSIS: NORMAL
HUMAN PAPILLOMA VIRUS OTHER HR: NEGATIVE

## 2023-09-15 ENCOUNTER — THERAPY VISIT (OUTPATIENT)
Dept: PHYSICAL THERAPY | Facility: CLINIC | Age: 33
End: 2023-09-15
Attending: OBSTETRICS & GYNECOLOGY
Payer: COMMERCIAL

## 2023-09-15 DIAGNOSIS — N81.89 PELVIC FLOOR WEAKNESS: ICD-10-CM

## 2023-09-15 PROCEDURE — 97535 SELF CARE MNGMENT TRAINING: CPT | Mod: GP

## 2023-09-15 PROCEDURE — 97161 PT EVAL LOW COMPLEX 20 MIN: CPT | Mod: GP

## 2023-09-15 PROCEDURE — 97110 THERAPEUTIC EXERCISES: CPT | Mod: GP

## 2023-09-15 PROCEDURE — 97530 THERAPEUTIC ACTIVITIES: CPT | Mod: GP

## 2023-09-15 NOTE — PROGRESS NOTES
PHYSICAL THERAPY EVALUATION  Type of Visit: Evaluation    See electronic medical record for Abuse and Falls Screening details.    Subjective       Presenting condition or subjective complaint:    Date of onset: 05/30/23    Relevant medical history:     Dates & types of surgery:      Gave birth of her third baby on 5/30/23. Prior c section on 5/15/19. Pt likes to micah. Pt hasn't had any bladder leaking issues since pregnancy. No urgency. No problems with bowel movements. Pt reports she feels weak in her pelvic region. 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   Hobbies/Interests:  busy with the kids, painting, running    Patient goals for therapy:  make sure theres no problems or leading to more problems down the road, getting back to activity, core strengthening     Pain assessment:  none     Objective      PELVIC EVALUATION  ADDITIONAL HISTORY:  Sex assigned at birth:  female   Gender identity:    female  Pronouns:    she/her/hers    Bladder History:  Feels bladder filling:  yes  Triggers for feeling of inability to wait to go to the bathroom:    no  How long can you wait to urinate:  several hours  Gets up at night to urinate:    1x  Can stop the flow of urine when urinating:  no  Volume of urine usually released:   medium/average  Other issues:    Number of bladder infections in last 12 months:    Fluid intake per day:      50-60oz water, 12-20oz caffeine  Medications taken for bladder:     no  Activities causing urine leak:       Amount of urine typically leaked:    Pads used to help with leaking:          Bowel History:  Frequency of bowel movement:  1/few days   Consistency of stool:    soft formed  Ignores the urge to defecate:  no  Other bowel issues:    Length of time spent trying to have a bowel movement:      Sexual Function History:  Sexual orientation:    straight  Sexually active:  yes  Lubrication used:    water based  Pelvic pain:      Pain or difficulty with orgasms/erection/ejaculation:    no  State of menopause:  perimenopause  Hormone medications:    no         Discussed reason for referral regarding pelvic health needs and external/internal pelvic floor muscle examination with patient/guardian.  Opportunity provided to ask questions and verbal consent for assessment and intervention was given.      LUMBAR SCREEN:  generally decreased mobility without pain  HIP SCREEN:  Strength:   Pain: - none + mild ++ moderate +++ severe  Strength Scale: 0-5/5 Left Right   Hip Flexion 5 5   Hip Abduction 4 4   Hip Internal Rotation 4 5   Hip External Rotation 5 5   Knee Flexion 5 5   Knee Extension 5 5      Functional Strength Testing: Double Leg Squat: Anterior knee translation, Knee valgus, Hip internal rotation, and Improper use of glutes/hips  Hip ROM WFL      PELVIC EXAM  External Visual Inspection:  At rest: Normal  With voluntary pelvic floor contraction: Perineal elevation  With intra-abdominal pressure: Cough: very minor descent  Bearing down as defecation: Perineal descent    Integumentary:   Introitus: Unremarkable    Scar:   Location/Type: c section  Mobility: Hypomobile      ABDOMINAL ASSESSMENT  Diastasis Rectus Abdominis (JENI):  JENI presence: Yes   Above Umbilicus Depth/Finger width: 1   At Umbilicus Depth/Finger width: 2-2.5   Below Umbilicus Depth/Finger width: 1    Abdominal Activation/Strength: SLR:      Scar:   Location/Type: c section  Mobility:  some adhesions below scar - puckering with lifting  scar        Assessment & Plan   CLINICAL IMPRESSIONS  Medical Diagnosis: pelvic floor weakness    Treatment Diagnosis: pelvic floor dysfunction and diastasis recti   Impression/Assessment: Patient is a 33 year old female with pelvic floor weakness, diastasiss recti and general deconditioning complaints.  The following significant findings have been identified: Decreased ROM/flexibility, Decreased strength, and Decreased activity tolerance. These impairments interfere with their ability to perform self care tasks and recreational activities as compared to previous level of function.     Clinical Decision Making (Complexity):  Clinical Presentation: Stable/Uncomplicated  Clinical Presentation Rationale: based on medical and personal factors listed in PT evaluation  Clinical Decision Making (Complexity): Low complexity    PLAN OF CARE  Treatment Interventions:  Modalities: Biofeedback  Interventions: Manual Therapy, Neuromuscular Re-education, Therapeutic Activity, Therapeutic Exercise, Self-Care/Home Management    Long Term Goals     PT Goal 1  Goal Identifier: diastasis recti  Goal Description: pt will demonstrate proper pressure management to have no doming with functional activities  Rationale: to maximize safety and independence with performance of ADLs and functional tasks  Target Date: 11/10/23  PT Goal 2  Goal Identifier: strength  Goal Description: pt will improve hip strength to 5/5 to improve ease of ADLs and return to all recreational activities  Rationale: to maximize safety and independence with performance of ADLs and functional tasks  Target Date: 11/10/23      Frequency of Treatment: 1x every 2 weeks for 8 weeks  Duration of Treatment: 8 weeks    Recommended Referrals to Other Professionals: Physical Therapy  Education Assessment:   Learner/Method: Patient    Risks and benefits of evaluation/treatment have been explained.   Patient/Family/caregiver agrees with Plan of Care.     Evaluation Time:     PT  Eval, Low Complexity Minutes (77915): 25       Signing Clinician: Isha Mabry PT

## 2023-09-25 ENCOUNTER — THERAPY VISIT (OUTPATIENT)
Dept: PHYSICAL THERAPY | Facility: CLINIC | Age: 33
End: 2023-09-25
Payer: COMMERCIAL

## 2023-09-25 DIAGNOSIS — N81.89 PELVIC FLOOR WEAKNESS: Primary | ICD-10-CM

## 2023-09-25 PROCEDURE — 97110 THERAPEUTIC EXERCISES: CPT | Mod: GP

## 2023-09-25 PROCEDURE — 97140 MANUAL THERAPY 1/> REGIONS: CPT | Mod: GP

## 2023-10-16 ENCOUNTER — MEDICAL CORRESPONDENCE (OUTPATIENT)
Dept: PHYSICAL THERAPY | Facility: CLINIC | Age: 33
End: 2023-10-16

## 2023-11-03 ENCOUNTER — THERAPY VISIT (OUTPATIENT)
Dept: PHYSICAL THERAPY | Facility: CLINIC | Age: 33
End: 2023-11-03
Payer: COMMERCIAL

## 2023-11-03 DIAGNOSIS — N81.89 PELVIC FLOOR WEAKNESS: Primary | ICD-10-CM

## 2023-11-03 PROCEDURE — 97112 NEUROMUSCULAR REEDUCATION: CPT | Mod: GP

## 2023-11-03 PROCEDURE — 97110 THERAPEUTIC EXERCISES: CPT | Mod: GP

## 2023-12-01 ENCOUNTER — MEDICAL CORRESPONDENCE (OUTPATIENT)
Dept: HEALTH INFORMATION MANAGEMENT | Facility: CLINIC | Age: 33
End: 2023-12-01
Payer: COMMERCIAL

## 2023-12-29 PROBLEM — N81.89 PELVIC FLOOR WEAKNESS: Status: RESOLVED | Noted: 2023-09-15 | Resolved: 2023-12-29

## 2023-12-29 NOTE — PROGRESS NOTES
11/03/23 0500   Appointment Info   Signing clinician's name / credentials Isha Mabry, PT, DPT   Total/Authorized Visits E+T   Visits Used 3   Medical Diagnosis pelvic floor weakness   PT Tx Diagnosis pelvic floor dysfunction and diastasis recti   Progress Note/Certification   Onset of illness/injury or Date of Surgery 05/30/23   Therapy Frequency 1x every 2 weeks for 8 weeks   Predicted Duration 8 weeks   Progress Note Due Date 11/10/23   Progress Note Completed Date 09/15/23   PT Goal 1   Goal Identifier diastasis recti   Goal Description pt will demonstrate proper pressure management to have no doming with functional activities   Rationale to maximize safety and independence with performance of ADLs and functional tasks   Goal Progress no doming, pt improving exercise tolerance   Target Date 11/10/23   PT Goal 2   Goal Identifier strength   Goal Description pt will improve hip strength to 5/5 to improve ease of ADLs and return to all recreational activities   Rationale to maximize safety and independence with performance of ADLs and functional tasks   Goal Progress consistently performing exercises and improving activity tolerance, pt reports still feeling weaker in L LE   Target Date 11/10/23   Subjective Report   Subjective Report Pt reports she is doing well. Pt reports she has been more active going for more runs. She ran 4.5 miles last week and felt good as well as doing yoga and feeling good. Pt still notices her L leg is weaker but pain is improving in the knee. Pt reported when she was doing more with the scar she was feeling more numbness.   Treatment Interventions (PT)   Interventions Neuromuscular Re-education   Therapeutic Procedure/Exercise   Therapeutic Procedures: strength, endurance, ROM, flexibillity minutes (20560) 25   PTRx Ther Proc 1 Prone Plank Modified Knees   PTRx Ther Proc 1 - Details 3x10 sec hold   PTRx Ther Proc 2 Side Plank Modified Knees   PTRx Ther Proc 2 - Details 2x10 sec  hold   PTRx Ther Proc 3 Pallof Press   PTRx Ther Proc 3 - Details verbal review   PTRx Ther Proc 4 Split Squat   PTRx Ther Proc 4 - Details x10 each side   PTRx Ther Proc 5 Side Stepping With Theraband   PTRx Ther Proc 5 - Details verbal review   Skilled Intervention cueing for dosing and technique, education on which exercises are targeting which muscles and how each should be used at home, continue running as able   Patient Response/Progress pt demonstrated understanding   PTRx Ther Proc 6 Squat   PTRx Ther Proc 6 - Details verbal review   PTRx Ther Proc 7 Clamshell with Theraband   PTRx Ther Proc 7 - Details verbal review   Neuromuscular Re-education   Neuromuscular re-ed of mvmt, balance, coord, kinesthetic sense, posture, proprioception minutes (72818) 15   Neuromuscular Re-education Neuro Re-ed 3;Neuro Re-ed 4;Neuro Re-ed 5;Neuro Re-ed 2;Neuro Re-ed 6;Neuro Re-ed 7   Neuro Re-ed 1 Performed RUSI assessment of patient motor control with training provided if abnormal responses observed.  Provided with visual, tactile, and verbal cues and implented changes in cuing to HEP.   Neuro Re-ed 1 - Details Patient in supine position.  Placed adequete amount of US gel on probe.  For transperineal assessment US head protected by US probe cover and non-irritating lubricant used external to probe cover.  Used curvilinear soundhead with US indicator on left of display.   Skilled Intervention RUSI technique and assessment   Patient Response/Progress See above for detailed motor control response of muscle groups assessed.   Neuro Re-ed 3 Transverse Abdominus assessment:   Neuro Re-ed 3 - Details exposed abdomen from pubic symphysis to ribcage. Probe placed in transverse orientation on anterolateral abdomen midway between iliac crest and inferior rib cage with probe marker towards patient's right. Probe angled laterally until clear view of lateral abdominal muscles obtained: Internal Oblique, External Oblique, TrA. Adjusted  probe to view linea semilunaris and lateral corsetting of the TrA. Enlarged image by 50% and adjusted gain to observe the following: quiet breathing, spinal loading through ASLR, patient attempt at TrA isolation. Imaged both sides to complare motor control. working on marching with TA contraction   Education   Learner/Method Patient   Plan   Home program ptrx phone   Plan for next session progress exercises - full plank, bridge, RDL, continue with scar mobilization   Total Session Time   Timed Code Treatment Minutes 40   Total Treatment Time (sum of timed and untimed services) 40           DISCHARGE  Reason for Discharge: Patient has failed to schedule further appointments.    Equipment Issued: HEP    Discharge Plan: Patient to continue home program.    Referring Provider:  Inés Edward

## 2024-07-13 ENCOUNTER — HEALTH MAINTENANCE LETTER (OUTPATIENT)
Age: 34
End: 2024-07-13

## 2024-10-26 ENCOUNTER — IMMUNIZATION (OUTPATIENT)
Dept: PEDIATRICS | Facility: CLINIC | Age: 34
End: 2024-10-26
Payer: COMMERCIAL

## 2024-10-26 PROCEDURE — 90656 IIV3 VACC NO PRSV 0.5 ML IM: CPT

## 2024-10-26 PROCEDURE — 90471 IMMUNIZATION ADMIN: CPT

## 2024-10-26 PROCEDURE — 91320 SARSCV2 VAC 30MCG TRS-SUC IM: CPT

## 2024-10-26 PROCEDURE — 90480 ADMN SARSCOV2 VAC 1/ONLY CMP: CPT

## 2024-11-27 ENCOUNTER — OFFICE VISIT (OUTPATIENT)
Dept: FAMILY MEDICINE | Facility: CLINIC | Age: 34
End: 2024-11-27
Payer: COMMERCIAL

## 2024-11-27 VITALS
HEART RATE: 70 BPM | TEMPERATURE: 97 F | SYSTOLIC BLOOD PRESSURE: 104 MMHG | BODY MASS INDEX: 21.75 KG/M2 | DIASTOLIC BLOOD PRESSURE: 72 MMHG | OXYGEN SATURATION: 99 % | HEIGHT: 67 IN | WEIGHT: 138.6 LBS | RESPIRATION RATE: 15 BRPM

## 2024-11-27 DIAGNOSIS — Z83.49 FAMILY HISTORY OF THYROID DISEASE: ICD-10-CM

## 2024-11-27 DIAGNOSIS — Z87.448 HISTORY OF PROTEINURIA SYNDROME: ICD-10-CM

## 2024-11-27 DIAGNOSIS — Z00.00 ROUTINE GENERAL MEDICAL EXAMINATION AT A HEALTH CARE FACILITY: Primary | ICD-10-CM

## 2024-11-27 PROBLEM — Z23 NEED FOR TDAP VACCINATION: Status: RESOLVED | Noted: 2022-09-29 | Resolved: 2024-11-27

## 2024-11-27 LAB
ANION GAP SERPL CALCULATED.3IONS-SCNC: 9 MMOL/L (ref 7–15)
BUN SERPL-MCNC: 11.1 MG/DL (ref 6–20)
CALCIUM SERPL-MCNC: 9.1 MG/DL (ref 8.8–10.4)
CHLORIDE SERPL-SCNC: 105 MMOL/L (ref 98–107)
CHOLEST SERPL-MCNC: 183 MG/DL
CREAT SERPL-MCNC: 0.59 MG/DL (ref 0.51–0.95)
CREAT UR-MCNC: 81.6 MG/DL
EGFRCR SERPLBLD CKD-EPI 2021: >90 ML/MIN/1.73M2
ERYTHROCYTE [DISTWIDTH] IN BLOOD BY AUTOMATED COUNT: 11.9 % (ref 10–15)
FASTING STATUS PATIENT QL REPORTED: YES
FASTING STATUS PATIENT QL REPORTED: YES
GLUCOSE SERPL-MCNC: 95 MG/DL (ref 70–99)
HCO3 SERPL-SCNC: 25 MMOL/L (ref 22–29)
HCT VFR BLD AUTO: 41.4 % (ref 35–47)
HDLC SERPL-MCNC: 55 MG/DL
HGB BLD-MCNC: 13.3 G/DL (ref 11.7–15.7)
LDLC SERPL CALC-MCNC: 117 MG/DL
MCH RBC QN AUTO: 29.6 PG (ref 26.5–33)
MCHC RBC AUTO-ENTMCNC: 32.1 G/DL (ref 31.5–36.5)
MCV RBC AUTO: 92 FL (ref 78–100)
MICROALBUMIN UR-MCNC: <12 MG/L
MICROALBUMIN/CREAT UR: NORMAL MG/G{CREAT}
NONHDLC SERPL-MCNC: 128 MG/DL
PLATELET # BLD AUTO: 202 10E3/UL (ref 150–450)
POTASSIUM SERPL-SCNC: 4.6 MMOL/L (ref 3.4–5.3)
RBC # BLD AUTO: 4.5 10E6/UL (ref 3.8–5.2)
SODIUM SERPL-SCNC: 139 MMOL/L (ref 135–145)
TRIGL SERPL-MCNC: 53 MG/DL
TSH SERPL DL<=0.005 MIU/L-ACNC: 1.08 UIU/ML (ref 0.3–4.2)
VIT D+METAB SERPL-MCNC: 26 NG/ML (ref 20–50)
WBC # BLD AUTO: 6.3 10E3/UL (ref 4–11)

## 2024-11-27 SDOH — HEALTH STABILITY: PHYSICAL HEALTH: ON AVERAGE, HOW MANY DAYS PER WEEK DO YOU ENGAGE IN MODERATE TO STRENUOUS EXERCISE (LIKE A BRISK WALK)?: 4 DAYS

## 2024-11-27 ASSESSMENT — PAIN SCALES - GENERAL: PAINLEVEL_OUTOF10: NO PAIN (0)

## 2024-11-27 ASSESSMENT — SOCIAL DETERMINANTS OF HEALTH (SDOH): HOW OFTEN DO YOU GET TOGETHER WITH FRIENDS OR RELATIVES?: TWICE A WEEK

## 2024-11-27 NOTE — PATIENT INSTRUCTIONS
Patient Education   Preventive Care Advice   This is general advice given by our system to help you stay healthy. However, your care team may have specific advice just for you. Please talk to your care team about your preventive care needs.  Nutrition  Eat 5 or more servings of fruits and vegetables each day.  Try wheat bread, brown rice and whole grain pasta (instead of white bread, rice, and pasta).  Get enough calcium and vitamin D. Check the label on foods and aim for 100% of the RDA (recommended daily allowance).  Lifestyle  Exercise at least 150 minutes each week  (30 minutes a day, 5 days a week).  Do muscle strengthening activities 2 days a week. These help control your weight and prevent disease.  No smoking.  Wear sunscreen to prevent skin cancer.  Have a dental exam and cleaning every 6 months.  Yearly exams  See your health care team every year to talk about:  Any changes in your health.  Any medicines your care team has prescribed.  Preventive care, family planning, and ways to prevent chronic diseases.  Shots (vaccines)   HPV shots (up to age 26), if you've never had them before.  Hepatitis B shots (up to age 59), if you've never had them before.  COVID-19 shot: Get this shot when it's due.  Flu shot: Get a flu shot every year.  Tetanus shot: Get a tetanus shot every 10 years.  Pneumococcal, hepatitis A, and RSV shots: Ask your care team if you need these based on your risk.  Shingles shot (for age 50 and up)  General health tests  Diabetes screening:  Starting at age 35, Get screened for diabetes at least every 3 years.  If you are younger than age 35, ask your care team if you should be screened for diabetes.  Cholesterol test: At age 39, start having a cholesterol test every 5 years, or more often if advised.  Bone density scan (DEXA): At age 50, ask your care team if you should have this scan for osteoporosis (brittle bones).  Hepatitis C: Get tested at least once in your life.  STIs (sexually  transmitted infections)  Before age 24: Ask your care team if you should be screened for STIs.  After age 24: Get screened for STIs if you're at risk. You are at risk for STIs (including HIV) if:  You are sexually active with more than one person.  You don't use condoms every time.  You or a partner was diagnosed with a sexually transmitted infection.  If you are at risk for HIV, ask about PrEP medicine to prevent HIV.  Get tested for HIV at least once in your life, whether you are at risk for HIV or not.  Cancer screening tests  Cervical cancer screening: If you have a cervix, begin getting regular cervical cancer screening tests starting at age 21.  Breast cancer scan (mammogram): If you've ever had breasts, begin having regular mammograms starting at age 40. This is a scan to check for breast cancer.  Colon cancer screening: It is important to start screening for colon cancer at age 45.  Have a colonoscopy test every 10 years (or more often if you're at risk) Or, ask your provider about stool tests like a FIT test every year or Cologuard test every 3 years.  To learn more about your testing options, visit:   .  For help making a decision, visit:   https://bit.ly/zb43868.  Prostate cancer screening test: If you have a prostate, ask your care team if a prostate cancer screening test (PSA) at age 55 is right for you.  Lung cancer screening: If you are a current or former smoker ages 50 to 80, ask your care team if ongoing lung cancer screenings are right for you.  For informational purposes only. Not to replace the advice of your health care provider. Copyright   2023 [facility]. All rights reserved. Clinically reviewed by the [facility]. [employer] [id number] - REV 01/24.  Learning About Stress  What is stress?     Stress is your body's response to a hard situation. Your body can have a physical, emotional, or mental response. Stress is a fact of life for most people, and it  affects everyone differently. What causes stress for you may not be stressful for someone else.  A lot of things can cause stress. You may feel stress when you go on a job interview, take a test, or run a race. This kind of short-term stress is normal and even useful. It can help you if you need to work hard or react quickly. For example, stress can help you finish an important job on time.  Long-term stress is caused by ongoing stressful situations or events. Examples of long-term stress include long-term health problems, ongoing problems at work, or conflicts in your family. Long-term stress can harm your health.  How does stress affect your health?  When you are stressed, your body responds as though you are in danger. It makes hormones that speed up your heart, make you breathe faster, and give you a burst of energy. This is called the fight-or-flight stress response. If the stress is over quickly, your body goes back to normal and no harm is done.  But if stress happens too often or lasts too long, it can have bad effects. Long-term stress can make you more likely to get sick, and it can make symptoms of some diseases worse. If you tense up when you are stressed, you may develop neck, shoulder, or low back pain. Stress is linked to high blood pressure and heart disease.  Stress also harms your emotional health. It can make you núñez, tense, or depressed. Your relationships may suffer, and you may not do well at work or school.  What can you do to manage stress?  You can try these things to help manage stress:   Do something active. Exercise or activity can help reduce stress. Walking is a great way to get started. Even everyday activities such as housecleaning or yard work can help.  Try yoga or ilia chi. These techniques combine exercise and meditation. You may need some training at first to learn them.  Do something you enjoy. For example, listen to music or go to a movie. Practice your hobby or do volunteer  "work.  Meditate. This can help you relax, because you are not worrying about what happened before or what may happen in the future.  Do guided imagery. Imagine yourself in any setting that helps you feel calm. You can use online videos, books, or a teacher to guide you.  Do breathing exercises. For example:  From a standing position, bend forward from the waist with your knees slightly bent. Let your arms dangle close to the floor.  Breathe in slowly and deeply as you return to a standing position. Roll up slowly and lift your head last.  Hold your breath for just a few seconds in the standing position.  Breathe out slowly and bend forward from the waist.  Let your feelings out. Talk, laugh, cry, and express anger when you need to. Talking with supportive friends or family, a counselor, or a marvin leader about your feelings is a healthy way to relieve stress. Avoid discussing your feelings with people who make you feel worse.  Write. It may help to write about things that are bothering you. This helps you find out how much stress you feel and what is causing it. When you know this, you can find better ways to cope.  What can you do to prevent stress?  You might try some of these things to help prevent stress:  Manage your time. This helps you find time to do the things you want and need to do.  Get enough sleep. Your body recovers from the stresses of the day while you are sleeping.  Get support. Your family, friends, and community can make a difference in how you experience stress.  Limit your news feed. Avoid or limit time on social media or news that may make you feel stressed.  Do something active. Exercise or activity can help reduce stress. Walking is a great way to get started.  Where can you learn more?  Go to https://www.[employer].net/patiented  Enter N032 in the search box to learn more about \"Learning About Stress.\"  Current as of: October 24, 2023  Content Version: 14.2 2024 [employer]. "   Care instructions adapted under license by your healthcare professional. If you have questions about a medical condition or this instruction, always ask your healthcare professional. [employer] disclaims any warranty or liability for your use of this information.

## 2024-11-27 NOTE — PROGRESS NOTES
Preventive Care Visit  [facility]  Sissy Mcintosh PA-C, Physician Assistant - Medical  Nov 27, 2024      Assessment & Plan     Routine general medical examination at a health care facility  - REVIEW OF HEALTH MAINTENANCE PROTOCOL ORDERS  - Vitamin D Deficiency; Future  - CBC with platelets; Future  - Basic metabolic panel; Future  - Lipid panel reflex to direct LDL Non-fasting; Future  - TSH with free T4 reflex; Future  - Vitamin D Deficiency  - CBC with platelets  - Basic metabolic panel  - Lipid panel reflex to direct LDL Non-fasting  - TSH with free T4 reflex    History of proteinuria syndrome - wonders about proteinuria noted during past 2 pregnancies  - Albumin Random Urine Quantitative with Creat Ratio; Future  - Albumin Random Urine Quantitative with Creat Ratio    Family history of thyroid disease  - TSH with free T4 reflex; Future  - TSH with free T4 reflex    Counseling  Appropriate preventive services were addressed with this patient via screening, questionnaire, or discussion as appropriate for fall prevention, nutrition, physical activity, Tobacco-use cessation, social engagement, weight loss and cognition.  Checklist reviewing preventive services available has been given to the patient.  Reviewed patient's diet, addressing concerns and/or questions.   She is at risk for psychosocial distress and has been provided with information to reduce risk.         Skylar Portillo is a 34 year old, presenting for the following:  Physical        11/27/2024     8:10 AM   Additional Questions   Roomed by Kristy Portillo here today for M    Has 3 boys - youngest 18 months - 7 and 5 other two  Works part time as a therapist    No particular concerns    HPI  Health Care Directive  Patient does not have a Health Care Directive: Discussed advance care planning with patient; however, patient declined at this time.      11/27/2024   General Health   How would you rate your overall  physical health? Good   Feel stress (tense, anxious, or unable to sleep) To some extent      (!) STRESS CONCERN      11/27/2024   Nutrition   Three or more servings of calcium each day? Yes   Diet: Regular (no restrictions)   How many servings of fruit and vegetables per day? (!) 2-3   How many sweetened beverages each day? 0-1          11/27/2024   Exercise   Days per week of moderate/strenous exercise 4 days          11/27/2024   Social Factors   Frequency of gathering with friends or relatives Twice a week   Worry food won't last until get money to buy more No   Food not last or not have enough money for food? No   Do you have housing? (Housing is defined as stable permanent housing and does not include staying ouside in a car, in a tent, in an abandoned building, in an overnight shelter, or couch-surfing.) Yes   Are you worried about losing your housing? No   Lack of transportation? No   Unable to get utilities (heat,electricity)? No          11/27/2024   Dental   Dentist two times every year? Yes          11/27/2024   TB Screening   Were you born outside of the [place]? No      Today's PHQ-2 Score:       11/27/2024     8:05 AM   PHQ-2 ( 1999 [employer])   Q1: Little interest or pleasure in doing things 1    Q2: Feeling down, depressed or hopeless 0    PHQ-2 Score 1    Q1: Little interest or pleasure in doing things Several days   Q2: Feeling down, depressed or hopeless Not at all   PHQ-2 Score 1       Patient-reported         11/27/2024   Substance Use   Alcohol more than 3/day or more than 7/wk No   Do you use any other substances recreationally? No      Social History     Tobacco Use    Smoking status: Never    Smokeless tobacco: Never   Vaping Use    Vaping status: Never Used   Substance Use Topics    Alcohol use: Not Currently     Comment: 3-4 per week    Drug use: No          Mammogram Screening - Patient under 40 years of age: Routine Mammogram Screening not recommended.         11/27/2024   STI Screening   New  "sexual partner(s) since last STI/HIV test? No      History of abnormal Pap smear: No - age 30- 64 PAP with HPV every 5 years recommended        Latest Ref Rng & Units 8/8/2023     9:49 AM 10/29/2018     9:55 AM   PAP / HPV   PAP  Negative for Intraepithelial Lesion or Malignancy (NILM)     PAP (Historical)   NIL    HPV 16 DNA Negative Negative  Negative    HPV 18 DNA Negative Negative  Negative    Other HR HPV Negative Negative  Negative          11/27/2024   Contraception/Family Planning   Questions about contraception or family planning No           Reviewed and updated as needed this visit by Provider   Tobacco   Meds  Problems  Med Hx  Surg Hx  Fam Hx               Objective    Exam  /72 (BP Location: Right arm, Patient Position: Sitting, Cuff Size: Adult Regular)   Pulse 70   Temp 97  F (36.1  C) (Temporal)   Resp 15   Ht 1.689 m (5' 6.5\")   Wt 62.9 kg (138 lb 9.6 oz)   LMP 11/23/2024 (Exact Date)   SpO2 99%   BMI 22.04 kg/m     Estimated body mass index is 22.04 kg/m  as calculated from the following:    Height as of this encounter: 1.689 m (5' 6.5\").    Weight as of this encounter: 62.9 kg (138 lb 9.6 oz).    Physical Exam  GENERAL: alert and no distress  EYES: Eyes grossly normal to inspection, PERRL and conjunctivae and sclerae normal  HENT: ear canals and TM's normal, nose and mouth without ulcers or lesions  NECK: no adenopathy, no asymmetry, masses, or scars  RESP: lungs clear to auscultation - no rales, rhonchi or wheezes  CV: regular rate and rhythm, normal S1 S2, no S3 or S4, no murmur, click or rub, no peripheral edema  ABDOMEN: soft, nontender, no hepatosplenomegaly, no masses and bowel sounds normal  MS: no gross musculoskeletal defects noted, no edema  SKIN: no suspicious lesions or rashes  NEURO: Normal strength and tone, mentation intact and speech normal  PSYCH: mentation appears normal, affect normal/bright        Signed Electronically by: Sissy Mcintosh PA-C    "

## (undated) DEVICE — SU MONOCRYL 4-0 PS-2 18" UND Y496G

## (undated) DEVICE — GLOVE ESTEEM POWDER FREE SMT 6.5  2D72PT65

## (undated) DEVICE — SUCTION CANISTER MEDIVAC LINER 1500ML W/LID 65651-515

## (undated) DEVICE — PACK C-SECTION LF PL15OTA83B

## (undated) DEVICE — SU VICRYL 3-0 CTX 36" UND J980H

## (undated) DEVICE — GLOVE PROTEXIS BLUE W/NEU-THERA 6.5  2D73EB65

## (undated) DEVICE — CATH TRAY FOLEY 16FR SILICONE 907416

## (undated) DEVICE — SU VICRYL 0 CT-1 36" J346H

## (undated) DEVICE — BASIN SET MAJOR

## (undated) DEVICE — STOCKING SLEEVE COMPRESSION CALF LG

## (undated) DEVICE — ESU GROUND PAD UNIVERSAL W/O CORD

## (undated) DEVICE — PREP CHLORAPREP 26ML TINTED ORANGE  260815

## (undated) DEVICE — STRAP KNEE/BODY 31143004

## (undated) DEVICE — SOL NACL 0.9% IRRIG 1000ML BOTTLE 07138-09

## (undated) DEVICE — DRAPE SETUP C-SECTION INVISISHIELD 54X90" DYNJE5600

## (undated) DEVICE — SOL WATER IRRIG 1000ML BOTTLE 07139-09

## (undated) RX ORDER — OXYTOCIN/0.9 % SODIUM CHLORIDE 30/500 ML
PLASTIC BAG, INJECTION (ML) INTRAVENOUS
Status: DISPENSED
Start: 2019-05-15

## (undated) RX ORDER — MORPHINE SULFATE 1 MG/ML
INJECTION, SOLUTION EPIDURAL; INTRATHECAL; INTRAVENOUS
Status: DISPENSED
Start: 2019-05-15

## (undated) RX ORDER — ONDANSETRON 2 MG/ML
INJECTION INTRAMUSCULAR; INTRAVENOUS
Status: DISPENSED
Start: 2019-05-15

## (undated) RX ORDER — FENTANYL CITRATE 50 UG/ML
INJECTION, SOLUTION INTRAMUSCULAR; INTRAVENOUS
Status: DISPENSED
Start: 2019-05-15

## (undated) RX ORDER — DEXAMETHASONE SODIUM PHOSPHATE 4 MG/ML
INJECTION, SOLUTION INTRA-ARTICULAR; INTRALESIONAL; INTRAMUSCULAR; INTRAVENOUS; SOFT TISSUE
Status: DISPENSED
Start: 2019-05-15

## (undated) RX ORDER — BUPIVACAINE HYDROCHLORIDE 7.5 MG/ML
INJECTION, SOLUTION INTRASPINAL
Status: DISPENSED
Start: 2019-05-15